# Patient Record
Sex: FEMALE | Race: WHITE | Employment: OTHER | URBAN - METROPOLITAN AREA
[De-identification: names, ages, dates, MRNs, and addresses within clinical notes are randomized per-mention and may not be internally consistent; named-entity substitution may affect disease eponyms.]

---

## 2017-01-03 ENCOUNTER — PATIENT OUTREACH (OUTPATIENT)
Dept: CASE MANAGEMENT | Age: 82
End: 2017-01-03

## 2017-01-26 ENCOUNTER — APPOINTMENT (OUTPATIENT)
Dept: CT IMAGING | Age: 82
End: 2017-01-26
Attending: EMERGENCY MEDICINE
Payer: MEDICARE

## 2017-01-26 ENCOUNTER — HOSPITAL ENCOUNTER (EMERGENCY)
Age: 82
Discharge: HOME | End: 2017-01-27
Attending: EMERGENCY MEDICINE
Payer: MEDICARE

## 2017-01-26 DIAGNOSIS — R10.84 ABDOMINAL PAIN, GENERALIZED: Primary | ICD-10-CM

## 2017-01-26 DIAGNOSIS — R19.7 DIARRHEA, UNSPECIFIED TYPE: ICD-10-CM

## 2017-01-26 DIAGNOSIS — I10 ESSENTIAL HYPERTENSION: Chronic | ICD-10-CM

## 2017-01-26 LAB
ALBUMIN SERPL BCP-MCNC: 3.3 G/DL (ref 3.2–4.6)
ALBUMIN/GLOB SERPL: 0.8 {RATIO} (ref 1.2–3.5)
ALP SERPL-CCNC: 70 U/L (ref 50–136)
ALT SERPL-CCNC: 20 U/L (ref 12–65)
ANION GAP BLD CALC-SCNC: 5 MMOL/L (ref 7–16)
AST SERPL W P-5'-P-CCNC: 17 U/L (ref 15–37)
BASOPHILS # BLD AUTO: 0 K/UL (ref 0–0.2)
BASOPHILS # BLD: 1 % (ref 0–2)
BILIRUB SERPL-MCNC: 0.6 MG/DL (ref 0.2–1.1)
BUN SERPL-MCNC: 17 MG/DL (ref 8–23)
CALCIUM SERPL-MCNC: 9 MG/DL (ref 8.3–10.4)
CHLORIDE SERPL-SCNC: 103 MMOL/L (ref 98–107)
CO2 SERPL-SCNC: 30 MMOL/L (ref 21–32)
CREAT SERPL-MCNC: 0.83 MG/DL (ref 0.6–1)
DIFFERENTIAL METHOD BLD: ABNORMAL
EOSINOPHIL # BLD: 0.3 K/UL (ref 0–0.8)
EOSINOPHIL NFR BLD: 6 % (ref 0.5–7.8)
ERYTHROCYTE [DISTWIDTH] IN BLOOD BY AUTOMATED COUNT: 14 % (ref 11.9–14.6)
GLOBULIN SER CALC-MCNC: 4 G/DL (ref 2.3–3.5)
GLUCOSE SERPL-MCNC: 105 MG/DL (ref 65–100)
HCT VFR BLD AUTO: 37.3 % (ref 35.8–46.3)
HGB BLD-MCNC: 12.5 G/DL (ref 11.7–15.4)
IMM GRANULOCYTES # BLD: 0 K/UL (ref 0–0.5)
IMM GRANULOCYTES NFR BLD AUTO: 0.4 % (ref 0–5)
LIPASE SERPL-CCNC: 154 U/L (ref 73–393)
LYMPHOCYTES # BLD AUTO: 31 % (ref 13–44)
LYMPHOCYTES # BLD: 1.6 K/UL (ref 0.5–4.6)
MCH RBC QN AUTO: 34.6 PG (ref 26.1–32.9)
MCHC RBC AUTO-ENTMCNC: 33.5 G/DL (ref 31.4–35)
MCV RBC AUTO: 103.3 FL (ref 79.6–97.8)
MONOCYTES # BLD: 0.5 K/UL (ref 0.1–1.3)
MONOCYTES NFR BLD AUTO: 9 % (ref 4–12)
NEUTS SEG # BLD: 2.8 K/UL (ref 1.7–8.2)
NEUTS SEG NFR BLD AUTO: 53 % (ref 43–78)
PLATELET # BLD AUTO: 187 K/UL (ref 150–450)
PMV BLD AUTO: 12.3 FL (ref 10.8–14.1)
POTASSIUM SERPL-SCNC: 4.4 MMOL/L (ref 3.5–5.1)
PROT SERPL-MCNC: 7.3 G/DL (ref 6.3–8.2)
RBC # BLD AUTO: 3.61 M/UL (ref 4.05–5.25)
SODIUM SERPL-SCNC: 138 MMOL/L (ref 136–145)
WBC # BLD AUTO: 5.2 K/UL (ref 4.3–11.1)

## 2017-01-26 PROCEDURE — 74011000258 HC RX REV CODE- 258: Performed by: EMERGENCY MEDICINE

## 2017-01-26 PROCEDURE — 96361 HYDRATE IV INFUSION ADD-ON: CPT | Performed by: EMERGENCY MEDICINE

## 2017-01-26 PROCEDURE — 83690 ASSAY OF LIPASE: CPT | Performed by: EMERGENCY MEDICINE

## 2017-01-26 PROCEDURE — 99283 EMERGENCY DEPT VISIT LOW MDM: CPT | Performed by: EMERGENCY MEDICINE

## 2017-01-26 PROCEDURE — 80053 COMPREHEN METABOLIC PANEL: CPT | Performed by: EMERGENCY MEDICINE

## 2017-01-26 PROCEDURE — 96374 THER/PROPH/DIAG INJ IV PUSH: CPT | Performed by: EMERGENCY MEDICINE

## 2017-01-26 PROCEDURE — 96375 TX/PRO/DX INJ NEW DRUG ADDON: CPT | Performed by: EMERGENCY MEDICINE

## 2017-01-26 PROCEDURE — 74177 CT ABD & PELVIS W/CONTRAST: CPT

## 2017-01-26 PROCEDURE — 74011250636 HC RX REV CODE- 250/636: Performed by: EMERGENCY MEDICINE

## 2017-01-26 PROCEDURE — 74011636320 HC RX REV CODE- 636/320: Performed by: EMERGENCY MEDICINE

## 2017-01-26 PROCEDURE — 85025 COMPLETE CBC W/AUTO DIFF WBC: CPT | Performed by: EMERGENCY MEDICINE

## 2017-01-26 RX ORDER — MORPHINE SULFATE 4 MG/ML
2 INJECTION, SOLUTION INTRAMUSCULAR; INTRAVENOUS
Status: COMPLETED | OUTPATIENT
Start: 2017-01-26 | End: 2017-01-26

## 2017-01-26 RX ORDER — ONDANSETRON 2 MG/ML
4 INJECTION INTRAMUSCULAR; INTRAVENOUS
Status: COMPLETED | OUTPATIENT
Start: 2017-01-26 | End: 2017-01-26

## 2017-01-26 RX ORDER — SODIUM CHLORIDE 0.9 % (FLUSH) 0.9 %
10 SYRINGE (ML) INJECTION
Status: COMPLETED | OUTPATIENT
Start: 2017-01-26 | End: 2017-01-26

## 2017-01-26 RX ADMIN — IOPAMIDOL 100 ML: 755 INJECTION, SOLUTION INTRAVENOUS at 23:15

## 2017-01-26 RX ADMIN — ONDANSETRON 4 MG: 2 INJECTION INTRAMUSCULAR; INTRAVENOUS at 21:28

## 2017-01-26 RX ADMIN — SODIUM CHLORIDE 100 ML: 900 INJECTION, SOLUTION INTRAVENOUS at 23:15

## 2017-01-26 RX ADMIN — MORPHINE SULFATE 2 MG: 4 INJECTION, SOLUTION INTRAMUSCULAR; INTRAVENOUS at 21:29

## 2017-01-26 RX ADMIN — DIATRIZOATE MEGLUMINE AND DIATRIZOATE SODIUM 15 ML: 600; 100 SOLUTION ORAL; RECTAL at 21:29

## 2017-01-26 RX ADMIN — Medication 10 ML: at 23:15

## 2017-01-26 RX ADMIN — SODIUM CHLORIDE 500 ML: 900 INJECTION, SOLUTION INTRAVENOUS at 21:29

## 2017-01-26 NOTE — ED TRIAGE NOTES
Pt states that she has had ABD pain since this past wknd, worsening today, has had diarrhea as well.

## 2017-01-27 VITALS
SYSTOLIC BLOOD PRESSURE: 133 MMHG | OXYGEN SATURATION: 94 % | HEIGHT: 67 IN | WEIGHT: 130 LBS | HEART RATE: 67 BPM | DIASTOLIC BLOOD PRESSURE: 67 MMHG | TEMPERATURE: 98 F | BODY MASS INDEX: 20.4 KG/M2 | RESPIRATION RATE: 16 BRPM

## 2017-01-27 RX ORDER — DIPHENOXYLATE HYDROCHLORIDE AND ATROPINE SULFATE 2.5; .025 MG/1; MG/1
2 TABLET ORAL
Qty: 10 TAB | Refills: 0 | Status: SHIPPED | OUTPATIENT
Start: 2017-01-27 | End: 2017-01-30

## 2017-01-27 NOTE — ED PROVIDER NOTES
HPI Comments: 80-year-old female presents with diffuse abdominal cramping, bloating, and diarrhea for the past 4-5 days. Pain has been gradually worsening. She denies any nausea, vomiting, fever. No blood in her stools. Denies urinary complaints. Denies recent antibiotics, surgeries, hospitalizations, ill contacts, or out of country travel. Patient is a 80 y.o. female presenting with abdominal pain. The history is provided by the patient and a relative. Abdominal Pain    Associated symptoms include diarrhea. Pertinent negatives include no fever, no nausea, no vomiting, no dysuria, no headaches, no chest pain and no back pain. 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    Constipation     Fatigue 10/31/2012    GERD (gastroesophageal reflux disease)     Glaucoma 1/3/2013    Hearing loss, sensorineural 1/3/2013    History of subdural hematoma (post traumatic)      Right frontal after fall 6/2016    Hypercholesterolemia     Hyperlipemia 10/31/2012    Hyperlipidemia 10/31/2012    Hypertension 10/31/2012    Macular degeneration 1/3/2013    Mitral valve disorders 10/31/2012    Osteoarthrosis, unspecified whether generalized or localized, lower leg 3/13/2013    Other malaise and fatigue 10/31/2012    Senile osteoporosis 10/31/2012    Skin cancer of scalp 2012    TIA (transient ischemic attack)     Unspecified hypothyroidism 10/31/2012       Past Surgical History:   Procedure Laterality Date    Pr rectal sensation test, balloon      Hx appendectomy      Hx colonoscopy      Hx malignant skin lesion excision       scalp         Family History:   Problem Relation Age of Onset    Hypertension Mother     Heart Disease Father     Breast Cancer Neg Hx        Social History     Social History    Marital status: SINGLE     Spouse name: N/A    Number of children: N/A    Years of education: N/A     Occupational History    Not on file.      Social History Main Topics    Smoking status: Never Smoker    Smokeless tobacco: Never Used    Alcohol use No    Drug use: No    Sexual activity: Not on file     Other Topics Concern    Not on file     Social History Narrative         ALLERGIES: Review of patient's allergies indicates no known allergies. Review of Systems   Constitutional: Positive for fatigue. Negative for chills and fever. HENT: Negative for hearing loss. Eyes: Negative for visual disturbance. Respiratory: Negative for cough and shortness of breath. Cardiovascular: Negative for chest pain and palpitations. Gastrointestinal: Positive for abdominal pain and diarrhea. Negative for blood in stool, nausea and vomiting. Genitourinary: Negative for dysuria. Musculoskeletal: Negative for back pain. Skin: Negative for rash. Neurological: Negative for weakness and headaches. Psychiatric/Behavioral: Negative for confusion. Vitals:    01/26/17 1700   BP: (!) 184/92   Pulse: 74   Resp: 18   Temp: 97.4 °F (36.3 °C)   SpO2: 95%   Weight: 59 kg (130 lb)   Height: 5' 7\" (1.702 m)            Physical Exam   Constitutional: She appears well-developed and well-nourished. HENT:   Head: Normocephalic and atraumatic. Right Ear: External ear normal.   Left Ear: External ear normal.   Nose: Nose normal.   Mouth/Throat: Oropharynx is clear and moist.   Eyes: Conjunctivae are normal. Pupils are equal, round, and reactive to light. Neck: Normal range of motion. Neck supple. Cardiovascular: Regular rhythm and intact distal pulses. Murmur heard. Pulmonary/Chest: Effort normal and breath sounds normal. No respiratory distress. She has no wheezes. Abdominal: Soft. She exhibits distension. Bowel sounds are decreased. There is tenderness. Musculoskeletal: Normal range of motion. She exhibits no edema. Neurological: She is alert. Skin: Skin is warm and dry. Psychiatric: Judgment normal.   Nursing note and vitals reviewed.        MDM  Number of Diagnoses or Management Options  Abdominal pain, generalized:   Essential hypertension:   Diagnosis management comments: Parts of this document were created using dragon voice recognition software. The chart has been reviewed but errors may still be present. 8:48 PM  Labs unremarkable. Will check CT and treat pain with IV morphine, zofran, and fluids. 9:50 PM  Pain improved. Patient does not want any more pain medications at this time. CT pending. Discuss with oncoming physician at shift change  12:49 AM discussed results with patient and friend who is present. No abnormalities seen on the CT scan and she appears comfortable. Repeat abdominal exam is unremarkable.   She is tolerating water without any difficulty       Amount and/or Complexity of Data Reviewed  Clinical lab tests: ordered and reviewed (Results for orders placed or performed during the hospital encounter of 01/26/17  -CBC WITH AUTOMATED DIFF       Result                                            Value                         Ref Range                       WBC                                               5.2                           4.3 - 11.1 K/uL                 RBC                                               3.61 (L)                      4.05 - 5.25 M/uL                HGB                                               12.5                          11.7 - 15.4 g/dL                HCT                                               37.3                          35.8 - 46.3 %                   MCV                                               103.3 (H)                     79.6 - 97.8 FL                  MCH                                               34.6 (H)                      26.1 - 32.9 PG                  MCHC                                              33.5                          31.4 - 35.0 g/dL                RDW                                               14.0                          11.9 - 14.6 % PLATELET                                          187                           150 - 450 K/uL                  MPV                                               12.3                          10.8 - 14.1 FL                  DF                                                AUTOMATED                                                     NEUTROPHILS                                       53                            43 - 78 %                       LYMPHOCYTES                                       31                            13 - 44 %                       MONOCYTES                                         9                             4.0 - 12.0 %                    EOSINOPHILS                                       6                             0.5 - 7.8 %                     BASOPHILS                                         1                             0.0 - 2.0 %                     IMMATURE GRANULOCYTES                             0.4                           0.0 - 5.0 %                     ABS. NEUTROPHILS                                  2.8                           1.7 - 8.2 K/UL                  ABS. LYMPHOCYTES                                  1.6                           0.5 - 4.6 K/UL                  ABS. MONOCYTES                                    0.5                           0.1 - 1.3 K/UL                  ABS. EOSINOPHILS                                  0.3                           0.0 - 0.8 K/UL                  ABS. BASOPHILS                                    0.0                           0.0 - 0.2 K/UL                  ABS. IMM.  GRANS.                                  0.0                           0.0 - 0.5 K/UL             -METABOLIC PANEL, COMPREHENSIVE       Result                                            Value                         Ref Range                       Sodium                                            138                           136 - 145 mmol/L                Potassium 4.4                           3.5 - 5.1 mmol/L                Chloride                                          103                           98 - 107 mmol/L                 CO2                                               30                            21 - 32 mmol/L                  Anion gap                                         5 (L)                         7 - 16 mmol/L                   Glucose                                           105 (H)                       65 - 100 mg/dL                  BUN                                               17                            8 - 23 MG/DL                    Creatinine                                        0.83                          0.6 - 1.0 MG/DL                 GFR est AA                                        >60                           >60 ml/min/1.73m2               GFR est non-AA                                    >60                           >60 ml/min/1.73m2               Calcium                                           9.0                           8.3 - 10.4 MG/DL                Bilirubin, total                                  0.6                           0.2 - 1.1 MG/DL                 ALT                                               20                            12 - 65 U/L                     AST                                               17                            15 - 37 U/L                     Alk. phosphatase                                  70                            50 - 136 U/L                    Protein, total                                    7.3                           6.3 - 8.2 g/dL                  Albumin                                           3.3                           3.2 - 4.6 g/dL                  Globulin                                          4.0 (H)                       2.3 - 3.5 g/dL                  A-G Ratio                                         0.8 (L) 1.2 - 3.5                  -LIPASE       Result                                            Value                         Ref Range                       Lipase                                            154                           73 - 393 U/L               )  Tests in the radiology section of CPT®: ordered and reviewed  Tests in the medicine section of CPT®: ordered and reviewed  Independent visualization of images, tracings, or specimens: yes    Risk of Complications, Morbidity, and/or Mortality  Presenting problems: moderate  Diagnostic procedures: moderate  Management options: moderate    Patient Progress  Patient progress: improved    ED Course       Procedures

## 2017-01-27 NOTE — DISCHARGE INSTRUCTIONS

## 2017-01-30 ENCOUNTER — HOSPITAL ENCOUNTER (EMERGENCY)
Age: 82
Discharge: HOME | End: 2017-01-30
Attending: EMERGENCY MEDICINE
Payer: MEDICARE

## 2017-01-30 ENCOUNTER — PATIENT OUTREACH (OUTPATIENT)
Dept: CASE MANAGEMENT | Age: 82
End: 2017-01-30

## 2017-01-30 ENCOUNTER — APPOINTMENT (OUTPATIENT)
Dept: GENERAL RADIOLOGY | Age: 82
End: 2017-01-30
Attending: EMERGENCY MEDICINE
Payer: MEDICARE

## 2017-01-30 VITALS
HEIGHT: 67 IN | BODY MASS INDEX: 20.4 KG/M2 | RESPIRATION RATE: 16 BRPM | TEMPERATURE: 97.4 F | HEART RATE: 72 BPM | WEIGHT: 130 LBS | SYSTOLIC BLOOD PRESSURE: 148 MMHG | DIASTOLIC BLOOD PRESSURE: 84 MMHG | OXYGEN SATURATION: 97 %

## 2017-01-30 DIAGNOSIS — K59.01 SLOW TRANSIT CONSTIPATION: Primary | ICD-10-CM

## 2017-01-30 LAB
ALBUMIN SERPL BCP-MCNC: 3.6 G/DL (ref 3.2–4.6)
ALBUMIN/GLOB SERPL: 0.9 {RATIO} (ref 1.2–3.5)
ALP SERPL-CCNC: 68 U/L (ref 50–136)
ALT SERPL-CCNC: 22 U/L (ref 12–65)
ANION GAP BLD CALC-SCNC: 7 MMOL/L (ref 7–16)
AST SERPL W P-5'-P-CCNC: 23 U/L (ref 15–37)
BASOPHILS # BLD AUTO: 0 K/UL (ref 0–0.2)
BASOPHILS # BLD: 0 % (ref 0–2)
BILIRUB SERPL-MCNC: 0.6 MG/DL (ref 0.2–1.1)
BUN SERPL-MCNC: 15 MG/DL (ref 8–23)
CALCIUM SERPL-MCNC: 8.7 MG/DL (ref 8.3–10.4)
CHLORIDE SERPL-SCNC: 107 MMOL/L (ref 98–107)
CO2 SERPL-SCNC: 27 MMOL/L (ref 21–32)
CREAT SERPL-MCNC: 0.95 MG/DL (ref 0.6–1)
DIFFERENTIAL METHOD BLD: ABNORMAL
EOSINOPHIL # BLD: 0.3 K/UL (ref 0–0.8)
EOSINOPHIL NFR BLD: 5 % (ref 0.5–7.8)
ERYTHROCYTE [DISTWIDTH] IN BLOOD BY AUTOMATED COUNT: 14 % (ref 11.9–14.6)
GLOBULIN SER CALC-MCNC: 3.8 G/DL (ref 2.3–3.5)
GLUCOSE SERPL-MCNC: 95 MG/DL (ref 65–100)
HCT VFR BLD AUTO: 37.8 % (ref 35.8–46.3)
HGB BLD-MCNC: 12.5 G/DL (ref 11.7–15.4)
IMM GRANULOCYTES # BLD: 0 K/UL (ref 0–0.5)
IMM GRANULOCYTES NFR BLD AUTO: 0.2 % (ref 0–5)
LACTATE BLD-SCNC: 1.4 MMOL/L (ref 0.5–1.9)
LIPASE SERPL-CCNC: 116 U/L (ref 73–393)
LYMPHOCYTES # BLD AUTO: 31 % (ref 13–44)
LYMPHOCYTES # BLD: 1.6 K/UL (ref 0.5–4.6)
MCH RBC QN AUTO: 34.2 PG (ref 26.1–32.9)
MCHC RBC AUTO-ENTMCNC: 33.1 G/DL (ref 31.4–35)
MCV RBC AUTO: 103.6 FL (ref 79.6–97.8)
MONOCYTES # BLD: 0.3 K/UL (ref 0.1–1.3)
MONOCYTES NFR BLD AUTO: 6 % (ref 4–12)
NEUTS SEG # BLD: 3 K/UL (ref 1.7–8.2)
NEUTS SEG NFR BLD AUTO: 58 % (ref 43–78)
PLATELET # BLD AUTO: 189 K/UL (ref 150–450)
PMV BLD AUTO: 11.9 FL (ref 10.8–14.1)
POTASSIUM SERPL-SCNC: 4.6 MMOL/L (ref 3.5–5.1)
PROT SERPL-MCNC: 7.4 G/DL (ref 6.3–8.2)
RBC # BLD AUTO: 3.65 M/UL (ref 4.05–5.25)
SODIUM SERPL-SCNC: 141 MMOL/L (ref 136–145)
TROPONIN I BLD-MCNC: 0.01 NG/ML (ref 0–0.08)
WBC # BLD AUTO: 5.2 K/UL (ref 4.3–11.1)

## 2017-01-30 PROCEDURE — 84484 ASSAY OF TROPONIN QUANT: CPT

## 2017-01-30 PROCEDURE — 74022 RADEX COMPL AQT ABD SERIES: CPT

## 2017-01-30 PROCEDURE — 83690 ASSAY OF LIPASE: CPT | Performed by: EMERGENCY MEDICINE

## 2017-01-30 PROCEDURE — 80053 COMPREHEN METABOLIC PANEL: CPT | Performed by: EMERGENCY MEDICINE

## 2017-01-30 PROCEDURE — 93005 ELECTROCARDIOGRAM TRACING: CPT | Performed by: EMERGENCY MEDICINE

## 2017-01-30 PROCEDURE — 83605 ASSAY OF LACTIC ACID: CPT

## 2017-01-30 PROCEDURE — 85025 COMPLETE CBC W/AUTO DIFF WBC: CPT | Performed by: EMERGENCY MEDICINE

## 2017-01-30 PROCEDURE — 74011250637 HC RX REV CODE- 250/637: Performed by: EMERGENCY MEDICINE

## 2017-01-30 PROCEDURE — 99285 EMERGENCY DEPT VISIT HI MDM: CPT | Performed by: EMERGENCY MEDICINE

## 2017-01-30 RX ORDER — SIMETHICONE 125 MG
125 TABLET,CHEWABLE ORAL
Qty: 60 TAB | Refills: 0 | Status: SHIPPED | OUTPATIENT
Start: 2017-01-30

## 2017-01-30 RX ORDER — SIMETHICONE 80 MG
80 TABLET,CHEWABLE ORAL
Status: COMPLETED | OUTPATIENT
Start: 2017-01-30 | End: 2017-01-30

## 2017-01-30 RX ADMIN — SIMETHICONE CHEW TAB 80 MG 80 MG: 80 TABLET ORAL at 17:28

## 2017-01-30 NOTE — ED TRIAGE NOTES
Reports abdominal pain. States pain is after she eats. Reports no n/v but \"possibly some diarrhea. \"  Seen on Thursday for same.

## 2017-01-30 NOTE — PROGRESS NOTES
Transition of care call placed to Lenny Byrd, friend of Ashlee Chong. Christi reports they are in transport back to the ED due to continued abdominal pain RULINA Kirby states she called [facility] and spoke with  who instructed her to take Ms. Saritha Jaimes back to the ED. CM will follow-up with KARELY call tomorrow.

## 2017-01-30 NOTE — DISCHARGE INSTRUCTIONS
Constipation: Care Instructions  Your Care Instructions  Constipation means that you have a hard time passing stools (bowel movements). People pass stools from 3 times a day to once every 3 days. What is normal for you may be different. Constipation may occur with pain in the rectum and cramping. The pain may get worse when you try to pass stools. Sometimes there are small amounts of bright red blood on toilet paper or the surface of stools. This is because of enlarged veins near the rectum (hemorrhoids). A few changes in your diet and lifestyle may help you avoid ongoing constipation. Your doctor may also prescribe medicine to help loosen your stool. Some medicines can cause constipation. These include pain medicines and antidepressants. Tell your doctor about all the medicines you take. Your doctor may want to make a medicine change to ease your symptoms. Follow-up care is a key part of your treatment and safety. Be sure to make and go to all appointments, and call your doctor if you are having problems. It's also a good idea to know your test results and keep a list of the medicines you take. How can you care for yourself at home? · Drink plenty of fluids, enough so that your urine is light yellow or clear like water. If you have kidney, heart, or liver disease and have to limit fluids, talk with your doctor before you increase the amount of fluids you drink. · Include high-fiber foods in your diet each day. These include fruits, vegetables, beans, and whole grains. · Get at least 30 minutes of exercise on most days of the week. Walking is a good choice. You also may want to do other activities, such as running, swimming, cycling, or playing tennis or team sports. · Take a fiber supplement, such as Citrucel or Metamucil, every day. Read and follow all instructions on the label. · Schedule time each day for a bowel movement. A daily routine may help.  Take your time having your bowel movement. · Support your feet with a small step stool when you sit on the toilet. This helps flex your hips and places your pelvis in a squatting position. · Your doctor may recommend an over-the-counter laxative to relieve your constipation. Examples are Milk of Magnesia and MiraLax. Read and follow all instructions on the label. Do not use laxatives on a long-term basis. When should you call for help? Call your doctor now or seek immediate medical care if:  · You have new or worse belly pain. · You have new or worse nausea or vomiting. · You have blood in your stools. Watch closely for changes in your health, and be sure to contact your doctor if:  · Your constipation is getting worse. · You do not get better as expected. Where can you learn more? Go to http://benedicto-jhonathan.info/. Enter 21 [phone] in the search box to learn more about \"Constipation: Care Instructions. \"  Current as of: May 27, 2016  Content Version: 11.1  © 5257-6329 [employer]. Care instructions adapted under license by [employer] (which disclaims liability or warranty for this information). If you have questions about a medical condition or this instruction, always ask your healthcare professional. [address] any warranty or liability for your use of this information.

## 2017-01-30 NOTE — PROGRESS NOTES
This note will not be viewable in [address] Patient engaged with RN Case Manager, Carmen Samson.     Will forward chart

## 2017-01-31 ENCOUNTER — PATIENT OUTREACH (OUTPATIENT)
Dept: CASE MANAGEMENT | Age: 82
End: 2017-01-31

## 2017-01-31 LAB
ATRIAL RATE: 64 BPM
CALCULATED P AXIS, ECG09: 71 DEGREES
CALCULATED R AXIS, ECG10: 30 DEGREES
CALCULATED T AXIS, ECG11: 53 DEGREES
DIAGNOSIS, 93000: NORMAL
DIASTOLIC BP, ECG02: NORMAL MMHG
P-R INTERVAL, ECG05: 178 MS
Q-T INTERVAL, ECG07: 390 MS
QRS DURATION, ECG06: 86 MS
QTC CALCULATION (BEZET), ECG08: 441 MS
SYSTOLIC BP, ECG01: NORMAL MMHG
VENTRICULAR RATE, ECG03: 77 BPM

## 2017-01-31 NOTE — PROGRESS NOTES
Patient is engaged with CM already. See CM note. Luis M Ricketts LPN/ Care Coordinator  [address], [address] / [place], [address]  [email or web address]

## 2017-01-31 NOTE — ED PROVIDER NOTES
HPI Comments: Presents with complaint of epigastric and left upper quadrant pain. Patient was seen for the same thing approximately 5 days ago and was started on MiraLAX. She had several bowel movements yesterday and 2 today. She felt fine this morning but had pain after eating lunch. Patient is a 80 y.o. female presenting with abdominal pain. The history is provided by the patient and a relative. The history is limited by the condition of the patient (hard of hearing). Abdominal Pain    This is a new problem. The current episode started more than 2 days ago. The problem occurs daily. The problem has not changed since onset. The pain is located in the LUQ and epigastric region. The quality of the pain is aching and cramping. The pain is moderate. Pertinent negatives include no fever, no diarrhea, no nausea, no vomiting, no constipation and no chest pain. Nothing worsens the pain. The pain is relieved by nothing. Past workup includes CT scan.         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    Constipation     Fatigue 10/31/2012    GERD (gastroesophageal reflux disease)     Glaucoma 1/3/2013    Hearing loss, sensorineural 1/3/2013    History of subdural hematoma (post traumatic)      Right frontal after fall 6/2016    Hypercholesterolemia     Hyperlipemia 10/31/2012    Hyperlipidemia 10/31/2012    Hypertension 10/31/2012    Macular degeneration 1/3/2013    Mitral valve disorders 10/31/2012    Osteoarthrosis, unspecified whether generalized or localized, lower leg 3/13/2013    Other malaise and fatigue 10/31/2012    Senile osteoporosis 10/31/2012    Skin cancer of scalp 2012    TIA (transient ischemic attack)     Unspecified hypothyroidism 10/31/2012       Past Surgical History:   Procedure Laterality Date    Pr rectal sensation test, balloon      Hx appendectomy      Hx colonoscopy      Hx malignant skin lesion excision       scalp         Family History:   Problem Relation Age of Onset    Hypertension Mother     Heart Disease Father     Breast Cancer Neg Hx        Social History     Social History    Marital status: SINGLE     Spouse name: N/A    Number of children: N/A    Years of education: N/A     Occupational History    Not on file. Social History Main Topics    Smoking status: Never Smoker    Smokeless tobacco: Never Used    Alcohol use No    Drug use: No    Sexual activity: Not on file     Other Topics Concern    Not on file     Social History Narrative         ALLERGIES: Review of patient's allergies indicates no known allergies. Review of Systems   Constitutional: Negative for chills and fever. Respiratory: Negative for shortness of breath. Cardiovascular: Negative for chest pain and leg swelling. Gastrointestinal: Positive for abdominal pain. Negative for constipation, diarrhea, nausea and vomiting. Skin: Negative for rash and wound. All other systems reviewed and are negative. Vitals:    01/30/17 1729 01/30/17 1730 01/30/17 1839 01/30/17 1840   BP:  136/71 148/84 148/84   Pulse: 68 72     Resp:   16    Temp:   97.4 °F (36.3 °C)    SpO2: 96% 96% 97%    Weight:       Height:                Physical Exam   Constitutional: She is oriented to person, place, and time. She appears well-developed and well-nourished. HENT:   Head: Normocephalic and atraumatic. Neck: Normal range of motion. Neck supple. Cardiovascular: Normal rate and regular rhythm. Pulmonary/Chest: Effort normal and breath sounds normal. No respiratory distress. Abdominal: Soft. Normal appearance and bowel sounds are normal. She exhibits no distension. There is tenderness (mild with deep palpation). There is no rebound and no guarding. Musculoskeletal: Normal range of motion. She exhibits no edema or tenderness. Neurological: She is alert and oriented to person, place, and time. No cranial nerve deficit. Skin: Skin is warm and dry. No rash noted.  No erythema. Psychiatric: She has a normal mood and affect. Her behavior is normal.   Nursing note and vitals reviewed. MDM  Number of Diagnoses or Management Options  Slow transit constipation:   Diagnosis management comments: Well. She had a CT scan on Thursday. I reviewed the CT scan myself and discussed it with Dr. Poly Bautista who was on today to make sure that there is nothing else that was concerning to her. She recommended an acute abdominal series. There are no signs of obstruction. Patient did have a bowel movement today. Her abdomen is not distended she has normal bowel sounds. She felt better after simethicone. Given a prescription for simethicone and told patient and family member needed to take MiraLAX 2-3 times a day until she is having regular good size bowel movements. Return for obstruction sx.          Amount and/or Complexity of Data Reviewed  Clinical lab tests: ordered and reviewed  Review and summarize past medical records: yes  Discuss the patient with other providers: yes  Independent visualization of images, tracings, or specimens: yes    Risk of Complications, Morbidity, and/or Mortality  Presenting problems: high  Diagnostic procedures: moderate  Management options: high    Patient Progress  Patient progress: improved    ED Course       Procedures

## 2017-01-31 NOTE — PROGRESS NOTES
ED Transition of Care Discharge Follow-up Questionnaire   Date/Time of Call:   1/31/17    What was the patient seen in the ED for? Abdominal pain, patient found to have a large amount of gas and constipated stool, no obstruction. Patient sent home with Stephanie Veliz and Simethicone. Does the patient understand his/her diagnosis and/or treatment and what happened during the ED visit? Erick Best states understanding, and states Ms. Kathleen Norton also has understanding. Did the patient receive discharge instructions from the ED? Yes     Review any discharge instructions (see notes in [facility]). Ask patient if they understand these. Do they have any questions? Care plan for constipation   Were home services ordered (nursing, PT, OT, ST, etc.)? No       If so, has the first visit occurred? If not, why? (Assist with coordination of services if necessary.)   N/A       Was any DME ordered? No     If so, has it been received? If not, why?  (Assist with coordination of arranging DME orders if necessary.) N/A         Complete a review of all medications (new, continued and discontinued meds per the D/C instructions and medication tab in [facility]). Lomotil D/C  Stephanie Veliz started  Simethicone started           Were all new prescriptions filled? If not, why?  (Assist with obtainment of medications if necessary.) Yes         Does the patient understand the purpose and dosing instructions for all medications? (If patient has questions, provide explanation and education.) Yes   Does the patient have any problems in performing ADLs? (If patient is unable to perform ADLs  what is the limiting factor(s)? Do they have a support system that can assist? If no support system is present, discuss possible assistance that they may be able to obtain.)   Independent but slow/cautious with movements in home. Lives alone but has neighbors who check in on her daily.            Does the patient have all follow-up appointments scheduled? Has transportation been arranged? [facility] follow-up should be within 7 days of discharge; all others should have PCP follow-up within 7 days of discharge; follow-ups with other specialists as appropriate or ordered.) Appointment with Dr. Sonny Mixon on 2/15. CM advised to call for earlier appointment. Any other questions or concerns expressed by the patient? Nothing further. Christi reports Ms. Sri Landis feeling much better this morning and is taking her medicine as ordered. Schedule next appointment with SYL MACHADO Coordinator as appropriate or refer to RN Case Manager/  as appropriate. None needed at this time. Alistair Charlene has CM contact # if needed.    KARELY Call Completed By: Alan Ann RN

## 2017-02-16 ENCOUNTER — PATIENT OUTREACH (OUTPATIENT)
Dept: CASE MANAGEMENT | Age: 82
End: 2017-02-16

## 2017-02-23 ENCOUNTER — PATIENT OUTREACH (OUTPATIENT)
Dept: CASE MANAGEMENT | Age: 82
End: 2017-02-23

## 2018-09-05 ENCOUNTER — HOME HEALTH ADMISSION (OUTPATIENT)
Dept: HOME HEALTH SERVICES | Facility: HOME HEALTH | Age: 83
End: 2018-09-05
Payer: COMMERCIAL

## 2018-09-07 ENCOUNTER — HOME CARE VISIT (OUTPATIENT)
Dept: SCHEDULING | Facility: HOME HEALTH | Age: 83
End: 2018-09-07
Payer: COMMERCIAL

## 2018-09-07 PROCEDURE — 3331090002 HH PPS REVENUE DEBIT

## 2018-09-07 PROCEDURE — A6216 NON-STERILE GAUZE<=16 SQ IN: HCPCS

## 2018-09-07 PROCEDURE — A4216 STERILE WATER/SALINE, 10 ML: HCPCS

## 2018-09-07 PROCEDURE — 400013 HH SOC

## 2018-09-07 PROCEDURE — A6209 FOAM DRSG <=16 SQ IN W/O BDR: HCPCS

## 2018-09-07 PROCEDURE — A4452 WATERPROOF TAPE: HCPCS

## 2018-09-07 PROCEDURE — 3331090001 HH PPS REVENUE CREDIT

## 2018-09-07 PROCEDURE — A6446 CONFORM BAND S W>=3" <5"/YD: HCPCS

## 2018-09-07 PROCEDURE — A6252 ABSORPT DRG >16 <=48 W/O BDR: HCPCS

## 2018-09-07 PROCEDURE — G0299 HHS/HOSPICE OF RN EA 15 MIN: HCPCS

## 2018-09-08 PROCEDURE — 3331090001 HH PPS REVENUE CREDIT

## 2018-09-08 PROCEDURE — 3331090002 HH PPS REVENUE DEBIT

## 2018-09-09 VITALS
SYSTOLIC BLOOD PRESSURE: 122 MMHG | HEART RATE: 78 BPM | RESPIRATION RATE: 18 BRPM | DIASTOLIC BLOOD PRESSURE: 80 MMHG | TEMPERATURE: 98.1 F | OXYGEN SATURATION: 98 %

## 2018-09-09 PROCEDURE — 3331090001 HH PPS REVENUE CREDIT

## 2018-09-09 PROCEDURE — 3331090002 HH PPS REVENUE DEBIT

## 2018-09-10 ENCOUNTER — HOME CARE VISIT (OUTPATIENT)
Dept: SCHEDULING | Facility: HOME HEALTH | Age: 83
End: 2018-09-10
Payer: COMMERCIAL

## 2018-09-10 VITALS
HEART RATE: 59 BPM | SYSTOLIC BLOOD PRESSURE: 136 MMHG | RESPIRATION RATE: 16 BRPM | DIASTOLIC BLOOD PRESSURE: 86 MMHG | OXYGEN SATURATION: 92 % | TEMPERATURE: 100.1 F

## 2018-09-10 PROCEDURE — 3331090001 HH PPS REVENUE CREDIT

## 2018-09-10 PROCEDURE — 3331090002 HH PPS REVENUE DEBIT

## 2018-09-10 PROCEDURE — G0299 HHS/HOSPICE OF RN EA 15 MIN: HCPCS

## 2018-09-11 PROCEDURE — 3331090002 HH PPS REVENUE DEBIT

## 2018-09-11 PROCEDURE — 3331090001 HH PPS REVENUE CREDIT

## 2018-09-12 PROCEDURE — 3331090002 HH PPS REVENUE DEBIT

## 2018-09-12 PROCEDURE — 3331090001 HH PPS REVENUE CREDIT

## 2018-09-13 ENCOUNTER — HOME CARE VISIT (OUTPATIENT)
Dept: SCHEDULING | Facility: HOME HEALTH | Age: 83
End: 2018-09-13
Payer: COMMERCIAL

## 2018-09-13 VITALS
SYSTOLIC BLOOD PRESSURE: 140 MMHG | OXYGEN SATURATION: 94 % | TEMPERATURE: 98.3 F | HEART RATE: 60 BPM | RESPIRATION RATE: 18 BRPM | DIASTOLIC BLOOD PRESSURE: 87 MMHG

## 2018-09-13 PROCEDURE — G0155 HHCP-SVS OF CSW,EA 15 MIN: HCPCS

## 2018-09-13 PROCEDURE — 3331090002 HH PPS REVENUE DEBIT

## 2018-09-13 PROCEDURE — G0299 HHS/HOSPICE OF RN EA 15 MIN: HCPCS

## 2018-09-13 PROCEDURE — 3331090001 HH PPS REVENUE CREDIT

## 2018-09-14 PROCEDURE — 3331090002 HH PPS REVENUE DEBIT

## 2018-09-14 PROCEDURE — 3331090001 HH PPS REVENUE CREDIT

## 2018-09-15 PROCEDURE — 3331090001 HH PPS REVENUE CREDIT

## 2018-09-15 PROCEDURE — 3331090002 HH PPS REVENUE DEBIT

## 2018-09-16 PROCEDURE — 3331090002 HH PPS REVENUE DEBIT

## 2018-09-16 PROCEDURE — 3331090001 HH PPS REVENUE CREDIT

## 2018-09-17 ENCOUNTER — HOME CARE VISIT (OUTPATIENT)
Dept: SCHEDULING | Facility: HOME HEALTH | Age: 83
End: 2018-09-17
Payer: COMMERCIAL

## 2018-09-17 VITALS
SYSTOLIC BLOOD PRESSURE: 123 MMHG | OXYGEN SATURATION: 97 % | HEART RATE: 56 BPM | RESPIRATION RATE: 16 BRPM | DIASTOLIC BLOOD PRESSURE: 86 MMHG | TEMPERATURE: 98.7 F

## 2018-09-17 PROCEDURE — 3331090001 HH PPS REVENUE CREDIT

## 2018-09-17 PROCEDURE — 3331090002 HH PPS REVENUE DEBIT

## 2018-09-17 PROCEDURE — G0299 HHS/HOSPICE OF RN EA 15 MIN: HCPCS

## 2018-09-18 PROCEDURE — 3331090001 HH PPS REVENUE CREDIT

## 2018-09-18 PROCEDURE — 3331090002 HH PPS REVENUE DEBIT

## 2018-09-19 PROCEDURE — 3331090002 HH PPS REVENUE DEBIT

## 2018-09-19 PROCEDURE — 3331090001 HH PPS REVENUE CREDIT

## 2018-09-20 ENCOUNTER — HOME CARE VISIT (OUTPATIENT)
Dept: SCHEDULING | Facility: HOME HEALTH | Age: 83
End: 2018-09-20
Payer: COMMERCIAL

## 2018-09-20 VITALS
DIASTOLIC BLOOD PRESSURE: 90 MMHG | HEART RATE: 61 BPM | SYSTOLIC BLOOD PRESSURE: 149 MMHG | OXYGEN SATURATION: 95 % | TEMPERATURE: 98.9 F | RESPIRATION RATE: 16 BRPM

## 2018-09-20 PROCEDURE — 3331090002 HH PPS REVENUE DEBIT

## 2018-09-20 PROCEDURE — 3331090001 HH PPS REVENUE CREDIT

## 2018-09-20 PROCEDURE — G0299 HHS/HOSPICE OF RN EA 15 MIN: HCPCS

## 2018-09-21 PROCEDURE — 3331090001 HH PPS REVENUE CREDIT

## 2018-09-21 PROCEDURE — 3331090002 HH PPS REVENUE DEBIT

## 2018-09-22 PROCEDURE — 3331090001 HH PPS REVENUE CREDIT

## 2018-09-22 PROCEDURE — 3331090002 HH PPS REVENUE DEBIT

## 2018-09-23 PROCEDURE — 3331090001 HH PPS REVENUE CREDIT

## 2018-09-23 PROCEDURE — 3331090002 HH PPS REVENUE DEBIT

## 2018-09-24 ENCOUNTER — HOME CARE VISIT (OUTPATIENT)
Dept: SCHEDULING | Facility: HOME HEALTH | Age: 83
End: 2018-09-24
Payer: COMMERCIAL

## 2018-09-24 VITALS
TEMPERATURE: 98.1 F | RESPIRATION RATE: 18 BRPM | DIASTOLIC BLOOD PRESSURE: 88 MMHG | SYSTOLIC BLOOD PRESSURE: 147 MMHG | HEART RATE: 68 BPM | OXYGEN SATURATION: 96 %

## 2018-09-24 PROCEDURE — 3331090002 HH PPS REVENUE DEBIT

## 2018-09-24 PROCEDURE — G0299 HHS/HOSPICE OF RN EA 15 MIN: HCPCS

## 2018-09-24 PROCEDURE — 3331090001 HH PPS REVENUE CREDIT

## 2018-09-25 PROCEDURE — 3331090001 HH PPS REVENUE CREDIT

## 2018-09-25 PROCEDURE — 3331090002 HH PPS REVENUE DEBIT

## 2018-09-26 ENCOUNTER — HOME CARE VISIT (OUTPATIENT)
Dept: SCHEDULING | Facility: HOME HEALTH | Age: 83
End: 2018-09-26
Payer: COMMERCIAL

## 2018-09-26 VITALS
DIASTOLIC BLOOD PRESSURE: 90 MMHG | HEART RATE: 78 BPM | OXYGEN SATURATION: 97 % | SYSTOLIC BLOOD PRESSURE: 162 MMHG | TEMPERATURE: 98.7 F | RESPIRATION RATE: 18 BRPM

## 2018-09-26 PROCEDURE — 3331090001 HH PPS REVENUE CREDIT

## 2018-09-26 PROCEDURE — 3331090002 HH PPS REVENUE DEBIT

## 2018-09-26 PROCEDURE — A6248 HYDROGEL DRSG GEL FILLER: HCPCS

## 2018-09-26 PROCEDURE — A6446 CONFORM BAND S W>=3" <5"/YD: HCPCS

## 2018-09-26 PROCEDURE — G0299 HHS/HOSPICE OF RN EA 15 MIN: HCPCS

## 2018-09-27 ENCOUNTER — HOME CARE VISIT (OUTPATIENT)
Dept: SCHEDULING | Facility: HOME HEALTH | Age: 83
End: 2018-09-27
Payer: COMMERCIAL

## 2018-09-27 PROCEDURE — 3331090002 HH PPS REVENUE DEBIT

## 2018-09-27 PROCEDURE — 3331090001 HH PPS REVENUE CREDIT

## 2018-09-27 PROCEDURE — G0155 HHCP-SVS OF CSW,EA 15 MIN: HCPCS

## 2018-09-28 ENCOUNTER — HOME CARE VISIT (OUTPATIENT)
Dept: SCHEDULING | Facility: HOME HEALTH | Age: 83
End: 2018-09-28
Payer: COMMERCIAL

## 2018-09-28 VITALS
DIASTOLIC BLOOD PRESSURE: 79 MMHG | SYSTOLIC BLOOD PRESSURE: 135 MMHG | HEART RATE: 59 BPM | RESPIRATION RATE: 16 BRPM | TEMPERATURE: 98.9 F | OXYGEN SATURATION: 95 %

## 2018-09-28 PROCEDURE — 3331090001 HH PPS REVENUE CREDIT

## 2018-09-28 PROCEDURE — 3331090002 HH PPS REVENUE DEBIT

## 2018-09-28 PROCEDURE — G0299 HHS/HOSPICE OF RN EA 15 MIN: HCPCS

## 2018-09-29 PROCEDURE — 3331090002 HH PPS REVENUE DEBIT

## 2018-09-29 PROCEDURE — 3331090001 HH PPS REVENUE CREDIT

## 2018-09-30 PROCEDURE — 3331090002 HH PPS REVENUE DEBIT

## 2018-09-30 PROCEDURE — 3331090001 HH PPS REVENUE CREDIT

## 2018-10-01 ENCOUNTER — HOME CARE VISIT (OUTPATIENT)
Dept: SCHEDULING | Facility: HOME HEALTH | Age: 83
End: 2018-10-01
Payer: COMMERCIAL

## 2018-10-01 VITALS
SYSTOLIC BLOOD PRESSURE: 139 MMHG | HEART RATE: 58 BPM | TEMPERATURE: 98.6 F | OXYGEN SATURATION: 95 % | DIASTOLIC BLOOD PRESSURE: 83 MMHG | RESPIRATION RATE: 16 BRPM

## 2018-10-01 PROCEDURE — 3331090002 HH PPS REVENUE DEBIT

## 2018-10-01 PROCEDURE — 3331090001 HH PPS REVENUE CREDIT

## 2018-10-01 PROCEDURE — G0299 HHS/HOSPICE OF RN EA 15 MIN: HCPCS

## 2018-10-02 PROCEDURE — 3331090001 HH PPS REVENUE CREDIT

## 2018-10-02 PROCEDURE — 3331090002 HH PPS REVENUE DEBIT

## 2018-10-03 ENCOUNTER — HOME CARE VISIT (OUTPATIENT)
Dept: SCHEDULING | Facility: HOME HEALTH | Age: 83
End: 2018-10-03
Payer: COMMERCIAL

## 2018-10-03 VITALS
TEMPERATURE: 98.3 F | SYSTOLIC BLOOD PRESSURE: 148 MMHG | HEART RATE: 56 BPM | RESPIRATION RATE: 16 BRPM | DIASTOLIC BLOOD PRESSURE: 84 MMHG | OXYGEN SATURATION: 95 %

## 2018-10-03 PROCEDURE — A6266 IMPREG GAUZE NO H20/SAL/YARD: HCPCS

## 2018-10-03 PROCEDURE — 3331090001 HH PPS REVENUE CREDIT

## 2018-10-03 PROCEDURE — G0299 HHS/HOSPICE OF RN EA 15 MIN: HCPCS

## 2018-10-03 PROCEDURE — 3331090002 HH PPS REVENUE DEBIT

## 2018-10-04 PROCEDURE — 3331090002 HH PPS REVENUE DEBIT

## 2018-10-04 PROCEDURE — 3331090001 HH PPS REVENUE CREDIT

## 2018-10-05 ENCOUNTER — HOME CARE VISIT (OUTPATIENT)
Dept: SCHEDULING | Facility: HOME HEALTH | Age: 83
End: 2018-10-05
Payer: COMMERCIAL

## 2018-10-05 VITALS
OXYGEN SATURATION: 97 % | HEART RATE: 56 BPM | TEMPERATURE: 98.9 F | RESPIRATION RATE: 18 BRPM | SYSTOLIC BLOOD PRESSURE: 127 MMHG | DIASTOLIC BLOOD PRESSURE: 85 MMHG

## 2018-10-05 PROCEDURE — G0299 HHS/HOSPICE OF RN EA 15 MIN: HCPCS

## 2018-10-05 PROCEDURE — 3331090001 HH PPS REVENUE CREDIT

## 2018-10-05 PROCEDURE — 3331090002 HH PPS REVENUE DEBIT

## 2018-10-06 PROCEDURE — 3331090001 HH PPS REVENUE CREDIT

## 2018-10-06 PROCEDURE — 3331090002 HH PPS REVENUE DEBIT

## 2018-10-07 PROCEDURE — 3331090001 HH PPS REVENUE CREDIT

## 2018-10-07 PROCEDURE — 3331090002 HH PPS REVENUE DEBIT

## 2018-10-08 ENCOUNTER — HOME CARE VISIT (OUTPATIENT)
Dept: SCHEDULING | Facility: HOME HEALTH | Age: 83
End: 2018-10-08
Payer: COMMERCIAL

## 2018-10-08 VITALS
TEMPERATURE: 98.4 F | DIASTOLIC BLOOD PRESSURE: 83 MMHG | SYSTOLIC BLOOD PRESSURE: 137 MMHG | OXYGEN SATURATION: 95 % | HEART RATE: 78 BPM | RESPIRATION RATE: 16 BRPM

## 2018-10-08 PROCEDURE — 3331090002 HH PPS REVENUE DEBIT

## 2018-10-08 PROCEDURE — G0299 HHS/HOSPICE OF RN EA 15 MIN: HCPCS

## 2018-10-08 PROCEDURE — 3331090001 HH PPS REVENUE CREDIT

## 2018-10-09 PROCEDURE — 3331090002 HH PPS REVENUE DEBIT

## 2018-10-09 PROCEDURE — 3331090001 HH PPS REVENUE CREDIT

## 2018-10-10 ENCOUNTER — HOME CARE VISIT (OUTPATIENT)
Dept: SCHEDULING | Facility: HOME HEALTH | Age: 83
End: 2018-10-10
Payer: COMMERCIAL

## 2018-10-10 VITALS
OXYGEN SATURATION: 96 % | HEART RATE: 67 BPM | TEMPERATURE: 98.1 F | RESPIRATION RATE: 16 BRPM | SYSTOLIC BLOOD PRESSURE: 128 MMHG | DIASTOLIC BLOOD PRESSURE: 84 MMHG

## 2018-10-10 PROCEDURE — 3331090001 HH PPS REVENUE CREDIT

## 2018-10-10 PROCEDURE — G0299 HHS/HOSPICE OF RN EA 15 MIN: HCPCS

## 2018-10-10 PROCEDURE — 3331090002 HH PPS REVENUE DEBIT

## 2018-10-11 PROCEDURE — 3331090001 HH PPS REVENUE CREDIT

## 2018-10-11 PROCEDURE — 3331090002 HH PPS REVENUE DEBIT

## 2018-10-12 ENCOUNTER — HOME CARE VISIT (OUTPATIENT)
Dept: SCHEDULING | Facility: HOME HEALTH | Age: 83
End: 2018-10-12
Payer: COMMERCIAL

## 2018-10-12 VITALS
HEART RATE: 56 BPM | RESPIRATION RATE: 16 BRPM | DIASTOLIC BLOOD PRESSURE: 80 MMHG | SYSTOLIC BLOOD PRESSURE: 147 MMHG | TEMPERATURE: 97.9 F | OXYGEN SATURATION: 97 %

## 2018-10-12 PROCEDURE — 3331090002 HH PPS REVENUE DEBIT

## 2018-10-12 PROCEDURE — G0299 HHS/HOSPICE OF RN EA 15 MIN: HCPCS

## 2018-10-12 PROCEDURE — 3331090001 HH PPS REVENUE CREDIT

## 2018-10-13 PROCEDURE — 3331090001 HH PPS REVENUE CREDIT

## 2018-10-13 PROCEDURE — 3331090002 HH PPS REVENUE DEBIT

## 2018-10-14 PROCEDURE — 3331090001 HH PPS REVENUE CREDIT

## 2018-10-14 PROCEDURE — 3331090002 HH PPS REVENUE DEBIT

## 2018-10-15 ENCOUNTER — HOME CARE VISIT (OUTPATIENT)
Dept: SCHEDULING | Facility: HOME HEALTH | Age: 83
End: 2018-10-15
Payer: COMMERCIAL

## 2018-10-15 VITALS
OXYGEN SATURATION: 98 % | TEMPERATURE: 97.8 F | SYSTOLIC BLOOD PRESSURE: 148 MMHG | RESPIRATION RATE: 16 BRPM | DIASTOLIC BLOOD PRESSURE: 88 MMHG | HEART RATE: 60 BPM

## 2018-10-15 PROCEDURE — G0299 HHS/HOSPICE OF RN EA 15 MIN: HCPCS

## 2018-10-15 PROCEDURE — 3331090001 HH PPS REVENUE CREDIT

## 2018-10-15 PROCEDURE — 3331090002 HH PPS REVENUE DEBIT

## 2018-10-16 PROCEDURE — 3331090002 HH PPS REVENUE DEBIT

## 2018-10-16 PROCEDURE — 3331090001 HH PPS REVENUE CREDIT

## 2018-10-17 ENCOUNTER — HOME CARE VISIT (OUTPATIENT)
Dept: SCHEDULING | Facility: HOME HEALTH | Age: 83
End: 2018-10-17
Payer: COMMERCIAL

## 2018-10-17 PROCEDURE — G0299 HHS/HOSPICE OF RN EA 15 MIN: HCPCS

## 2018-10-17 PROCEDURE — 3331090002 HH PPS REVENUE DEBIT

## 2018-10-17 PROCEDURE — 3331090001 HH PPS REVENUE CREDIT

## 2018-10-18 VITALS
HEART RATE: 58 BPM | RESPIRATION RATE: 16 BRPM | SYSTOLIC BLOOD PRESSURE: 147 MMHG | OXYGEN SATURATION: 94 % | TEMPERATURE: 98.7 F | DIASTOLIC BLOOD PRESSURE: 83 MMHG

## 2018-10-18 PROCEDURE — 3331090001 HH PPS REVENUE CREDIT

## 2018-10-18 PROCEDURE — 3331090002 HH PPS REVENUE DEBIT

## 2018-10-19 ENCOUNTER — HOME CARE VISIT (OUTPATIENT)
Dept: SCHEDULING | Facility: HOME HEALTH | Age: 83
End: 2018-10-19
Payer: COMMERCIAL

## 2018-10-19 VITALS
TEMPERATURE: 97.4 F | RESPIRATION RATE: 16 BRPM | DIASTOLIC BLOOD PRESSURE: 87 MMHG | SYSTOLIC BLOOD PRESSURE: 140 MMHG | HEART RATE: 59 BPM | OXYGEN SATURATION: 97 %

## 2018-10-19 PROCEDURE — G0299 HHS/HOSPICE OF RN EA 15 MIN: HCPCS

## 2018-10-19 PROCEDURE — 3331090002 HH PPS REVENUE DEBIT

## 2018-10-19 PROCEDURE — 3331090001 HH PPS REVENUE CREDIT

## 2018-10-20 PROCEDURE — 3331090002 HH PPS REVENUE DEBIT

## 2018-10-20 PROCEDURE — 3331090001 HH PPS REVENUE CREDIT

## 2018-10-21 PROCEDURE — 3331090002 HH PPS REVENUE DEBIT

## 2018-10-21 PROCEDURE — 3331090001 HH PPS REVENUE CREDIT

## 2018-10-22 ENCOUNTER — HOME CARE VISIT (OUTPATIENT)
Dept: SCHEDULING | Facility: HOME HEALTH | Age: 83
End: 2018-10-22
Payer: COMMERCIAL

## 2018-10-22 VITALS
SYSTOLIC BLOOD PRESSURE: 125 MMHG | DIASTOLIC BLOOD PRESSURE: 73 MMHG | OXYGEN SATURATION: 95 % | RESPIRATION RATE: 16 BRPM | TEMPERATURE: 98.7 F | HEART RATE: 70 BPM

## 2018-10-22 PROCEDURE — 3331090001 HH PPS REVENUE CREDIT

## 2018-10-22 PROCEDURE — G0299 HHS/HOSPICE OF RN EA 15 MIN: HCPCS

## 2018-10-22 PROCEDURE — 3331090002 HH PPS REVENUE DEBIT

## 2018-10-23 PROCEDURE — 3331090002 HH PPS REVENUE DEBIT

## 2018-10-23 PROCEDURE — 3331090001 HH PPS REVENUE CREDIT

## 2018-10-24 ENCOUNTER — HOME CARE VISIT (OUTPATIENT)
Dept: SCHEDULING | Facility: HOME HEALTH | Age: 83
End: 2018-10-24
Payer: COMMERCIAL

## 2018-10-24 VITALS
DIASTOLIC BLOOD PRESSURE: 81 MMHG | SYSTOLIC BLOOD PRESSURE: 133 MMHG | TEMPERATURE: 98 F | RESPIRATION RATE: 16 BRPM | HEART RATE: 65 BPM | OXYGEN SATURATION: 98 %

## 2018-10-24 PROCEDURE — 3331090001 HH PPS REVENUE CREDIT

## 2018-10-24 PROCEDURE — 3331090002 HH PPS REVENUE DEBIT

## 2018-10-24 PROCEDURE — G0299 HHS/HOSPICE OF RN EA 15 MIN: HCPCS

## 2018-10-25 PROCEDURE — 3331090002 HH PPS REVENUE DEBIT

## 2018-10-25 PROCEDURE — 3331090001 HH PPS REVENUE CREDIT

## 2018-10-26 ENCOUNTER — HOME CARE VISIT (OUTPATIENT)
Dept: SCHEDULING | Facility: HOME HEALTH | Age: 83
End: 2018-10-26
Payer: COMMERCIAL

## 2018-10-26 PROCEDURE — 3331090001 HH PPS REVENUE CREDIT

## 2018-10-26 PROCEDURE — G0299 HHS/HOSPICE OF RN EA 15 MIN: HCPCS

## 2018-10-26 PROCEDURE — 3331090002 HH PPS REVENUE DEBIT

## 2018-10-27 VITALS
HEART RATE: 70 BPM | DIASTOLIC BLOOD PRESSURE: 89 MMHG | SYSTOLIC BLOOD PRESSURE: 149 MMHG | TEMPERATURE: 98 F | RESPIRATION RATE: 16 BRPM | OXYGEN SATURATION: 95 %

## 2018-10-27 PROCEDURE — 3331090002 HH PPS REVENUE DEBIT

## 2018-10-27 PROCEDURE — 3331090001 HH PPS REVENUE CREDIT

## 2018-10-28 PROCEDURE — 3331090002 HH PPS REVENUE DEBIT

## 2018-10-28 PROCEDURE — 3331090001 HH PPS REVENUE CREDIT

## 2018-10-29 ENCOUNTER — HOME CARE VISIT (OUTPATIENT)
Dept: SCHEDULING | Facility: HOME HEALTH | Age: 83
End: 2018-10-29
Payer: COMMERCIAL

## 2018-10-29 VITALS
HEART RATE: 58 BPM | OXYGEN SATURATION: 94 % | SYSTOLIC BLOOD PRESSURE: 144 MMHG | TEMPERATURE: 98.4 F | DIASTOLIC BLOOD PRESSURE: 68 MMHG | RESPIRATION RATE: 18 BRPM

## 2018-10-29 PROCEDURE — G0299 HHS/HOSPICE OF RN EA 15 MIN: HCPCS

## 2018-10-29 PROCEDURE — 3331090001 HH PPS REVENUE CREDIT

## 2018-10-29 PROCEDURE — 3331090002 HH PPS REVENUE DEBIT

## 2018-10-30 PROCEDURE — 3331090002 HH PPS REVENUE DEBIT

## 2018-10-30 PROCEDURE — 3331090001 HH PPS REVENUE CREDIT

## 2018-10-31 ENCOUNTER — HOME CARE VISIT (OUTPATIENT)
Dept: SCHEDULING | Facility: HOME HEALTH | Age: 83
End: 2018-10-31
Payer: COMMERCIAL

## 2018-10-31 VITALS
HEART RATE: 64 BPM | SYSTOLIC BLOOD PRESSURE: 147 MMHG | TEMPERATURE: 98 F | OXYGEN SATURATION: 95 % | RESPIRATION RATE: 16 BRPM | DIASTOLIC BLOOD PRESSURE: 80 MMHG

## 2018-10-31 PROCEDURE — G0299 HHS/HOSPICE OF RN EA 15 MIN: HCPCS

## 2018-10-31 PROCEDURE — 3331090002 HH PPS REVENUE DEBIT

## 2018-10-31 PROCEDURE — 3331090001 HH PPS REVENUE CREDIT

## 2018-11-01 PROCEDURE — 3331090001 HH PPS REVENUE CREDIT

## 2018-11-01 PROCEDURE — 3331090002 HH PPS REVENUE DEBIT

## 2018-11-02 ENCOUNTER — HOME CARE VISIT (OUTPATIENT)
Dept: SCHEDULING | Facility: HOME HEALTH | Age: 83
End: 2018-11-02
Payer: COMMERCIAL

## 2018-11-02 VITALS
DIASTOLIC BLOOD PRESSURE: 82 MMHG | RESPIRATION RATE: 17 BRPM | SYSTOLIC BLOOD PRESSURE: 149 MMHG | TEMPERATURE: 98 F | OXYGEN SATURATION: 93 % | HEART RATE: 58 BPM

## 2018-11-02 PROCEDURE — 3331090002 HH PPS REVENUE DEBIT

## 2018-11-02 PROCEDURE — 3331090001 HH PPS REVENUE CREDIT

## 2018-11-02 PROCEDURE — G0299 HHS/HOSPICE OF RN EA 15 MIN: HCPCS

## 2018-11-03 PROCEDURE — 3331090002 HH PPS REVENUE DEBIT

## 2018-11-03 PROCEDURE — 3331090001 HH PPS REVENUE CREDIT

## 2018-11-04 PROCEDURE — 3331090002 HH PPS REVENUE DEBIT

## 2018-11-04 PROCEDURE — 3331090001 HH PPS REVENUE CREDIT

## 2018-11-05 ENCOUNTER — HOME CARE VISIT (OUTPATIENT)
Dept: SCHEDULING | Facility: HOME HEALTH | Age: 83
End: 2018-11-05
Payer: COMMERCIAL

## 2018-11-05 VITALS
OXYGEN SATURATION: 96 % | TEMPERATURE: 98.1 F | SYSTOLIC BLOOD PRESSURE: 143 MMHG | HEART RATE: 60 BPM | RESPIRATION RATE: 16 BRPM | DIASTOLIC BLOOD PRESSURE: 81 MMHG

## 2018-11-05 PROCEDURE — 3331090002 HH PPS REVENUE DEBIT

## 2018-11-05 PROCEDURE — 3331090001 HH PPS REVENUE CREDIT

## 2018-11-05 PROCEDURE — G0299 HHS/HOSPICE OF RN EA 15 MIN: HCPCS

## 2018-11-06 PROCEDURE — 3331090002 HH PPS REVENUE DEBIT

## 2018-11-06 PROCEDURE — 3331090001 HH PPS REVENUE CREDIT

## 2018-11-07 ENCOUNTER — HOME CARE VISIT (OUTPATIENT)
Dept: SCHEDULING | Facility: HOME HEALTH | Age: 83
End: 2018-11-07
Payer: COMMERCIAL

## 2018-11-07 VITALS
HEART RATE: 57 BPM | RESPIRATION RATE: 18 BRPM | OXYGEN SATURATION: 97 % | TEMPERATURE: 98.3 F | SYSTOLIC BLOOD PRESSURE: 149 MMHG | DIASTOLIC BLOOD PRESSURE: 80 MMHG

## 2018-11-07 PROCEDURE — A6209 FOAM DRSG <=16 SQ IN W/O BDR: HCPCS

## 2018-11-07 PROCEDURE — 400014 HH F/U

## 2018-11-07 PROCEDURE — G0299 HHS/HOSPICE OF RN EA 15 MIN: HCPCS

## 2018-11-07 PROCEDURE — 3331090002 HH PPS REVENUE DEBIT

## 2018-11-07 PROCEDURE — A4216 STERILE WATER/SALINE, 10 ML: HCPCS

## 2018-11-07 PROCEDURE — 3331090001 HH PPS REVENUE CREDIT

## 2018-11-07 PROCEDURE — A6446 CONFORM BAND S W>=3" <5"/YD: HCPCS

## 2018-11-08 PROCEDURE — 3331090001 HH PPS REVENUE CREDIT

## 2018-11-08 PROCEDURE — 3331090002 HH PPS REVENUE DEBIT

## 2018-11-09 ENCOUNTER — HOME CARE VISIT (OUTPATIENT)
Dept: SCHEDULING | Facility: HOME HEALTH | Age: 83
End: 2018-11-09
Payer: COMMERCIAL

## 2018-11-09 VITALS
HEART RATE: 54 BPM | DIASTOLIC BLOOD PRESSURE: 82 MMHG | SYSTOLIC BLOOD PRESSURE: 138 MMHG | RESPIRATION RATE: 18 BRPM | OXYGEN SATURATION: 96 % | TEMPERATURE: 99 F

## 2018-11-09 PROCEDURE — G0299 HHS/HOSPICE OF RN EA 15 MIN: HCPCS

## 2018-11-09 PROCEDURE — 3331090001 HH PPS REVENUE CREDIT

## 2018-11-09 PROCEDURE — 3331090002 HH PPS REVENUE DEBIT

## 2018-11-10 PROCEDURE — 3331090002 HH PPS REVENUE DEBIT

## 2018-11-10 PROCEDURE — 3331090001 HH PPS REVENUE CREDIT

## 2018-11-11 PROCEDURE — 3331090001 HH PPS REVENUE CREDIT

## 2018-11-11 PROCEDURE — 3331090002 HH PPS REVENUE DEBIT

## 2018-11-12 ENCOUNTER — HOME CARE VISIT (OUTPATIENT)
Dept: SCHEDULING | Facility: HOME HEALTH | Age: 83
End: 2018-11-12
Payer: COMMERCIAL

## 2018-11-12 VITALS
SYSTOLIC BLOOD PRESSURE: 141 MMHG | OXYGEN SATURATION: 96 % | HEART RATE: 66 BPM | DIASTOLIC BLOOD PRESSURE: 86 MMHG | TEMPERATURE: 99.6 F | RESPIRATION RATE: 16 BRPM

## 2018-11-12 PROCEDURE — G0299 HHS/HOSPICE OF RN EA 15 MIN: HCPCS

## 2018-11-12 PROCEDURE — 3331090002 HH PPS REVENUE DEBIT

## 2018-11-12 PROCEDURE — 3331090001 HH PPS REVENUE CREDIT

## 2018-11-13 PROCEDURE — 3331090002 HH PPS REVENUE DEBIT

## 2018-11-13 PROCEDURE — 3331090001 HH PPS REVENUE CREDIT

## 2018-11-14 ENCOUNTER — HOME CARE VISIT (OUTPATIENT)
Dept: SCHEDULING | Facility: HOME HEALTH | Age: 83
End: 2018-11-14
Payer: COMMERCIAL

## 2018-11-14 VITALS
SYSTOLIC BLOOD PRESSURE: 144 MMHG | RESPIRATION RATE: 16 BRPM | OXYGEN SATURATION: 99 % | HEART RATE: 57 BPM | TEMPERATURE: 98.1 F | DIASTOLIC BLOOD PRESSURE: 90 MMHG

## 2018-11-14 PROCEDURE — 3331090002 HH PPS REVENUE DEBIT

## 2018-11-14 PROCEDURE — 3331090001 HH PPS REVENUE CREDIT

## 2018-11-14 PROCEDURE — G0299 HHS/HOSPICE OF RN EA 15 MIN: HCPCS

## 2018-11-15 PROCEDURE — 3331090002 HH PPS REVENUE DEBIT

## 2018-11-15 PROCEDURE — 3331090001 HH PPS REVENUE CREDIT

## 2018-11-16 ENCOUNTER — HOME CARE VISIT (OUTPATIENT)
Dept: SCHEDULING | Facility: HOME HEALTH | Age: 83
End: 2018-11-16
Payer: COMMERCIAL

## 2018-11-16 VITALS
OXYGEN SATURATION: 96 % | HEART RATE: 62 BPM | RESPIRATION RATE: 16 BRPM | DIASTOLIC BLOOD PRESSURE: 78 MMHG | SYSTOLIC BLOOD PRESSURE: 122 MMHG | TEMPERATURE: 98.3 F

## 2018-11-16 PROCEDURE — 3331090002 HH PPS REVENUE DEBIT

## 2018-11-16 PROCEDURE — G0299 HHS/HOSPICE OF RN EA 15 MIN: HCPCS

## 2018-11-16 PROCEDURE — 3331090001 HH PPS REVENUE CREDIT

## 2018-11-17 PROCEDURE — 3331090001 HH PPS REVENUE CREDIT

## 2018-11-17 PROCEDURE — 3331090002 HH PPS REVENUE DEBIT

## 2018-11-18 PROCEDURE — 3331090001 HH PPS REVENUE CREDIT

## 2018-11-18 PROCEDURE — 3331090002 HH PPS REVENUE DEBIT

## 2018-11-19 ENCOUNTER — HOME CARE VISIT (OUTPATIENT)
Dept: SCHEDULING | Facility: HOME HEALTH | Age: 83
End: 2018-11-19
Payer: COMMERCIAL

## 2018-11-19 VITALS
DIASTOLIC BLOOD PRESSURE: 89 MMHG | SYSTOLIC BLOOD PRESSURE: 143 MMHG | OXYGEN SATURATION: 98 % | HEART RATE: 58 BPM | RESPIRATION RATE: 15 BRPM | TEMPERATURE: 97.9 F

## 2018-11-19 PROCEDURE — G0299 HHS/HOSPICE OF RN EA 15 MIN: HCPCS

## 2018-11-19 PROCEDURE — 3331090001 HH PPS REVENUE CREDIT

## 2018-11-19 PROCEDURE — 3331090002 HH PPS REVENUE DEBIT

## 2018-11-20 PROCEDURE — 3331090002 HH PPS REVENUE DEBIT

## 2018-11-20 PROCEDURE — 3331090001 HH PPS REVENUE CREDIT

## 2018-11-21 ENCOUNTER — HOME CARE VISIT (OUTPATIENT)
Dept: SCHEDULING | Facility: HOME HEALTH | Age: 83
End: 2018-11-21
Payer: COMMERCIAL

## 2018-11-21 VITALS
OXYGEN SATURATION: 95 % | DIASTOLIC BLOOD PRESSURE: 89 MMHG | TEMPERATURE: 98.1 F | SYSTOLIC BLOOD PRESSURE: 147 MMHG | RESPIRATION RATE: 16 BRPM | HEART RATE: 64 BPM

## 2018-11-21 PROCEDURE — 3331090001 HH PPS REVENUE CREDIT

## 2018-11-21 PROCEDURE — 3331090002 HH PPS REVENUE DEBIT

## 2018-11-21 PROCEDURE — G0299 HHS/HOSPICE OF RN EA 15 MIN: HCPCS

## 2018-11-22 PROCEDURE — 3331090001 HH PPS REVENUE CREDIT

## 2018-11-22 PROCEDURE — 3331090002 HH PPS REVENUE DEBIT

## 2018-11-23 ENCOUNTER — HOME CARE VISIT (OUTPATIENT)
Dept: SCHEDULING | Facility: HOME HEALTH | Age: 83
End: 2018-11-23
Payer: COMMERCIAL

## 2018-11-23 PROCEDURE — 3331090001 HH PPS REVENUE CREDIT

## 2018-11-23 PROCEDURE — G0299 HHS/HOSPICE OF RN EA 15 MIN: HCPCS

## 2018-11-23 PROCEDURE — 3331090002 HH PPS REVENUE DEBIT

## 2018-11-24 PROCEDURE — 3331090002 HH PPS REVENUE DEBIT

## 2018-11-24 PROCEDURE — 3331090001 HH PPS REVENUE CREDIT

## 2018-11-25 VITALS
TEMPERATURE: 97.9 F | SYSTOLIC BLOOD PRESSURE: 138 MMHG | RESPIRATION RATE: 16 BRPM | HEART RATE: 59 BPM | DIASTOLIC BLOOD PRESSURE: 88 MMHG | OXYGEN SATURATION: 94 %

## 2018-11-25 PROCEDURE — 3331090002 HH PPS REVENUE DEBIT

## 2018-11-25 PROCEDURE — 3331090001 HH PPS REVENUE CREDIT

## 2018-11-26 ENCOUNTER — HOME CARE VISIT (OUTPATIENT)
Dept: SCHEDULING | Facility: HOME HEALTH | Age: 83
End: 2018-11-26
Payer: COMMERCIAL

## 2018-11-26 VITALS
RESPIRATION RATE: 17 BRPM | DIASTOLIC BLOOD PRESSURE: 88 MMHG | OXYGEN SATURATION: 95 % | HEART RATE: 64 BPM | SYSTOLIC BLOOD PRESSURE: 149 MMHG | TEMPERATURE: 98.4 F

## 2018-11-26 PROCEDURE — 3331090002 HH PPS REVENUE DEBIT

## 2018-11-26 PROCEDURE — 3331090001 HH PPS REVENUE CREDIT

## 2018-11-26 PROCEDURE — G0299 HHS/HOSPICE OF RN EA 15 MIN: HCPCS

## 2018-11-26 PROCEDURE — A6212 FOAM DRG <=16 SQ IN W/BORDER: HCPCS

## 2018-11-26 PROCEDURE — A6216 NON-STERILE GAUZE<=16 SQ IN: HCPCS

## 2018-11-26 PROCEDURE — A4452 WATERPROOF TAPE: HCPCS

## 2018-11-27 PROCEDURE — 3331090002 HH PPS REVENUE DEBIT

## 2018-11-27 PROCEDURE — 3331090001 HH PPS REVENUE CREDIT

## 2018-11-28 ENCOUNTER — HOME CARE VISIT (OUTPATIENT)
Dept: SCHEDULING | Facility: HOME HEALTH | Age: 83
End: 2018-11-28
Payer: COMMERCIAL

## 2018-11-28 PROCEDURE — 3331090001 HH PPS REVENUE CREDIT

## 2018-11-28 PROCEDURE — G0299 HHS/HOSPICE OF RN EA 15 MIN: HCPCS

## 2018-11-28 PROCEDURE — 3331090002 HH PPS REVENUE DEBIT

## 2018-11-29 VITALS
RESPIRATION RATE: 16 BRPM | DIASTOLIC BLOOD PRESSURE: 85 MMHG | TEMPERATURE: 99.2 F | OXYGEN SATURATION: 94 % | SYSTOLIC BLOOD PRESSURE: 144 MMHG | HEART RATE: 66 BPM

## 2018-11-29 PROCEDURE — 3331090002 HH PPS REVENUE DEBIT

## 2018-11-29 PROCEDURE — 3331090001 HH PPS REVENUE CREDIT

## 2018-11-30 ENCOUNTER — HOME CARE VISIT (OUTPATIENT)
Dept: SCHEDULING | Facility: HOME HEALTH | Age: 83
End: 2018-11-30
Payer: COMMERCIAL

## 2018-11-30 VITALS
DIASTOLIC BLOOD PRESSURE: 81 MMHG | TEMPERATURE: 97.8 F | RESPIRATION RATE: 18 BRPM | OXYGEN SATURATION: 96 % | HEART RATE: 69 BPM | SYSTOLIC BLOOD PRESSURE: 150 MMHG

## 2018-11-30 PROCEDURE — A4452 WATERPROOF TAPE: HCPCS

## 2018-11-30 PROCEDURE — A6212 FOAM DRG <=16 SQ IN W/BORDER: HCPCS

## 2018-11-30 PROCEDURE — 3331090002 HH PPS REVENUE DEBIT

## 2018-11-30 PROCEDURE — A6216 NON-STERILE GAUZE<=16 SQ IN: HCPCS

## 2018-11-30 PROCEDURE — 3331090001 HH PPS REVENUE CREDIT

## 2018-11-30 PROCEDURE — G0299 HHS/HOSPICE OF RN EA 15 MIN: HCPCS

## 2018-12-01 PROCEDURE — 3331090002 HH PPS REVENUE DEBIT

## 2018-12-01 PROCEDURE — 3331090001 HH PPS REVENUE CREDIT

## 2018-12-02 PROCEDURE — 3331090001 HH PPS REVENUE CREDIT

## 2018-12-02 PROCEDURE — 3331090002 HH PPS REVENUE DEBIT

## 2018-12-03 ENCOUNTER — HOME CARE VISIT (OUTPATIENT)
Dept: SCHEDULING | Facility: HOME HEALTH | Age: 83
End: 2018-12-03
Payer: COMMERCIAL

## 2018-12-03 VITALS
HEART RATE: 70 BPM | TEMPERATURE: 98.2 F | DIASTOLIC BLOOD PRESSURE: 86 MMHG | OXYGEN SATURATION: 96 % | SYSTOLIC BLOOD PRESSURE: 124 MMHG | RESPIRATION RATE: 18 BRPM

## 2018-12-03 PROCEDURE — 3331090002 HH PPS REVENUE DEBIT

## 2018-12-03 PROCEDURE — G0299 HHS/HOSPICE OF RN EA 15 MIN: HCPCS

## 2018-12-03 PROCEDURE — 3331090001 HH PPS REVENUE CREDIT

## 2018-12-04 PROCEDURE — 3331090002 HH PPS REVENUE DEBIT

## 2018-12-04 PROCEDURE — 3331090001 HH PPS REVENUE CREDIT

## 2018-12-05 ENCOUNTER — HOME CARE VISIT (OUTPATIENT)
Dept: SCHEDULING | Facility: HOME HEALTH | Age: 83
End: 2018-12-05
Payer: COMMERCIAL

## 2018-12-05 PROCEDURE — 3331090002 HH PPS REVENUE DEBIT

## 2018-12-05 PROCEDURE — 3331090001 HH PPS REVENUE CREDIT

## 2018-12-06 PROCEDURE — 3331090001 HH PPS REVENUE CREDIT

## 2018-12-06 PROCEDURE — 3331090002 HH PPS REVENUE DEBIT

## 2018-12-07 ENCOUNTER — HOME CARE VISIT (OUTPATIENT)
Dept: SCHEDULING | Facility: HOME HEALTH | Age: 83
End: 2018-12-07
Payer: COMMERCIAL

## 2018-12-07 VITALS
DIASTOLIC BLOOD PRESSURE: 64 MMHG | TEMPERATURE: 98.1 F | HEART RATE: 88 BPM | RESPIRATION RATE: 18 BRPM | SYSTOLIC BLOOD PRESSURE: 122 MMHG | OXYGEN SATURATION: 96 %

## 2018-12-07 PROCEDURE — 3331090001 HH PPS REVENUE CREDIT

## 2018-12-07 PROCEDURE — G0299 HHS/HOSPICE OF RN EA 15 MIN: HCPCS

## 2018-12-07 PROCEDURE — 3331090002 HH PPS REVENUE DEBIT

## 2018-12-08 PROCEDURE — 3331090002 HH PPS REVENUE DEBIT

## 2018-12-08 PROCEDURE — 3331090001 HH PPS REVENUE CREDIT

## 2018-12-09 PROCEDURE — 3331090002 HH PPS REVENUE DEBIT

## 2018-12-09 PROCEDURE — 3331090001 HH PPS REVENUE CREDIT

## 2018-12-10 ENCOUNTER — HOME CARE VISIT (OUTPATIENT)
Dept: SCHEDULING | Facility: HOME HEALTH | Age: 83
End: 2018-12-10
Payer: COMMERCIAL

## 2018-12-10 VITALS
RESPIRATION RATE: 18 BRPM | DIASTOLIC BLOOD PRESSURE: 64 MMHG | HEART RATE: 74 BPM | OXYGEN SATURATION: 98 % | TEMPERATURE: 98.2 F | SYSTOLIC BLOOD PRESSURE: 124 MMHG

## 2018-12-10 PROCEDURE — 3331090002 HH PPS REVENUE DEBIT

## 2018-12-10 PROCEDURE — A6219 GAUZE <= 16 SQ IN W/BORDER: HCPCS

## 2018-12-10 PROCEDURE — G0299 HHS/HOSPICE OF RN EA 15 MIN: HCPCS

## 2018-12-10 PROCEDURE — 3331090001 HH PPS REVENUE CREDIT

## 2018-12-11 PROCEDURE — 3331090001 HH PPS REVENUE CREDIT

## 2018-12-11 PROCEDURE — 3331090002 HH PPS REVENUE DEBIT

## 2018-12-12 ENCOUNTER — HOME CARE VISIT (OUTPATIENT)
Dept: SCHEDULING | Facility: HOME HEALTH | Age: 83
End: 2018-12-12
Payer: COMMERCIAL

## 2018-12-12 PROCEDURE — 3331090001 HH PPS REVENUE CREDIT

## 2018-12-12 PROCEDURE — 3331090002 HH PPS REVENUE DEBIT

## 2018-12-13 PROCEDURE — 3331090001 HH PPS REVENUE CREDIT

## 2018-12-13 PROCEDURE — 3331090002 HH PPS REVENUE DEBIT

## 2018-12-14 ENCOUNTER — APPOINTMENT (OUTPATIENT)
Dept: CT IMAGING | Age: 83
End: 2018-12-14
Attending: EMERGENCY MEDICINE
Payer: COMMERCIAL

## 2018-12-14 ENCOUNTER — APPOINTMENT (OUTPATIENT)
Dept: GENERAL RADIOLOGY | Age: 83
End: 2018-12-14
Attending: EMERGENCY MEDICINE
Payer: COMMERCIAL

## 2018-12-14 ENCOUNTER — HOSPITAL ENCOUNTER (EMERGENCY)
Age: 83
Discharge: HOME | End: 2018-12-14
Attending: EMERGENCY MEDICINE
Payer: COMMERCIAL

## 2018-12-14 ENCOUNTER — HOME CARE VISIT (OUTPATIENT)
Dept: SCHEDULING | Facility: HOME HEALTH | Age: 83
End: 2018-12-14
Payer: COMMERCIAL

## 2018-12-14 VITALS
WEIGHT: 120 LBS | BODY MASS INDEX: 18.83 KG/M2 | DIASTOLIC BLOOD PRESSURE: 68 MMHG | OXYGEN SATURATION: 96 % | SYSTOLIC BLOOD PRESSURE: 143 MMHG | HEART RATE: 68 BPM | RESPIRATION RATE: 14 BRPM | HEIGHT: 67 IN | TEMPERATURE: 97.7 F

## 2018-12-14 DIAGNOSIS — S01.81XA FACIAL LACERATION, INITIAL ENCOUNTER: ICD-10-CM

## 2018-12-14 DIAGNOSIS — S80.00XA CONTUSION OF KNEE, UNSPECIFIED LATERALITY, INITIAL ENCOUNTER: ICD-10-CM

## 2018-12-14 DIAGNOSIS — T14.8XXA MULTIPLE SKIN TEARS: ICD-10-CM
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
## 2018-12-14 LAB
ALBUMIN SERPL-MCNC: 2.9 G/DL (ref 3.2–4.6)
ALBUMIN/GLOB SERPL: 0.8 {RATIO} (ref 1.2–3.5)
ALP SERPL-CCNC: 77 U/L (ref 50–136)
ALT SERPL-CCNC: 19 U/L (ref 12–65)
ANION GAP SERPL CALC-SCNC: 8 MMOL/L (ref 7–16)
AST SERPL-CCNC: 15 U/L (ref 15–37)
BASOPHILS # BLD: 0 K/UL (ref 0–0.2)
BASOPHILS NFR BLD: 1 % (ref 0–2)
BILIRUB SERPL-MCNC: 0.7 MG/DL (ref 0.2–1.1)
BUN SERPL-MCNC: 12 MG/DL (ref 8–23)
CALCIUM SERPL-MCNC: 8.6 MG/DL (ref 8.3–10.4)
CHLORIDE SERPL-SCNC: 101 MMOL/L (ref 98–107)
CO2 SERPL-SCNC: 26 MMOL/L (ref 21–32)
CREAT SERPL-MCNC: 0.92 MG/DL (ref 0.6–1)
DIFFERENTIAL METHOD BLD: ABNORMAL
EOSINOPHIL # BLD: 0.1 K/UL (ref 0–0.8)
EOSINOPHIL NFR BLD: 1 % (ref 0.5–7.8)
ERYTHROCYTE [DISTWIDTH] IN BLOOD BY AUTOMATED COUNT: 14 % (ref 11.9–14.6)
GLOBULIN SER CALC-MCNC: 3.8 G/DL (ref 2.3–3.5)
GLUCOSE SERPL-MCNC: 90 MG/DL (ref 65–100)
HCT VFR BLD AUTO: 29.6 % (ref 35.8–46.3)
HGB BLD-MCNC: 9.7 G/DL (ref 11.7–15.4)
IMM GRANULOCYTES # BLD: 0 K/UL (ref 0–0.5)
IMM GRANULOCYTES NFR BLD AUTO: 1 % (ref 0–5)
LYMPHOCYTES # BLD: 0.7 K/UL (ref 0.5–4.6)
LYMPHOCYTES NFR BLD: 12 % (ref 13–44)
MCH RBC QN AUTO: 34.6 PG (ref 26.1–32.9)
MCHC RBC AUTO-ENTMCNC: 32.8 G/DL (ref 31.4–35)
MCV RBC AUTO: 105.7 FL (ref 79.6–97.8)
MONOCYTES # BLD: 0.7 K/UL (ref 0.1–1.3)
MONOCYTES NFR BLD: 11 % (ref 4–12)
NEUTS SEG # BLD: 4.9 K/UL (ref 1.7–8.2)
NEUTS SEG NFR BLD: 76 % (ref 43–78)
NRBC # BLD: 0 K/UL (ref 0–0.2)
PLATELET # BLD AUTO: 256 K/UL (ref 150–450)
PMV BLD AUTO: 11.7 FL (ref 9.4–12.3)
POTASSIUM SERPL-SCNC: 4 MMOL/L (ref 3.5–5.1)
PROT SERPL-MCNC: 6.7 G/DL (ref 6.3–8.2)
RBC # BLD AUTO: 2.8 M/UL (ref 4.05–5.2)
SODIUM SERPL-SCNC: 135 MMOL/L (ref 136–145)
WBC # BLD AUTO: 6.4 K/UL (ref 4.3–11.1)

## 2018-12-14 PROCEDURE — 70450 CT HEAD/BRAIN W/O DYE: CPT

## 2018-12-14 PROCEDURE — G0299 HHS/HOSPICE OF RN EA 15 MIN: HCPCS

## 2018-12-14 PROCEDURE — 73562 X-RAY EXAM OF KNEE 3: CPT

## 2018-12-14 PROCEDURE — A6258 TRANSPARENT FILM >16<=48 IN: HCPCS

## 2018-12-14 PROCEDURE — 99284 EMERGENCY DEPT VISIT MOD MDM: CPT | Performed by: EMERGENCY MEDICINE

## 2018-12-14 PROCEDURE — 85025 COMPLETE CBC W/AUTO DIFF WBC: CPT

## 2018-12-14 PROCEDURE — A6223 GAUZE >16<=48 NO W/SAL W/O B: HCPCS

## 2018-12-14 PROCEDURE — A6252 ABSORPT DRG >16 <=48 W/O BDR: HCPCS

## 2018-12-14 PROCEDURE — 81003 URINALYSIS AUTO W/O SCOPE: CPT | Performed by: EMERGENCY MEDICINE

## 2018-12-14 PROCEDURE — A6260 WOUND CLEANSER ANY TYPE/SIZE: HCPCS

## 2018-12-14 PROCEDURE — 3331090002 HH PPS REVENUE DEBIT

## 2018-12-14 PROCEDURE — 80053 COMPREHEN METABOLIC PANEL: CPT

## 2018-12-14 PROCEDURE — 3331090001 HH PPS REVENUE CREDIT

## 2018-12-14 PROCEDURE — A6209 FOAM DRSG <=16 SQ IN W/O BDR: HCPCS

## 2018-12-14 RX ORDER — MUPIROCIN 20 MG/G
OINTMENT TOPICAL 3 TIMES DAILY
Qty: 30 G | Refills: 0 | Status: SHIPPED | OUTPATIENT
Start: 2018-12-14 | End: 2018-12-24

## 2018-12-14 NOTE — DISCHARGE INSTRUCTIONS
Apply ointment as prescribed  Keep wounds clean and dry  Follow-up with your primary care physician  Return to the ER for any new or worsening symptoms    Bruises: Care Instructions  Your Care Instructions    Bruises occur when small blood vessels under the skin tear or rupture, most often from a twist, bump, or fall. Blood leaks into tissues under the skin and causes a black-and-blue spot that often turns colors, including purplish black, reddish blue, or yellowish green, as the bruise heals. Bruises hurt, but most are not serious and will go away on their own within 2 to 4 weeks. Sometimes, gravity causes them to spread down the body. A leg bruise usually will take longer to heal than a bruise on the face or arms. Follow-up care is a key part of your treatment and safety. Be sure to make and go to all appointments, and call your doctor if you are having problems. It's also a good idea to know your test results and keep a list of the medicines you take. How can you care for yourself at home? · Take pain medicines exactly as directed. ? If the doctor gave you a prescription medicine for pain, take it as prescribed. ? If you are not taking a prescription pain medicine, ask your doctor if you can take an over-the-counter medicine. · Put ice or a cold pack on the area for 10 to 20 minutes at a time. Put a thin cloth between the ice and your skin. · If you can, prop up the bruised area on pillows as much as possible for the next few days. Try to keep the bruise above the level of your heart. When should you call for help? Call your doctor now or seek immediate medical care if:    · You have signs of infection, such as:  ? Increased pain, swelling, warmth, or redness. ? Red streaks leading from the bruise. ? Pus draining from the bruise. ?  A fever.     · You have a bruise on your leg and signs of a blood clot, such as:  ? Increasing redness and swelling along with warmth, tenderness, and pain in the bruised area. ? Pain in your calf, back of the knee, thigh, or groin. ? Redness and swelling in your leg or groin.     · Your pain gets worse.    Watch closely for changes in your health, and be sure to contact your doctor if:    · You do not get better as expected. Where can you learn more? Go to http://benedicto-jhonathan.info/. Enter [phone] in the search box to learn more about \"Bruises: Care Instructions. \"  Current as of: November 20, 2017  Content Version: 11.8  © 5447-7437 [employer]. Care instructions adapted under license by [facility] (which disclaims liability or warranty for this information). If you have questions about a medical condition or this instruction, always ask your healthcare professional. [place] [address] any warranty or liability for your use of this information. Contusion: Care Instructions  Your Care Instructions    Contusion is the medical term for a bruise. It is the result of a direct blow or an impact, such as a fall. Contusions are common sports injuries. Most people think of a bruise as a black-and-blue spot. This happens when small blood vessels get torn and leak blood under the skin. But bones, muscles, and organs can also get bruised. This may damage deep tissues but not cause a bruise you can see. The doctor will do a physical exam to find the location of your contusion. You may also have tests to make sure you do not have a more serious injury, such as a broken bone or nerve damage. These may include X-rays or other imaging tests like a CT scan or MRI. Deep-tissue contusions may cause pain and swelling. But if there is no serious damage, they will often get better in a few weeks with home treatment. The doctor has checked you carefully, but problems can develop later. If you notice any problems or new symptoms, get medical treatment right away. Follow-up care is a key part of your treatment and safety.  Be sure to make and go to all appointments, and call your doctor if you are having problems. It's also a good idea to know your test results and keep a list of the medicines you take. How can you care for yourself at home? · Put ice or a cold pack on the sore area for 10 to 20 minutes at a time to stop swelling. Put a thin cloth between the ice pack and your skin. · Be safe with medicines. Read and follow all instructions on the label. ? If the doctor gave you a prescription medicine for pain, take it as prescribed. ? If you are not taking a prescription pain medicine, ask your doctor if you can take an over-the-counter medicine. · If you can, prop up the sore area on pillows as much as possible for the next few days. Try to keep the sore area above the level of your heart. When should you call for help? Call your doctor now or seek immediate medical care if:    · Your pain gets worse.     · You have new or worse swelling.     · You have tingling, weakness, or numbness in the area near the contusion.     · The area near the contusion is cold or pale.    Watch closely for changes in your health, and be sure to contact your doctor if:    · You do not get better as expected. Where can you learn more? Go to http://benedicto-jhonathan.info/. Enter B204 in the search box to learn more about \"Contusion: Care Instructions. \"  Current as of: November 20, 2017  Content Version: 11.8  © 6140-7865 [email or web address]. Care instructions adapted under license by [employer] (which disclaims liability or warranty for this information). If you have questions about a medical condition or this instruction, always ask your healthcare professional. [place] [address] any warranty or liability for your use of this information.            Preventing Falls: Care Instructions  Your Care Instructions    Getting around your home safely can be a challenge if you have injuries or health problems that make it easy for you to fall. Loose rugs and furniture in walkways are among the dangers for many older people who have problems walking or who have poor eyesight. People who have conditions such as arthritis, osteoporosis, or dementia also have to be careful not to fall. You can make your home safer with a few simple measures. Follow-up care is a key part of your treatment and safety. Be sure to make and go to all appointments, and call your doctor if you are having problems. It's also a good idea to know your test results and keep a list of the medicines you take. How can you care for yourself at home? Taking care of yourself  · You may get dizzy if you do not drink enough water. To prevent dehydration, drink plenty of fluids, enough so that your urine is light yellow or clear like water. Choose water and other caffeine-free clear liquids. If you have kidney, heart, or liver disease and have to limit fluids, talk with your doctor before you increase the amount of fluids you drink. · Exercise regularly to improve your strength, muscle tone, and balance. Walk if you can. Swimming may be a good choice if you cannot walk easily. · Have your vision and hearing checked each year or any time you notice a change. If you have trouble seeing and hearing, you might not be able to avoid objects and could lose your balance. · Know the side effects of the medicines you take. Ask your doctor or pharmacist whether the medicines you take can affect your balance. Sleeping pills or sedatives can affect your balance. · Limit the amount of alcohol you drink. Alcohol can impair your balance and other senses. · Ask your doctor whether calluses or corns on your feet need to be removed. If you wear loose-fitting shoes because of calluses or corns, you can lose your balance and fall. · Talk to your doctor if you have numbness in your feet.   Preventing falls at home  · Remove raised doorway thresholds, throw rugs, and clutter. Repair loose carpet or raised areas in the floor. · Move furniture and electrical cords to keep them out of walking paths. · Use nonskid floor wax, and wipe up spills right away, especially on ceramic tile floors. · If you use a walker or cane, put rubber tips on it. If you use crutches, clean the bottoms of them regularly with an abrasive pad, such as steel wool. · Keep your house well lit, especially [place], and outside walkways. Use night-lights in areas such as hallways and bathrooms. Add extra light switches or use remote switches (such as switches that go on or off when you clap your hands) to make it easier to turn lights on if you have to get up during the night. · Install sturdy handrails on stairways. · Move items in your cabinets so that the things you use a lot are on the lower shelves (about waist level). · Keep a cordless phone and a flashlight with new batteries by your bed. If possible, put a phone in each of the main rooms of your house, or carry a cell phone in case you fall and cannot reach a phone. Or, you can wear a device around your neck or wrist. You push a button that sends a signal for help. · Wear low-heeled shoes that fit well and give your feet good support. Use footwear with nonskid soles. Check the heels and soles of your shoes for wear. Repair or replace worn heels or soles. · Do not wear socks without shoes on wood floors. · Walk on the grass when the sidewalks are slippery. If you live in an area that gets snow and ice in the winter, sprinkle salt on slippery steps and sidewalks. Preventing falls in the bath  · Install grab bars and nonskid mats inside and outside your shower or tub and near the toilet and sinks. · Use shower chairs and bath benches. · Use a hand-held shower head that will allow you to sit while showering.   · Get into a tub or shower by putting the weaker leg in first. Get out of a tub or shower with your strong side first.  · Repair loose toilet seats and consider installing a raised toilet seat to make getting on and off the toilet easier. · Keep your bathroom door unlocked while you are in the shower. Where can you learn more? Go to http://benedicto-jhonathan.info/. Enter [phone] in the search box to learn more about \"Preventing Falls: Care Instructions. \"  Current as of: March 16, 2018  Content Version: 11.8  © 0973-2756 [employer]. Care instructions adapted under license by [employer] (which disclaims liability or warranty for this information). If you have questions about a medical condition or this instruction, always ask your healthcare professional. [address] any warranty or liability for your use of this information. Skin Tears: Care Instructions  Your Care Instructions  As we get older, our skin gets drier and more fragile. Sometimes this can cause the outer layers of skin to split and tear open. Skin tears are treated in different ways. In some cases, doctors use pieces of tape called Steri-Strips to pull the skin together and help it heal. Other times, it's best to leave the tear open and cover it with a special wound-care bandage. Skin tears are usually not serious. They usually heal in a few weeks. But how long you take to heal depends on your body and the type of tear you have. Sometimes the torn piece of skin is used to protect the wound while it heals. But that piece of skin does not heal. It may fall off on its own. Or the doctor may remove it. As your tear heals, it's important to keep it clean to help prevent infection. The doctor has checked you carefully, but problems can develop later. If you notice any problems or new symptoms, get medical treatment right away. Follow-up care is a key part of your treatment and safety. Be sure to make and go to all appointments, and call your doctor if you are having problems.  It's also a good idea to know your test results and keep a list of the medicines you take. How can you care for yourself at home? · If you have pain, ask your doctor if you can take an over-the-counter pain medicine, such as acetaminophen (Tylenol), ibuprofen (Advil, Motrin), or naproxen (Aleve). Be safe with medicines. Read and follow all instructions on the label. · If you have a bandage, follow your doctor's instructions for changing it. · If you have Steri-Strips, leave them on until they fall off. · Follow your doctor's instructions about bathing. · Gently wash the skin tear with plain water 2 times a day. Do not rub the area. · Let the area air dry. Or you can pat it carefully with a soft towel. When should you call for help? Call your doctor now or seek immediate medical care if:    · You have signs of infection, such as:  ? Increased pain, swelling, warmth, or redness around the tear. ? Red streaks leading from the tear. ? Pus draining from the tear. ? A fever.     · The tear starts to bleed a lot. Small amounts of blood are normal.    Watch closely for changes in your health, and be sure to contact your doctor if:    · You do not get better as expected. Where can you learn more? Go to http://benedicto-jhonathan.info/. Enter U256 in the search box to learn more about \"Skin Tears: Care Instructions. \"  Current as of: November 20, 2017  Content Version: 11.8  © 5347-9324 [employer]. Care instructions adapted under license by [employer] (which disclaims liability or warranty for this information). If you have questions about a medical condition or this instruction, always ask your healthcare professional. [address] any warranty or liability for your use of this information.

## 2018-12-14 NOTE — ED TRIAGE NOTES
Pt arrives EMS from home with family, fell this morning using her walker walking to the bathroom. EMS states it appears as if pt fell against glass shower door, cracked door did not shatter. Skin tear to right forehead, wrapped by EMS. Large skin tears on bilateral knees. No LOC. Remembers the event. Deaf with hearing aids. No blood thinners. Wound to right lower leg that she is currently receiving abx for. VS stable.  A&o x4

## 2018-12-14 NOTE — ED NOTES
Cleaned all wounds with wound . Covered with neosporin, non-adherent dressing and greg wrap. Pt tolerated well. States she is ready to go home.

## 2018-12-14 NOTE — ED PROVIDER NOTES
Patient presents to ER after having a fall. Reports she was walking to the bathroom when she slipped and fell. Didn't hit her head as well as bilateral knees. Has been able to ambulate minimally since fall. Reports significant skin tears to right femoral head and bilateral knees. She denies any loss of consciousness. Patient currently is not on any known blood      The history is provided by the patient and the EMS personnel. Fall   The accident occurred 1 to 2 hours ago. The fall occurred while walking. She fell from a height of ground level. The point of impact was the head, left knee and right knee. The pain is present in the head, left knee and right knee. The pain is at a severity of 2/10. She was ambulatory at the scene. There was no drug use involved in the accident. Associated symptoms include laceration. Pertinent negatives include no numbness, no abdominal pain, no nausea, no vomiting, no extremity weakness, no loss of consciousness and no tingling. The risk factors include being elderly.          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    Constipation     Fatigue 10/31/2012    GERD (gastroesophageal reflux disease)     Glaucoma 1/3/2013    Hearing loss, sensorineural 1/3/2013    History of subdural hematoma (post traumatic)     Right frontal after fall 6/2016    Hypercholesterolemia     Hyperlipemia 10/31/2012    Hyperlipidemia 10/31/2012    Hypertension 10/31/2012    Macular degeneration 1/3/2013    Mitral valve disorders(424.0) 10/31/2012    Osteoarthrosis, unspecified whether generalized or localized, lower leg 3/13/2013    Other malaise and fatigue 10/31/2012    Senile osteoporosis 10/31/2012    Skin cancer of scalp 2012    TIA (transient ischemic attack)     Unspecified hypothyroidism 10/31/2012       Past Surgical History:   Procedure Laterality Date    HX APPENDECTOMY      HX COLONOSCOPY      HX MALIGNANT SKIN LESION EXCISION      scalp    RECTAL SENSATION TEST, BALLOON           Family History:   Problem Relation Age of Onset    Hypertension Mother     Heart Disease Father     Breast Cancer Neg Hx        Social History     Socioeconomic History    Marital status: SINGLE     Spouse name: Not on file    Number of children: Not on file    Years of education: Not on file    Highest education level: Not on file   Social Needs    Financial resource strain: Not on file    Food insecurity - worry: Not on file    Food insecurity - inability: Not on file   RealD needs - medical: Not on file   RealD needs - non-medical: Not on file   Occupational History    Not on file   Tobacco Use    Smoking status: Never Smoker    Smokeless tobacco: Never Used   Substance and Sexual Activity    Alcohol use: No    Drug use: No    Sexual activity: Not on file   Other Topics Concern    Not on file   Social History Narrative    Not on file         ALLERGIES: Patient has no known allergies. Review of Systems   Constitutional: Negative for fatigue. HENT: Negative for congestion and dental problem. Eyes: Negative for photophobia and visual disturbance. Respiratory: Negative for choking and chest tightness. Cardiovascular: Negative for palpitations and leg swelling. Gastrointestinal: Negative for abdominal pain, nausea and vomiting. Endocrine: Negative for polydipsia, polyphagia and polyuria. Genitourinary: Negative for flank pain and urgency. Musculoskeletal: Negative for back pain and extremity weakness. Skin: Positive for wound. Negative for pallor and rash. Neurological: Negative for tingling, loss of consciousness, syncope, speech difficulty and numbness. Hematological: Negative for adenopathy. Does not bruise/bleed easily. Psychiatric/Behavioral: Negative for behavioral problems. All other systems reviewed and are negative.       Vitals:    12/14/18 0711   BP: 148/70   Pulse: 64   Resp: 14   Temp: 97.7 °F (36.5 °C) SpO2: 95%   Weight: 54.4 kg (120 lb)   Height: 5' 7\" (1.702 m)            Physical Exam   Constitutional: She is oriented to person, place, and time. She appears well-developed and well-nourished. HENT:   Head: Head is with abrasion, with contusion and with laceration. Eyes: Conjunctivae and EOM are normal. Pupils are equal, round, and reactive to light. Cardiovascular: Normal rate and regular rhythm. Pulmonary/Chest: Effort normal and breath sounds normal. No stridor. No respiratory distress. Abdominal: Soft. Bowel sounds are normal.   Musculoskeletal: She exhibits deformity. She exhibits no edema. Right knee: She exhibits decreased range of motion and swelling. Left knee: She exhibits decreased range of motion and swelling. Legs:  Neurological: She is alert and oriented to person, place, and time. No cranial nerve deficit. Coordination normal.   Skin: Laceration noted. Nursing note and vitals reviewed. MDM  Number of Diagnoses or Management Options  Diagnosis management comments: We'll obtain CT scan of head as well as bilateral knee x-rays. Check basic labs and urinalysis    9:51 AM  Labs are fairly stable. Chemistry panel stable. Urinalysis is negative for infection. CT scan of head shows no acute abnormalities, chronic changes noted. X-ray of bilateral knee shows no acute abnormalities, chronic changes noted    Wounds were all cleaned and dressed here. Patient was able to stand to urinate without problems. Discussed results of testing in detail with family as well as patient. We'll discharge home, discussed further outpatient wound care.   Will place on oral antibiotics for prophylaxis of skin wound       Amount and/or Complexity of Data Reviewed  Clinical lab tests: ordered and reviewed  Tests in the radiology section of CPT®: ordered and reviewed    Risk of Complications, Morbidity, and/or Mortality  Presenting problems: moderate  Diagnostic procedures: low  Management options: low          Results Include:    Recent Results (from the past 24 hour(s))   CBC WITH AUTOMATED DIFF    Collection Time: 12/14/18  8:02 AM   Result Value Ref Range    WBC 6.4 4.3 - 11.1 K/uL    RBC 2.80 (L) 4.05 - 5.2 M/uL    HGB 9.7 (L) 11.7 - 15.4 g/dL    HCT 29.6 (L) 35.8 - 46.3 %    .7 (H) 79.6 - 97.8 FL    MCH 34.6 (H) 26.1 - 32.9 PG    MCHC 32.8 31.4 - 35.0 g/dL    RDW 14.0 11.9 - 14.6 %    PLATELET 659 996 - 001 K/uL    MPV 11.7 9.4 - 12.3 FL    ABSOLUTE NRBC 0.00 0.0 - 0.2 K/uL    DF AUTOMATED      NEUTROPHILS 76 43 - 78 %    LYMPHOCYTES 12 (L) 13 - 44 %    MONOCYTES 11 4.0 - 12.0 %    EOSINOPHILS 1 0.5 - 7.8 %    BASOPHILS 1 0.0 - 2.0 %    IMMATURE GRANULOCYTES 1 0.0 - 5.0 %    ABS. NEUTROPHILS 4.9 1.7 - 8.2 K/UL    ABS. LYMPHOCYTES 0.7 0.5 - 4.6 K/UL    ABS. MONOCYTES 0.7 0.1 - 1.3 K/UL    ABS. EOSINOPHILS 0.1 0.0 - 0.8 K/UL    ABS. BASOPHILS 0.0 0.0 - 0.2 K/UL    ABS. IMM. GRANS. 0.0 0.0 - 0.5 K/UL   METABOLIC PANEL, COMPREHENSIVE    Collection Time: 12/14/18  8:02 AM   Result Value Ref Range    Sodium 135 (L) 136 - 145 mmol/L    Potassium 4.0 3.5 - 5.1 mmol/L    Chloride 101 98 - 107 mmol/L    CO2 26 21 - 32 mmol/L    Anion gap 8 7 - 16 mmol/L    Glucose 90 65 - 100 mg/dL    BUN 12 8 - 23 MG/DL    Creatinine 0.92 0.6 - 1.0 MG/DL    GFR est AA >60 >60 ml/min/1.73m2    GFR est non-AA >60 >60 ml/min/1.73m2    Calcium 8.6 8.3 - 10.4 MG/DL    Bilirubin, total 0.7 0.2 - 1.1 MG/DL    ALT (SGPT) 19 12 - 65 U/L    AST (SGOT) 15 15 - 37 U/L    Alk. phosphatase 77 50 - 136 U/L    Protein, total 6.7 6.3 - 8.2 g/dL    Albumin 2.9 (L) 3.2 - 4.6 g/dL    Globulin 3.8 (H) 2.3 - 3.5 g/dL    A-G Ratio 0.8 (L) 1.2 - 3.5       Voice dictation software was used during the making of this note. This software is not perfect and grammatical and other typographical errors may be present. This note has been proofread, but may still contain errors.   Jayne Matta MD; 12/14/2018 @9:52 AM ===================================================================        Procedures

## 2018-12-14 NOTE — ED NOTES
I have reviewed discharge instructions with the patient. The patient verbalized understanding. Patient left ED via Discharge Method: wheelchair to Home with family. Opportunity for questions and clarification provided. Patient given 1 scripts. To continue your aftercare when you leave the hospital, you may receive an automated call from our care team to check in on how you are doing. This is a free service and part of our promise to provide the best care and service to meet your aftercare needs.  If you have questions, or wish to unsubscribe from this service please call [phone]. Thank you for Choosing our [facility].

## 2018-12-15 VITALS
RESPIRATION RATE: 18 BRPM | SYSTOLIC BLOOD PRESSURE: 122 MMHG | TEMPERATURE: 97.8 F | OXYGEN SATURATION: 98 % | DIASTOLIC BLOOD PRESSURE: 64 MMHG | HEART RATE: 89 BPM

## 2018-12-15 PROCEDURE — 3331090002 HH PPS REVENUE DEBIT

## 2018-12-15 PROCEDURE — 3331090001 HH PPS REVENUE CREDIT

## 2018-12-16 ENCOUNTER — HOME CARE VISIT (OUTPATIENT)
Dept: SCHEDULING | Facility: HOME HEALTH | Age: 83
End: 2018-12-16
Payer: COMMERCIAL

## 2018-12-16 PROCEDURE — G0299 HHS/HOSPICE OF RN EA 15 MIN: HCPCS

## 2018-12-16 PROCEDURE — 3331090001 HH PPS REVENUE CREDIT

## 2018-12-16 PROCEDURE — 3331090002 HH PPS REVENUE DEBIT

## 2018-12-17 ENCOUNTER — HOME CARE VISIT (OUTPATIENT)
Dept: SCHEDULING | Facility: HOME HEALTH | Age: 83
End: 2018-12-17
Payer: COMMERCIAL

## 2018-12-17 VITALS
OXYGEN SATURATION: 99 % | TEMPERATURE: 97.9 F | DIASTOLIC BLOOD PRESSURE: 60 MMHG | SYSTOLIC BLOOD PRESSURE: 110 MMHG | RESPIRATION RATE: 18 BRPM | HEART RATE: 84 BPM

## 2018-12-17 VITALS
DIASTOLIC BLOOD PRESSURE: 60 MMHG | RESPIRATION RATE: 20 BRPM | TEMPERATURE: 97.1 F | HEART RATE: 67 BPM | SYSTOLIC BLOOD PRESSURE: 104 MMHG

## 2018-12-17 PROCEDURE — 3331090001 HH PPS REVENUE CREDIT

## 2018-12-17 PROCEDURE — G0299 HHS/HOSPICE OF RN EA 15 MIN: HCPCS

## 2018-12-17 PROCEDURE — 3331090002 HH PPS REVENUE DEBIT

## 2018-12-18 PROCEDURE — 3331090001 HH PPS REVENUE CREDIT

## 2018-12-18 PROCEDURE — 3331090002 HH PPS REVENUE DEBIT

## 2018-12-19 ENCOUNTER — HOME CARE VISIT (OUTPATIENT)
Dept: SCHEDULING | Facility: HOME HEALTH | Age: 83
End: 2018-12-19
Payer: COMMERCIAL

## 2018-12-19 VITALS
SYSTOLIC BLOOD PRESSURE: 102 MMHG | OXYGEN SATURATION: 97 % | HEART RATE: 69 BPM | DIASTOLIC BLOOD PRESSURE: 62 MMHG | RESPIRATION RATE: 18 BRPM | TEMPERATURE: 98.1 F

## 2018-12-19 PROCEDURE — G0299 HHS/HOSPICE OF RN EA 15 MIN: HCPCS

## 2018-12-19 PROCEDURE — A6258 TRANSPARENT FILM >16<=48 IN: HCPCS

## 2018-12-19 PROCEDURE — A6446 CONFORM BAND S W>=3" <5"/YD: HCPCS

## 2018-12-19 PROCEDURE — A6223 GAUZE >16<=48 NO W/SAL W/O B: HCPCS

## 2018-12-19 PROCEDURE — 3331090001 HH PPS REVENUE CREDIT

## 2018-12-19 PROCEDURE — 3331090002 HH PPS REVENUE DEBIT

## 2018-12-20 PROCEDURE — 3331090001 HH PPS REVENUE CREDIT

## 2018-12-20 PROCEDURE — 3331090002 HH PPS REVENUE DEBIT

## 2018-12-21 ENCOUNTER — HOME CARE VISIT (OUTPATIENT)
Dept: SCHEDULING | Facility: HOME HEALTH | Age: 83
End: 2018-12-21
Payer: COMMERCIAL

## 2018-12-21 PROCEDURE — 3331090002 HH PPS REVENUE DEBIT

## 2018-12-21 PROCEDURE — G0299 HHS/HOSPICE OF RN EA 15 MIN: HCPCS

## 2018-12-21 PROCEDURE — 3331090001 HH PPS REVENUE CREDIT

## 2018-12-22 PROCEDURE — 3331090001 HH PPS REVENUE CREDIT

## 2018-12-22 PROCEDURE — 3331090002 HH PPS REVENUE DEBIT

## 2018-12-23 VITALS
OXYGEN SATURATION: 98 % | DIASTOLIC BLOOD PRESSURE: 66 MMHG | SYSTOLIC BLOOD PRESSURE: 116 MMHG | HEART RATE: 78 BPM | RESPIRATION RATE: 18 BRPM | TEMPERATURE: 98.2 F

## 2018-12-23 PROCEDURE — 3331090001 HH PPS REVENUE CREDIT

## 2018-12-23 PROCEDURE — 3331090002 HH PPS REVENUE DEBIT

## 2018-12-24 ENCOUNTER — HOME CARE VISIT (OUTPATIENT)
Dept: SCHEDULING | Facility: HOME HEALTH | Age: 83
End: 2018-12-24
Payer: COMMERCIAL

## 2018-12-24 PROCEDURE — 3331090001 HH PPS REVENUE CREDIT

## 2018-12-24 PROCEDURE — 3331090002 HH PPS REVENUE DEBIT

## 2018-12-24 PROCEDURE — G0299 HHS/HOSPICE OF RN EA 15 MIN: HCPCS

## 2018-12-25 PROCEDURE — 3331090001 HH PPS REVENUE CREDIT

## 2018-12-25 PROCEDURE — 3331090002 HH PPS REVENUE DEBIT

## 2018-12-26 ENCOUNTER — HOME CARE VISIT (OUTPATIENT)
Dept: SCHEDULING | Facility: HOME HEALTH | Age: 83
End: 2018-12-26
Payer: COMMERCIAL

## 2018-12-26 VITALS
TEMPERATURE: 97.9 F | HEART RATE: 64 BPM | OXYGEN SATURATION: 98 % | SYSTOLIC BLOOD PRESSURE: 110 MMHG | DIASTOLIC BLOOD PRESSURE: 62 MMHG | RESPIRATION RATE: 18 BRPM

## 2018-12-26 VITALS
RESPIRATION RATE: 18 BRPM | TEMPERATURE: 98.2 F | OXYGEN SATURATION: 98 % | DIASTOLIC BLOOD PRESSURE: 62 MMHG | HEART RATE: 68 BPM | SYSTOLIC BLOOD PRESSURE: 116 MMHG

## 2018-12-26 PROCEDURE — 3331090001 HH PPS REVENUE CREDIT

## 2018-12-26 PROCEDURE — 3331090002 HH PPS REVENUE DEBIT

## 2018-12-26 PROCEDURE — G0299 HHS/HOSPICE OF RN EA 15 MIN: HCPCS

## 2018-12-27 PROCEDURE — 3331090002 HH PPS REVENUE DEBIT

## 2018-12-27 PROCEDURE — 3331090001 HH PPS REVENUE CREDIT

## 2018-12-28 ENCOUNTER — HOME CARE VISIT (OUTPATIENT)
Dept: SCHEDULING | Facility: HOME HEALTH | Age: 83
End: 2018-12-28
Payer: COMMERCIAL

## 2018-12-28 VITALS
DIASTOLIC BLOOD PRESSURE: 62 MMHG | OXYGEN SATURATION: 98 % | RESPIRATION RATE: 18 BRPM | TEMPERATURE: 97.9 F | HEART RATE: 84 BPM | SYSTOLIC BLOOD PRESSURE: 116 MMHG

## 2018-12-28 PROCEDURE — 3331090002 HH PPS REVENUE DEBIT

## 2018-12-28 PROCEDURE — G0299 HHS/HOSPICE OF RN EA 15 MIN: HCPCS

## 2018-12-28 PROCEDURE — 3331090001 HH PPS REVENUE CREDIT

## 2018-12-29 PROCEDURE — 3331090001 HH PPS REVENUE CREDIT

## 2018-12-29 PROCEDURE — 3331090002 HH PPS REVENUE DEBIT

## 2018-12-30 PROCEDURE — 3331090001 HH PPS REVENUE CREDIT

## 2018-12-30 PROCEDURE — 3331090002 HH PPS REVENUE DEBIT

## 2018-12-31 ENCOUNTER — HOME CARE VISIT (OUTPATIENT)
Dept: SCHEDULING | Facility: HOME HEALTH | Age: 83
End: 2018-12-31
Payer: COMMERCIAL

## 2018-12-31 VITALS
TEMPERATURE: 98.1 F | SYSTOLIC BLOOD PRESSURE: 122 MMHG | OXYGEN SATURATION: 98 % | RESPIRATION RATE: 18 BRPM | DIASTOLIC BLOOD PRESSURE: 62 MMHG | HEART RATE: 85 BPM

## 2018-12-31 PROCEDURE — A6252 ABSORPT DRG >16 <=48 W/O BDR: HCPCS

## 2018-12-31 PROCEDURE — A6209 FOAM DRSG <=16 SQ IN W/O BDR: HCPCS

## 2018-12-31 PROCEDURE — A6258 TRANSPARENT FILM >16<=48 IN: HCPCS

## 2018-12-31 PROCEDURE — G0299 HHS/HOSPICE OF RN EA 15 MIN: HCPCS

## 2018-12-31 PROCEDURE — A6260 WOUND CLEANSER ANY TYPE/SIZE: HCPCS

## 2018-12-31 PROCEDURE — 3331090002 HH PPS REVENUE DEBIT

## 2018-12-31 PROCEDURE — 3331090001 HH PPS REVENUE CREDIT

## 2018-12-31 PROCEDURE — A6223 GAUZE >16<=48 NO W/SAL W/O B: HCPCS

## 2019-01-01 ENCOUNTER — HOME CARE VISIT (OUTPATIENT)
Dept: SCHEDULING | Facility: HOME HEALTH | Age: 84
End: 2019-01-01
Payer: COMMERCIAL

## 2019-01-01 ENCOUNTER — HOSPITAL ENCOUNTER (OUTPATIENT)
Dept: WOUND CARE | Age: 84
Discharge: HOME | End: 2019-09-13
Attending: PHYSICAL MEDICINE & REHABILITATION
Payer: COMMERCIAL

## 2019-01-01 ENCOUNTER — HOSPITAL ENCOUNTER (OUTPATIENT)
Dept: WOUND CARE | Age: 84
Discharge: HOME | End: 2019-11-08
Attending: PHYSICAL MEDICINE & REHABILITATION
Payer: COMMERCIAL

## 2019-01-01 ENCOUNTER — HOSPITAL ENCOUNTER (OUTPATIENT)
Dept: WOUND CARE | Age: 84
Discharge: HOME | End: 2019-06-07
Attending: PHYSICAL MEDICINE & REHABILITATION
Payer: COMMERCIAL

## 2019-01-01 ENCOUNTER — HOSPITAL ENCOUNTER (OUTPATIENT)
Dept: WOUND CARE | Age: 84
Discharge: HOME | End: 2019-07-26
Attending: PHYSICAL MEDICINE & REHABILITATION
Payer: COMMERCIAL

## 2019-01-01 ENCOUNTER — HOME HEALTH ADMISSION (OUTPATIENT)
Dept: HOME HEALTH SERVICES | Facility: HOME HEALTH | Age: 84
End: 2019-01-01

## 2019-01-01 ENCOUNTER — HOME CARE VISIT (OUTPATIENT)
Dept: SCHEDULING | Facility: HOME HEALTH | Age: 84
End: 2019-01-01

## 2019-01-01 ENCOUNTER — HOME CARE VISIT (OUTPATIENT)
Dept: HOME HEALTH SERVICES | Facility: HOME HEALTH | Age: 84
End: 2019-01-01

## 2019-01-01 ENCOUNTER — HOME CARE VISIT (OUTPATIENT)
Dept: HOME HEALTH SERVICES | Facility: HOME HEALTH | Age: 84
End: 2019-01-01
Payer: COMMERCIAL

## 2019-01-01 ENCOUNTER — HOSPITAL ENCOUNTER (OUTPATIENT)
Dept: WOUND CARE | Age: 84
Discharge: HOME | End: 2019-06-28
Attending: PHYSICAL MEDICINE & REHABILITATION
Payer: COMMERCIAL

## 2019-01-01 ENCOUNTER — HOSPITAL ENCOUNTER (OUTPATIENT)
Dept: WOUND CARE | Age: 84
Discharge: HOME | End: 2019-07-12
Attending: PHYSICAL MEDICINE & REHABILITATION
Payer: COMMERCIAL

## 2019-01-01 ENCOUNTER — HOSPITAL ENCOUNTER (OUTPATIENT)
Dept: WOUND CARE | Age: 84
Discharge: HOME | End: 2019-08-23
Attending: PHYSICAL MEDICINE & REHABILITATION
Payer: COMMERCIAL

## 2019-01-01 ENCOUNTER — HOSPITAL ENCOUNTER (OUTPATIENT)
Dept: WOUND CARE | Age: 84
Discharge: HOME | End: 2019-10-11
Attending: PHYSICAL MEDICINE & REHABILITATION
Payer: COMMERCIAL

## 2019-01-01 ENCOUNTER — HOSPITAL ENCOUNTER (OUTPATIENT)
Dept: WOUND CARE | Age: 84
Discharge: HOME | End: 2019-06-14
Attending: PHYSICAL MEDICINE & REHABILITATION
Payer: COMMERCIAL

## 2019-01-01 ENCOUNTER — HOSPITAL ENCOUNTER (OUTPATIENT)
Dept: WOUND CARE | Age: 84
Discharge: HOME | End: 2019-08-09
Attending: PHYSICAL MEDICINE & REHABILITATION
Payer: COMMERCIAL

## 2019-01-01 ENCOUNTER — HOME HEALTH ADMISSION (OUTPATIENT)
Dept: HOME HEALTH SERVICES | Facility: HOME HEALTH | Age: 84
End: 2019-01-01
Payer: COMMERCIAL

## 2019-01-01 VITALS
WEIGHT: 121.25 LBS | SYSTOLIC BLOOD PRESSURE: 104 MMHG | BODY MASS INDEX: 19.21 KG/M2 | SYSTOLIC BLOOD PRESSURE: 136 MMHG | WEIGHT: 122.4 LBS | DIASTOLIC BLOOD PRESSURE: 90 MMHG | HEIGHT: 67 IN | TEMPERATURE: 97 F | RESPIRATION RATE: 18 BRPM | HEART RATE: 63 BPM | RESPIRATION RATE: 16 BRPM | HEIGHT: 67 IN | DIASTOLIC BLOOD PRESSURE: 58 MMHG | HEART RATE: 61 BPM | OXYGEN SATURATION: 94 % | SYSTOLIC BLOOD PRESSURE: 158 MMHG | RESPIRATION RATE: 18 BRPM | TEMPERATURE: 98.1 F | HEART RATE: 68 BPM | DIASTOLIC BLOOD PRESSURE: 81 MMHG | BODY MASS INDEX: 19.03 KG/M2 | TEMPERATURE: 97.8 F | OXYGEN SATURATION: 96 %

## 2019-01-01 VITALS
DIASTOLIC BLOOD PRESSURE: 74 MMHG | HEART RATE: 72 BPM | TEMPERATURE: 97.9 F | RESPIRATION RATE: 18 BRPM | OXYGEN SATURATION: 97 % | SYSTOLIC BLOOD PRESSURE: 122 MMHG

## 2019-01-01 VITALS
OXYGEN SATURATION: 96 % | TEMPERATURE: 98.1 F | RESPIRATION RATE: 16 BRPM | SYSTOLIC BLOOD PRESSURE: 118 MMHG | DIASTOLIC BLOOD PRESSURE: 70 MMHG | HEART RATE: 70 BPM

## 2019-01-01 VITALS
TEMPERATURE: 97.9 F | RESPIRATION RATE: 16 BRPM | OXYGEN SATURATION: 97 % | HEART RATE: 79 BPM | SYSTOLIC BLOOD PRESSURE: 138 MMHG | DIASTOLIC BLOOD PRESSURE: 82 MMHG

## 2019-01-01 VITALS
RESPIRATION RATE: 16 BRPM | SYSTOLIC BLOOD PRESSURE: 132 MMHG | DIASTOLIC BLOOD PRESSURE: 64 MMHG | HEART RATE: 56 BPM | OXYGEN SATURATION: 98 % | TEMPERATURE: 98.6 F

## 2019-01-01 VITALS
SYSTOLIC BLOOD PRESSURE: 104 MMHG | HEART RATE: 56 BPM | TEMPERATURE: 97.8 F | OXYGEN SATURATION: 96 % | DIASTOLIC BLOOD PRESSURE: 60 MMHG

## 2019-01-01 VITALS
TEMPERATURE: 97.9 F | RESPIRATION RATE: 20 BRPM | DIASTOLIC BLOOD PRESSURE: 66 MMHG | SYSTOLIC BLOOD PRESSURE: 128 MMHG | HEART RATE: 64 BPM | OXYGEN SATURATION: 97 %

## 2019-01-01 VITALS
DIASTOLIC BLOOD PRESSURE: 68 MMHG | OXYGEN SATURATION: 96 % | RESPIRATION RATE: 16 BRPM | TEMPERATURE: 98.7 F | HEART RATE: 80 BPM | SYSTOLIC BLOOD PRESSURE: 128 MMHG

## 2019-01-01 VITALS
OXYGEN SATURATION: 95 % | HEART RATE: 63 BPM | DIASTOLIC BLOOD PRESSURE: 80 MMHG | SYSTOLIC BLOOD PRESSURE: 110 MMHG | RESPIRATION RATE: 16 BRPM

## 2019-01-01 VITALS
SYSTOLIC BLOOD PRESSURE: 112 MMHG | DIASTOLIC BLOOD PRESSURE: 64 MMHG | TEMPERATURE: 97.1 F | HEART RATE: 60 BPM | OXYGEN SATURATION: 94 % | RESPIRATION RATE: 16 BRPM

## 2019-01-01 VITALS
HEART RATE: 70 BPM | SYSTOLIC BLOOD PRESSURE: 132 MMHG | DIASTOLIC BLOOD PRESSURE: 79 MMHG | HEART RATE: 61 BPM | RESPIRATION RATE: 18 BRPM | WEIGHT: 120 LBS | BODY MASS INDEX: 18.83 KG/M2 | RESPIRATION RATE: 18 BRPM | BODY MASS INDEX: 19.12 KG/M2 | HEIGHT: 67 IN | DIASTOLIC BLOOD PRESSURE: 75 MMHG | SYSTOLIC BLOOD PRESSURE: 139 MMHG | WEIGHT: 121.8 LBS | SYSTOLIC BLOOD PRESSURE: 148 MMHG | HEIGHT: 67 IN | RESPIRATION RATE: 18 BRPM | OXYGEN SATURATION: 96 % | TEMPERATURE: 98 F | DIASTOLIC BLOOD PRESSURE: 79 MMHG | OXYGEN SATURATION: 93 % | HEART RATE: 62 BPM | TEMPERATURE: 98.2 F | TEMPERATURE: 97 F | OXYGEN SATURATION: 99 %

## 2019-01-01 VITALS
OXYGEN SATURATION: 94 % | HEART RATE: 64 BPM | DIASTOLIC BLOOD PRESSURE: 74 MMHG | SYSTOLIC BLOOD PRESSURE: 134 MMHG | TEMPERATURE: 99.1 F | RESPIRATION RATE: 16 BRPM

## 2019-01-01 VITALS
TEMPERATURE: 98.2 F | DIASTOLIC BLOOD PRESSURE: 76 MMHG | HEART RATE: 76 BPM | OXYGEN SATURATION: 96 % | RESPIRATION RATE: 18 BRPM | SYSTOLIC BLOOD PRESSURE: 122 MMHG

## 2019-01-01 VITALS
DIASTOLIC BLOOD PRESSURE: 60 MMHG | RESPIRATION RATE: 16 BRPM | SYSTOLIC BLOOD PRESSURE: 116 MMHG | HEART RATE: 56 BPM | OXYGEN SATURATION: 96 % | TEMPERATURE: 98.5 F

## 2019-01-01 VITALS
SYSTOLIC BLOOD PRESSURE: 120 MMHG | RESPIRATION RATE: 19 BRPM | HEART RATE: 76 BPM | TEMPERATURE: 98.7 F | OXYGEN SATURATION: 97 % | DIASTOLIC BLOOD PRESSURE: 70 MMHG

## 2019-01-01 VITALS
TEMPERATURE: 97.9 F | DIASTOLIC BLOOD PRESSURE: 60 MMHG | OXYGEN SATURATION: 94 % | HEART RATE: 53 BPM | RESPIRATION RATE: 16 BRPM | SYSTOLIC BLOOD PRESSURE: 104 MMHG

## 2019-01-01 VITALS
RESPIRATION RATE: 18 BRPM | TEMPERATURE: 97.8 F | BODY MASS INDEX: 19.48 KG/M2 | WEIGHT: 124.4 LBS | HEART RATE: 63 BPM | SYSTOLIC BLOOD PRESSURE: 150 MMHG | OXYGEN SATURATION: 94 % | DIASTOLIC BLOOD PRESSURE: 82 MMHG

## 2019-01-01 VITALS
SYSTOLIC BLOOD PRESSURE: 122 MMHG | RESPIRATION RATE: 16 BRPM | OXYGEN SATURATION: 96 % | DIASTOLIC BLOOD PRESSURE: 68 MMHG | TEMPERATURE: 99.1 F | HEART RATE: 74 BPM

## 2019-01-01 VITALS
TEMPERATURE: 98.6 F | TEMPERATURE: 97.7 F | OXYGEN SATURATION: 97 % | RESPIRATION RATE: 14 BRPM | HEART RATE: 63 BPM | HEART RATE: 72 BPM | DIASTOLIC BLOOD PRESSURE: 68 MMHG | DIASTOLIC BLOOD PRESSURE: 70 MMHG | SYSTOLIC BLOOD PRESSURE: 110 MMHG | SYSTOLIC BLOOD PRESSURE: 122 MMHG | OXYGEN SATURATION: 95 %

## 2019-01-01 VITALS
RESPIRATION RATE: 18 BRPM | OXYGEN SATURATION: 95 % | TEMPERATURE: 99.1 F | SYSTOLIC BLOOD PRESSURE: 134 MMHG | DIASTOLIC BLOOD PRESSURE: 84 MMHG | HEART RATE: 78 BPM

## 2019-01-01 VITALS
HEART RATE: 66 BPM | OXYGEN SATURATION: 95 % | RESPIRATION RATE: 18 BRPM | DIASTOLIC BLOOD PRESSURE: 70 MMHG | TEMPERATURE: 98.8 F | SYSTOLIC BLOOD PRESSURE: 115 MMHG

## 2019-01-01 VITALS
SYSTOLIC BLOOD PRESSURE: 120 MMHG | DIASTOLIC BLOOD PRESSURE: 75 MMHG | HEART RATE: 63 BPM | RESPIRATION RATE: 16 BRPM | OXYGEN SATURATION: 98 %

## 2019-01-01 VITALS
SYSTOLIC BLOOD PRESSURE: 138 MMHG | DIASTOLIC BLOOD PRESSURE: 75 MMHG | HEART RATE: 64 BPM | TEMPERATURE: 97.6 F | BODY MASS INDEX: 19.27 KG/M2 | RESPIRATION RATE: 18 BRPM | WEIGHT: 122.8 LBS | HEIGHT: 67 IN

## 2019-01-01 VITALS
HEART RATE: 74 BPM | SYSTOLIC BLOOD PRESSURE: 130 MMHG | TEMPERATURE: 98.1 F | OXYGEN SATURATION: 94 % | DIASTOLIC BLOOD PRESSURE: 70 MMHG | RESPIRATION RATE: 18 BRPM

## 2019-01-01 VITALS
TEMPERATURE: 98.3 F | WEIGHT: 120.2 LBS | TEMPERATURE: 98.2 F | HEART RATE: 63 BPM | HEIGHT: 67 IN | DIASTOLIC BLOOD PRESSURE: 76 MMHG | OXYGEN SATURATION: 94 % | RESPIRATION RATE: 18 BRPM | SYSTOLIC BLOOD PRESSURE: 135 MMHG | OXYGEN SATURATION: 96 % | BODY MASS INDEX: 18.87 KG/M2 | HEART RATE: 65 BPM | RESPIRATION RATE: 18 BRPM | DIASTOLIC BLOOD PRESSURE: 84 MMHG | SYSTOLIC BLOOD PRESSURE: 138 MMHG

## 2019-01-01 VITALS
SYSTOLIC BLOOD PRESSURE: 118 MMHG | OXYGEN SATURATION: 95 % | RESPIRATION RATE: 16 BRPM | HEART RATE: 60 BPM | DIASTOLIC BLOOD PRESSURE: 64 MMHG | TEMPERATURE: 98.6 F

## 2019-01-01 VITALS
TEMPERATURE: 98.7 F | RESPIRATION RATE: 18 BRPM | SYSTOLIC BLOOD PRESSURE: 124 MMHG | OXYGEN SATURATION: 98 % | DIASTOLIC BLOOD PRESSURE: 72 MMHG | HEART RATE: 62 BPM

## 2019-01-01 VITALS
RESPIRATION RATE: 20 BRPM | SYSTOLIC BLOOD PRESSURE: 140 MMHG | TEMPERATURE: 98.6 F | DIASTOLIC BLOOD PRESSURE: 68 MMHG | HEART RATE: 68 BPM | OXYGEN SATURATION: 96 %

## 2019-01-01 VITALS
OXYGEN SATURATION: 98 % | SYSTOLIC BLOOD PRESSURE: 116 MMHG | DIASTOLIC BLOOD PRESSURE: 62 MMHG | TEMPERATURE: 98.2 F | RESPIRATION RATE: 18 BRPM | HEART RATE: 60 BPM

## 2019-01-01 VITALS
DIASTOLIC BLOOD PRESSURE: 66 MMHG | HEART RATE: 88 BPM | SYSTOLIC BLOOD PRESSURE: 118 MMHG | TEMPERATURE: 97.9 F | OXYGEN SATURATION: 96 %

## 2019-01-01 VITALS
TEMPERATURE: 97.8 F | HEIGHT: 67 IN | OXYGEN SATURATION: 95 % | BODY MASS INDEX: 19.15 KG/M2 | DIASTOLIC BLOOD PRESSURE: 90 MMHG | HEART RATE: 68 BPM | WEIGHT: 122 LBS | SYSTOLIC BLOOD PRESSURE: 170 MMHG | RESPIRATION RATE: 18 BRPM

## 2019-01-01 VITALS
HEART RATE: 60 BPM | RESPIRATION RATE: 18 BRPM | TEMPERATURE: 99.2 F | SYSTOLIC BLOOD PRESSURE: 122 MMHG | DIASTOLIC BLOOD PRESSURE: 84 MMHG | OXYGEN SATURATION: 98 %

## 2019-01-01 VITALS
DIASTOLIC BLOOD PRESSURE: 60 MMHG | OXYGEN SATURATION: 94 % | RESPIRATION RATE: 16 BRPM | SYSTOLIC BLOOD PRESSURE: 114 MMHG | TEMPERATURE: 97.9 F | HEART RATE: 54 BPM

## 2019-01-01 VITALS
DIASTOLIC BLOOD PRESSURE: 72 MMHG | HEART RATE: 80 BPM | RESPIRATION RATE: 18 BRPM | OXYGEN SATURATION: 96 % | TEMPERATURE: 97.9 F | SYSTOLIC BLOOD PRESSURE: 130 MMHG

## 2019-01-01 VITALS
SYSTOLIC BLOOD PRESSURE: 114 MMHG | OXYGEN SATURATION: 98 % | HEART RATE: 72 BPM | TEMPERATURE: 97.9 F | DIASTOLIC BLOOD PRESSURE: 74 MMHG | RESPIRATION RATE: 18 BRPM

## 2019-01-01 VITALS
SYSTOLIC BLOOD PRESSURE: 108 MMHG | DIASTOLIC BLOOD PRESSURE: 70 MMHG | OXYGEN SATURATION: 97 % | TEMPERATURE: 98.6 F | HEART RATE: 56 BPM | RESPIRATION RATE: 16 BRPM

## 2019-01-01 VITALS
OXYGEN SATURATION: 97 % | SYSTOLIC BLOOD PRESSURE: 130 MMHG | TEMPERATURE: 98.7 F | RESPIRATION RATE: 16 BRPM | HEART RATE: 63 BPM | DIASTOLIC BLOOD PRESSURE: 78 MMHG

## 2019-01-01 VITALS
SYSTOLIC BLOOD PRESSURE: 132 MMHG | RESPIRATION RATE: 18 BRPM | DIASTOLIC BLOOD PRESSURE: 80 MMHG | HEART RATE: 82 BPM | OXYGEN SATURATION: 97 % | TEMPERATURE: 97.8 F

## 2019-01-01 VITALS
TEMPERATURE: 98 F | RESPIRATION RATE: 14 BRPM | OXYGEN SATURATION: 98 % | SYSTOLIC BLOOD PRESSURE: 134 MMHG | HEART RATE: 78 BPM | DIASTOLIC BLOOD PRESSURE: 80 MMHG

## 2019-01-01 VITALS
OXYGEN SATURATION: 95 % | RESPIRATION RATE: 16 BRPM | TEMPERATURE: 98.6 F | DIASTOLIC BLOOD PRESSURE: 68 MMHG | SYSTOLIC BLOOD PRESSURE: 116 MMHG | HEART RATE: 66 BPM

## 2019-01-01 VITALS
DIASTOLIC BLOOD PRESSURE: 70 MMHG | SYSTOLIC BLOOD PRESSURE: 122 MMHG | HEART RATE: 65 BPM | RESPIRATION RATE: 18 BRPM | OXYGEN SATURATION: 96 % | TEMPERATURE: 97.3 F

## 2019-01-01 VITALS
DIASTOLIC BLOOD PRESSURE: 68 MMHG | HEART RATE: 84 BPM | RESPIRATION RATE: 16 BRPM | OXYGEN SATURATION: 97 % | TEMPERATURE: 98.2 F | SYSTOLIC BLOOD PRESSURE: 122 MMHG

## 2019-01-01 VITALS
DIASTOLIC BLOOD PRESSURE: 70 MMHG | TEMPERATURE: 97.9 F | SYSTOLIC BLOOD PRESSURE: 136 MMHG | RESPIRATION RATE: 18 BRPM | BODY MASS INDEX: 19.46 KG/M2 | HEART RATE: 62 BPM | OXYGEN SATURATION: 96 % | HEIGHT: 67 IN | WEIGHT: 124 LBS

## 2019-01-01 VITALS
DIASTOLIC BLOOD PRESSURE: 84 MMHG | OXYGEN SATURATION: 96 % | TEMPERATURE: 97.8 F | HEART RATE: 80 BPM | RESPIRATION RATE: 14 BRPM | SYSTOLIC BLOOD PRESSURE: 124 MMHG

## 2019-01-01 VITALS
OXYGEN SATURATION: 98 % | TEMPERATURE: 98.1 F | DIASTOLIC BLOOD PRESSURE: 66 MMHG | SYSTOLIC BLOOD PRESSURE: 120 MMHG | RESPIRATION RATE: 14 BRPM | HEART RATE: 6 BPM

## 2019-01-01 VITALS
TEMPERATURE: 98.6 F | OXYGEN SATURATION: 94 % | DIASTOLIC BLOOD PRESSURE: 74 MMHG | HEART RATE: 61 BPM | SYSTOLIC BLOOD PRESSURE: 132 MMHG

## 2019-01-01 VITALS
HEART RATE: 60 BPM | RESPIRATION RATE: 20 BRPM | OXYGEN SATURATION: 98 % | TEMPERATURE: 97.8 F | DIASTOLIC BLOOD PRESSURE: 70 MMHG | SYSTOLIC BLOOD PRESSURE: 126 MMHG

## 2019-01-01 VITALS
TEMPERATURE: 98.5 F | DIASTOLIC BLOOD PRESSURE: 70 MMHG | SYSTOLIC BLOOD PRESSURE: 108 MMHG | HEART RATE: 65 BPM | RESPIRATION RATE: 20 BRPM | OXYGEN SATURATION: 95 %

## 2019-01-01 VITALS
SYSTOLIC BLOOD PRESSURE: 122 MMHG | RESPIRATION RATE: 18 BRPM | TEMPERATURE: 97.8 F | DIASTOLIC BLOOD PRESSURE: 74 MMHG | OXYGEN SATURATION: 97 % | HEART RATE: 68 BPM

## 2019-01-01 VITALS
HEART RATE: 72 BPM | TEMPERATURE: 98.1 F | DIASTOLIC BLOOD PRESSURE: 72 MMHG | RESPIRATION RATE: 16 BRPM | OXYGEN SATURATION: 96 % | SYSTOLIC BLOOD PRESSURE: 118 MMHG

## 2019-01-01 VITALS
SYSTOLIC BLOOD PRESSURE: 138 MMHG | OXYGEN SATURATION: 98 % | RESPIRATION RATE: 16 BRPM | HEART RATE: 72 BPM | TEMPERATURE: 98.9 F | DIASTOLIC BLOOD PRESSURE: 72 MMHG

## 2019-01-01 VITALS
DIASTOLIC BLOOD PRESSURE: 66 MMHG | TEMPERATURE: 97.7 F | OXYGEN SATURATION: 92 % | HEART RATE: 61 BPM | RESPIRATION RATE: 16 BRPM | SYSTOLIC BLOOD PRESSURE: 112 MMHG

## 2019-01-01 VITALS
HEART RATE: 76 BPM | OXYGEN SATURATION: 97 % | DIASTOLIC BLOOD PRESSURE: 80 MMHG | SYSTOLIC BLOOD PRESSURE: 124 MMHG | RESPIRATION RATE: 18 BRPM | TEMPERATURE: 97.9 F

## 2019-01-01 VITALS
OXYGEN SATURATION: 94 % | DIASTOLIC BLOOD PRESSURE: 72 MMHG | SYSTOLIC BLOOD PRESSURE: 140 MMHG | RESPIRATION RATE: 18 BRPM | HEART RATE: 78 BPM | TEMPERATURE: 97.8 F

## 2019-01-01 VITALS
TEMPERATURE: 98.2 F | SYSTOLIC BLOOD PRESSURE: 122 MMHG | DIASTOLIC BLOOD PRESSURE: 76 MMHG | HEART RATE: 74 BPM | OXYGEN SATURATION: 99 % | RESPIRATION RATE: 16 BRPM

## 2019-01-01 VITALS
TEMPERATURE: 97.9 F | SYSTOLIC BLOOD PRESSURE: 110 MMHG | HEART RATE: 59 BPM | OXYGEN SATURATION: 98 % | DIASTOLIC BLOOD PRESSURE: 60 MMHG | RESPIRATION RATE: 19 BRPM

## 2019-01-01 VITALS
DIASTOLIC BLOOD PRESSURE: 78 MMHG | RESPIRATION RATE: 16 BRPM | SYSTOLIC BLOOD PRESSURE: 128 MMHG | TEMPERATURE: 98.5 F | HEART RATE: 59 BPM

## 2019-01-01 VITALS
TEMPERATURE: 97.9 F | DIASTOLIC BLOOD PRESSURE: 66 MMHG | SYSTOLIC BLOOD PRESSURE: 108 MMHG | OXYGEN SATURATION: 96 % | RESPIRATION RATE: 18 BRPM | HEART RATE: 82 BPM

## 2019-01-01 VITALS
RESPIRATION RATE: 20 BRPM | SYSTOLIC BLOOD PRESSURE: 110 MMHG | DIASTOLIC BLOOD PRESSURE: 60 MMHG | OXYGEN SATURATION: 97 % | TEMPERATURE: 97.8 F

## 2019-01-01 VITALS
RESPIRATION RATE: 18 BRPM | TEMPERATURE: 98.5 F | DIASTOLIC BLOOD PRESSURE: 80 MMHG | HEART RATE: 62 BPM | SYSTOLIC BLOOD PRESSURE: 120 MMHG | OXYGEN SATURATION: 95 %

## 2019-01-01 VITALS
DIASTOLIC BLOOD PRESSURE: 72 MMHG | OXYGEN SATURATION: 97 % | SYSTOLIC BLOOD PRESSURE: 118 MMHG | HEART RATE: 72 BPM | TEMPERATURE: 97.6 F | RESPIRATION RATE: 18 BRPM

## 2019-01-01 VITALS
HEART RATE: 71 BPM | SYSTOLIC BLOOD PRESSURE: 124 MMHG | RESPIRATION RATE: 16 BRPM | TEMPERATURE: 98.7 F | OXYGEN SATURATION: 94 % | DIASTOLIC BLOOD PRESSURE: 76 MMHG

## 2019-01-01 VITALS
RESPIRATION RATE: 17 BRPM | OXYGEN SATURATION: 97 % | SYSTOLIC BLOOD PRESSURE: 120 MMHG | HEART RATE: 72 BPM | DIASTOLIC BLOOD PRESSURE: 68 MMHG | TEMPERATURE: 97.4 F

## 2019-01-01 PROCEDURE — 3331090001 HH PPS REVENUE CREDIT

## 2019-01-01 PROCEDURE — G0299 HHS/HOSPICE OF RN EA 15 MIN: HCPCS

## 2019-01-01 PROCEDURE — 3331090002 HH PPS REVENUE DEBIT

## 2019-01-01 PROCEDURE — 99213 OFFICE O/P EST LOW 20 MIN: CPT

## 2019-01-01 PROCEDURE — 400013 HH SOC

## 2019-01-01 PROCEDURE — 400014 HH F/U

## 2019-01-01 PROCEDURE — A6021 COLLAGEN DRESSING <=16 SQ IN: HCPCS

## 2019-01-01 PROCEDURE — A6212 FOAM DRG <=16 SQ IN W/BORDER: HCPCS

## 2019-01-01 PROCEDURE — A4216 STERILE WATER/SALINE, 10 ML: HCPCS

## 2019-01-01 PROCEDURE — 99212 OFFICE O/P EST SF 10 MIN: CPT

## 2019-01-01 PROCEDURE — A6445 CONFORM BAND S W <3"/YD: HCPCS

## 2019-01-01 PROCEDURE — A4450 NON-WATERPROOF TAPE: HCPCS

## 2019-01-01 PROCEDURE — A6260 WOUND CLEANSER ANY TYPE/SIZE: HCPCS

## 2019-01-01 PROCEDURE — A6216 NON-STERILE GAUZE<=16 SQ IN: HCPCS

## 2019-01-01 PROCEDURE — 99215 OFFICE O/P EST HI 40 MIN: CPT

## 2019-01-01 PROCEDURE — 99214 OFFICE O/P EST MOD 30 MIN: CPT

## 2019-01-01 PROCEDURE — A6255 ABSORPT DRG >16<=48 IN W/BDR: HCPCS

## 2019-01-01 PROCEDURE — A6446 CONFORM BAND S W>=3" <5"/YD: HCPCS

## 2019-01-02 ENCOUNTER — HOME CARE VISIT (OUTPATIENT)
Dept: SCHEDULING | Facility: HOME HEALTH | Age: 84
End: 2019-01-02
Payer: COMMERCIAL

## 2019-01-02 VITALS
TEMPERATURE: 98.1 F | OXYGEN SATURATION: 98 % | RESPIRATION RATE: 18 BRPM | SYSTOLIC BLOOD PRESSURE: 118 MMHG | HEART RATE: 69 BPM | DIASTOLIC BLOOD PRESSURE: 64 MMHG

## 2019-01-02 PROCEDURE — 3331090001 HH PPS REVENUE CREDIT

## 2019-01-02 PROCEDURE — G0299 HHS/HOSPICE OF RN EA 15 MIN: HCPCS

## 2019-01-02 PROCEDURE — 3331090002 HH PPS REVENUE DEBIT

## 2019-01-03 PROCEDURE — 3331090001 HH PPS REVENUE CREDIT

## 2019-01-03 PROCEDURE — 3331090002 HH PPS REVENUE DEBIT

## 2019-01-04 ENCOUNTER — HOME CARE VISIT (OUTPATIENT)
Dept: SCHEDULING | Facility: HOME HEALTH | Age: 84
End: 2019-01-04
Payer: COMMERCIAL

## 2019-01-04 VITALS
TEMPERATURE: 98.2 F | RESPIRATION RATE: 18 BRPM | HEART RATE: 67 BPM | DIASTOLIC BLOOD PRESSURE: 56 MMHG | SYSTOLIC BLOOD PRESSURE: 116 MMHG | OXYGEN SATURATION: 99 %

## 2019-01-04 PROCEDURE — 3331090002 HH PPS REVENUE DEBIT

## 2019-01-04 PROCEDURE — 3331090001 HH PPS REVENUE CREDIT

## 2019-01-04 PROCEDURE — G0299 HHS/HOSPICE OF RN EA 15 MIN: HCPCS

## 2019-01-04 PROCEDURE — 3331090003 HH PPS REVENUE ADJ

## 2019-01-05 PROCEDURE — 3331090002 HH PPS REVENUE DEBIT

## 2019-01-05 PROCEDURE — 3331090001 HH PPS REVENUE CREDIT

## 2019-01-06 PROCEDURE — 3331090001 HH PPS REVENUE CREDIT

## 2019-01-06 PROCEDURE — 3331090002 HH PPS REVENUE DEBIT

## 2019-01-07 ENCOUNTER — HOME CARE VISIT (OUTPATIENT)
Dept: SCHEDULING | Facility: HOME HEALTH | Age: 84
End: 2019-01-07
Payer: COMMERCIAL

## 2019-01-07 PROCEDURE — G0299 HHS/HOSPICE OF RN EA 15 MIN: HCPCS

## 2019-01-07 PROCEDURE — 3331090002 HH PPS REVENUE DEBIT

## 2019-01-07 PROCEDURE — 3331090001 HH PPS REVENUE CREDIT

## 2019-01-07 PROCEDURE — 400014 HH F/U

## 2019-01-08 VITALS
SYSTOLIC BLOOD PRESSURE: 132 MMHG | TEMPERATURE: 97.5 F | HEART RATE: 69 BPM | DIASTOLIC BLOOD PRESSURE: 76 MMHG | RESPIRATION RATE: 18 BRPM | OXYGEN SATURATION: 93 %

## 2019-01-08 PROCEDURE — 3331090001 HH PPS REVENUE CREDIT

## 2019-01-08 PROCEDURE — 3331090002 HH PPS REVENUE DEBIT

## 2019-01-09 ENCOUNTER — HOME CARE VISIT (OUTPATIENT)
Dept: SCHEDULING | Facility: HOME HEALTH | Age: 84
End: 2019-01-09
Payer: COMMERCIAL

## 2019-01-09 VITALS
HEART RATE: 67 BPM | SYSTOLIC BLOOD PRESSURE: 126 MMHG | TEMPERATURE: 98.2 F | OXYGEN SATURATION: 98 % | RESPIRATION RATE: 18 BRPM | DIASTOLIC BLOOD PRESSURE: 64 MMHG

## 2019-01-09 PROCEDURE — G0299 HHS/HOSPICE OF RN EA 15 MIN: HCPCS

## 2019-01-09 PROCEDURE — 3331090001 HH PPS REVENUE CREDIT

## 2019-01-09 PROCEDURE — 3331090002 HH PPS REVENUE DEBIT

## 2019-01-10 PROCEDURE — 3331090001 HH PPS REVENUE CREDIT

## 2019-01-10 PROCEDURE — 3331090002 HH PPS REVENUE DEBIT

## 2019-01-11 ENCOUNTER — HOME CARE VISIT (OUTPATIENT)
Dept: SCHEDULING | Facility: HOME HEALTH | Age: 84
End: 2019-01-11
Payer: COMMERCIAL

## 2019-01-11 VITALS
HEART RATE: 88 BPM | OXYGEN SATURATION: 98 % | SYSTOLIC BLOOD PRESSURE: 122 MMHG | TEMPERATURE: 98.3 F | RESPIRATION RATE: 18 BRPM | DIASTOLIC BLOOD PRESSURE: 64 MMHG

## 2019-01-11 PROCEDURE — 3331090001 HH PPS REVENUE CREDIT

## 2019-01-11 PROCEDURE — G0299 HHS/HOSPICE OF RN EA 15 MIN: HCPCS

## 2019-01-11 PROCEDURE — 3331090002 HH PPS REVENUE DEBIT

## 2019-01-12 PROCEDURE — 3331090002 HH PPS REVENUE DEBIT

## 2019-01-12 PROCEDURE — 3331090001 HH PPS REVENUE CREDIT

## 2019-01-13 PROCEDURE — 3331090002 HH PPS REVENUE DEBIT

## 2019-01-13 PROCEDURE — 3331090001 HH PPS REVENUE CREDIT

## 2019-01-14 ENCOUNTER — HOME CARE VISIT (OUTPATIENT)
Dept: SCHEDULING | Facility: HOME HEALTH | Age: 84
End: 2019-01-14
Payer: COMMERCIAL

## 2019-01-14 PROCEDURE — 3331090001 HH PPS REVENUE CREDIT

## 2019-01-14 PROCEDURE — 3331090002 HH PPS REVENUE DEBIT

## 2019-01-14 PROCEDURE — G0299 HHS/HOSPICE OF RN EA 15 MIN: HCPCS

## 2019-01-15 VITALS
TEMPERATURE: 98.2 F | DIASTOLIC BLOOD PRESSURE: 60 MMHG | RESPIRATION RATE: 18 BRPM | HEART RATE: 76 BPM | SYSTOLIC BLOOD PRESSURE: 122 MMHG | OXYGEN SATURATION: 98 %

## 2019-01-15 PROCEDURE — 3331090002 HH PPS REVENUE DEBIT

## 2019-01-15 PROCEDURE — 3331090001 HH PPS REVENUE CREDIT

## 2019-01-16 ENCOUNTER — HOME CARE VISIT (OUTPATIENT)
Dept: SCHEDULING | Facility: HOME HEALTH | Age: 84
End: 2019-01-16
Payer: COMMERCIAL

## 2019-01-16 VITALS
SYSTOLIC BLOOD PRESSURE: 130 MMHG | OXYGEN SATURATION: 94 % | RESPIRATION RATE: 18 BRPM | TEMPERATURE: 97.9 F | DIASTOLIC BLOOD PRESSURE: 78 MMHG | HEART RATE: 64 BPM

## 2019-01-16 PROCEDURE — 3331090002 HH PPS REVENUE DEBIT

## 2019-01-16 PROCEDURE — G0299 HHS/HOSPICE OF RN EA 15 MIN: HCPCS

## 2019-01-16 PROCEDURE — 3331090001 HH PPS REVENUE CREDIT

## 2019-01-17 PROCEDURE — 3331090001 HH PPS REVENUE CREDIT

## 2019-01-17 PROCEDURE — 3331090002 HH PPS REVENUE DEBIT

## 2019-01-18 ENCOUNTER — HOME CARE VISIT (OUTPATIENT)
Dept: SCHEDULING | Facility: HOME HEALTH | Age: 84
End: 2019-01-18
Payer: COMMERCIAL

## 2019-01-18 ENCOUNTER — HOME CARE VISIT (OUTPATIENT)
Dept: HOME HEALTH SERVICES | Facility: HOME HEALTH | Age: 84
End: 2019-01-18
Payer: COMMERCIAL

## 2019-01-18 VITALS
OXYGEN SATURATION: 97 % | HEART RATE: 60 BPM | RESPIRATION RATE: 18 BRPM | DIASTOLIC BLOOD PRESSURE: 62 MMHG | SYSTOLIC BLOOD PRESSURE: 122 MMHG | TEMPERATURE: 98 F

## 2019-01-18 PROCEDURE — 3331090001 HH PPS REVENUE CREDIT

## 2019-01-18 PROCEDURE — G0299 HHS/HOSPICE OF RN EA 15 MIN: HCPCS

## 2019-01-18 PROCEDURE — 3331090002 HH PPS REVENUE DEBIT

## 2019-01-19 PROCEDURE — 3331090002 HH PPS REVENUE DEBIT

## 2019-01-19 PROCEDURE — 3331090001 HH PPS REVENUE CREDIT

## 2019-01-20 ENCOUNTER — HOME CARE VISIT (OUTPATIENT)
Dept: SCHEDULING | Facility: HOME HEALTH | Age: 84
End: 2019-01-20
Payer: COMMERCIAL

## 2019-01-20 VITALS
DIASTOLIC BLOOD PRESSURE: 56 MMHG | HEART RATE: 78 BPM | RESPIRATION RATE: 18 BRPM | SYSTOLIC BLOOD PRESSURE: 116 MMHG | TEMPERATURE: 98.3 F | OXYGEN SATURATION: 98 %

## 2019-01-20 PROCEDURE — 3331090001 HH PPS REVENUE CREDIT

## 2019-01-20 PROCEDURE — 3331090002 HH PPS REVENUE DEBIT

## 2019-01-20 PROCEDURE — G0299 HHS/HOSPICE OF RN EA 15 MIN: HCPCS

## 2019-01-21 PROCEDURE — 3331090001 HH PPS REVENUE CREDIT

## 2019-01-21 PROCEDURE — 3331090002 HH PPS REVENUE DEBIT

## 2019-01-22 PROBLEM — S81.801A WOUND OF RIGHT LEG: Status: ACTIVE | Noted: 2019-01-22

## 2019-01-22 PROCEDURE — 3331090002 HH PPS REVENUE DEBIT

## 2019-01-22 PROCEDURE — 3331090001 HH PPS REVENUE CREDIT

## 2019-01-23 ENCOUNTER — HOME CARE VISIT (OUTPATIENT)
Dept: SCHEDULING | Facility: HOME HEALTH | Age: 84
End: 2019-01-23
Payer: COMMERCIAL

## 2019-01-23 VITALS
DIASTOLIC BLOOD PRESSURE: 78 MMHG | HEART RATE: 80 BPM | TEMPERATURE: 98 F | OXYGEN SATURATION: 97 % | SYSTOLIC BLOOD PRESSURE: 122 MMHG | RESPIRATION RATE: 20 BRPM

## 2019-01-23 PROCEDURE — 3331090001 HH PPS REVENUE CREDIT

## 2019-01-23 PROCEDURE — 3331090002 HH PPS REVENUE DEBIT

## 2019-01-23 PROCEDURE — A6446 CONFORM BAND S W>=3" <5"/YD: HCPCS

## 2019-01-23 PROCEDURE — G0299 HHS/HOSPICE OF RN EA 15 MIN: HCPCS

## 2019-01-24 PROCEDURE — 3331090001 HH PPS REVENUE CREDIT

## 2019-01-24 PROCEDURE — 3331090002 HH PPS REVENUE DEBIT

## 2019-01-25 ENCOUNTER — HOME CARE VISIT (OUTPATIENT)
Dept: SCHEDULING | Facility: HOME HEALTH | Age: 84
End: 2019-01-25
Payer: COMMERCIAL

## 2019-01-25 PROCEDURE — G0299 HHS/HOSPICE OF RN EA 15 MIN: HCPCS

## 2019-01-25 PROCEDURE — 3331090002 HH PPS REVENUE DEBIT

## 2019-01-25 PROCEDURE — 3331090001 HH PPS REVENUE CREDIT

## 2019-01-26 PROCEDURE — 3331090002 HH PPS REVENUE DEBIT

## 2019-01-26 PROCEDURE — 3331090001 HH PPS REVENUE CREDIT

## 2019-01-27 PROCEDURE — 3331090002 HH PPS REVENUE DEBIT

## 2019-01-27 PROCEDURE — 3331090001 HH PPS REVENUE CREDIT

## 2019-01-28 ENCOUNTER — HOME CARE VISIT (OUTPATIENT)
Dept: SCHEDULING | Facility: HOME HEALTH | Age: 84
End: 2019-01-28
Payer: COMMERCIAL

## 2019-01-28 ENCOUNTER — HOME CARE VISIT (OUTPATIENT)
Dept: HOME HEALTH SERVICES | Facility: HOME HEALTH | Age: 84
End: 2019-01-28
Payer: COMMERCIAL

## 2019-01-28 VITALS
SYSTOLIC BLOOD PRESSURE: 118 MMHG | TEMPERATURE: 97.4 F | OXYGEN SATURATION: 97 % | HEART RATE: 70 BPM | DIASTOLIC BLOOD PRESSURE: 70 MMHG | RESPIRATION RATE: 17 BRPM

## 2019-01-28 PROCEDURE — G0299 HHS/HOSPICE OF RN EA 15 MIN: HCPCS

## 2019-01-28 PROCEDURE — 3331090002 HH PPS REVENUE DEBIT

## 2019-01-28 PROCEDURE — 3331090001 HH PPS REVENUE CREDIT

## 2019-01-29 ENCOUNTER — APPOINTMENT (OUTPATIENT)
Dept: WOUND CARE | Age: 84
End: 2019-01-29
Attending: PHYSICAL MEDICINE & REHABILITATION

## 2019-01-29 VITALS
RESPIRATION RATE: 18 BRPM | HEART RATE: 82 BPM | DIASTOLIC BLOOD PRESSURE: 66 MMHG | TEMPERATURE: 97.9 F | OXYGEN SATURATION: 99 % | SYSTOLIC BLOOD PRESSURE: 122 MMHG

## 2019-01-29 PROCEDURE — 3331090002 HH PPS REVENUE DEBIT

## 2019-01-29 PROCEDURE — 3331090001 HH PPS REVENUE CREDIT

## 2019-01-30 ENCOUNTER — HOME CARE VISIT (OUTPATIENT)
Dept: SCHEDULING | Facility: HOME HEALTH | Age: 84
End: 2019-01-30
Payer: COMMERCIAL

## 2019-01-30 PROCEDURE — 3331090002 HH PPS REVENUE DEBIT

## 2019-01-30 PROCEDURE — 3331090001 HH PPS REVENUE CREDIT

## 2019-01-30 PROCEDURE — G0299 HHS/HOSPICE OF RN EA 15 MIN: HCPCS

## 2019-01-31 VITALS
RESPIRATION RATE: 18 BRPM | OXYGEN SATURATION: 98 % | SYSTOLIC BLOOD PRESSURE: 116 MMHG | TEMPERATURE: 98.2 F | HEART RATE: 74 BPM | DIASTOLIC BLOOD PRESSURE: 64 MMHG

## 2019-01-31 PROCEDURE — 3331090002 HH PPS REVENUE DEBIT

## 2019-01-31 PROCEDURE — 3331090001 HH PPS REVENUE CREDIT

## 2019-02-01 ENCOUNTER — HOSPITAL ENCOUNTER (OUTPATIENT)
Dept: WOUND CARE | Age: 84
Discharge: HOME | End: 2019-02-01
Attending: PHYSICAL MEDICINE & REHABILITATION
Payer: COMMERCIAL

## 2019-02-01 ENCOUNTER — HOME CARE VISIT (OUTPATIENT)
Dept: SCHEDULING | Facility: HOME HEALTH | Age: 84
End: 2019-02-01
Payer: COMMERCIAL

## 2019-02-01 VITALS
DIASTOLIC BLOOD PRESSURE: 84 MMHG | HEART RATE: 67 BPM | RESPIRATION RATE: 18 BRPM | OXYGEN SATURATION: 96 % | TEMPERATURE: 98.2 F | SYSTOLIC BLOOD PRESSURE: 147 MMHG

## 2019-02-01 PROCEDURE — 3331090002 HH PPS REVENUE DEBIT

## 2019-02-01 PROCEDURE — 99213 OFFICE O/P EST LOW 20 MIN: CPT

## 2019-02-01 PROCEDURE — 3331090001 HH PPS REVENUE CREDIT

## 2019-02-01 NOTE — DISCHARGE INSTRUCTIONS
-Cleanse wound with normal saline.  -May shower prior to home health coming to home to change dressing.  -Apply Hydrofera Ready to wound bed, (apply spongy side down and shiny side facing out) cover with bordered foam. Change 3 times a week by [facility].

## 2019-02-01 NOTE — WOUND CARE
[address] [place] [address], [facility] [address] Phone: [phone] Fax: [phone] Patient: Mu Miranda MRN: [id number]  SSN: [id number] YOB: 1923  Age: 80 y.o. Sex: female Return Appointment: 3 weeks with Syed Pérez MD 
 
Instructions:  
-Cleanse wound with normal saline. 
-May shower prior to home health coming to home to change dressing. 
-Apply Hydrofera Ready to wound bed, (apply spongy side down and shiny side facing out) cover with bordered foam. Change 3 times a week by [facility]. Should you experience increased redness, swelling, pain, foul odor, size of wound(s), or have a temperature over 101 degrees please contact the [address] [place] [address] Road at [phone] or if after hours contact your primary care physician or go to the hospital emergency department. Signed By: Marixa Aponte RN February 1, 2019

## 2019-02-01 NOTE — WOUND CARE
02/01/19 1451 Wound Ankle Right;Lateral  
Date First Assessed/Time First Assessed: 02/01/19 1450   Wound Type: Venous  Location: Ankle  Orientation: Right;Lateral  
Dressing Type  Open to air Non-Pressure Injury Full thickness (subcut/muscle) Wound Length (cm) 1.3 cm Wound Width (cm) 1 cm Wound Depth (cm) 0.1 Wound Surface area (cm^2) 1.3 cm^2 Condition of Base Slough;Granulation Tissue Type Red;Yellow Tissue Type Percent Pink 95 Tissue Type Percent Yellow 5 Drainage Amount  Small Drainage Color Serosanguinous Wound Odor None Periwound Skin Condition Intact Cleansing and Cleansing Agents  Soap and water;Normal saline **PATIENT TAKING AN ANTICOAGULANT ASPIRIN 81MG DAILY. **

## 2019-02-02 PROCEDURE — 3331090001 HH PPS REVENUE CREDIT

## 2019-02-02 PROCEDURE — 3331090002 HH PPS REVENUE DEBIT

## 2019-02-03 PROCEDURE — 3331090002 HH PPS REVENUE DEBIT

## 2019-02-03 PROCEDURE — 3331090001 HH PPS REVENUE CREDIT

## 2019-02-04 ENCOUNTER — HOME CARE VISIT (OUTPATIENT)
Dept: SCHEDULING | Facility: HOME HEALTH | Age: 84
End: 2019-02-04
Payer: COMMERCIAL

## 2019-02-04 VITALS
SYSTOLIC BLOOD PRESSURE: 122 MMHG | DIASTOLIC BLOOD PRESSURE: 64 MMHG | OXYGEN SATURATION: 98 % | HEART RATE: 89 BPM | TEMPERATURE: 98.1 F | RESPIRATION RATE: 18 BRPM

## 2019-02-04 PROCEDURE — 3331090002 HH PPS REVENUE DEBIT

## 2019-02-04 PROCEDURE — G0299 HHS/HOSPICE OF RN EA 15 MIN: HCPCS

## 2019-02-04 PROCEDURE — 3331090001 HH PPS REVENUE CREDIT

## 2019-02-05 PROCEDURE — 3331090002 HH PPS REVENUE DEBIT

## 2019-02-05 PROCEDURE — 3331090001 HH PPS REVENUE CREDIT

## 2019-02-06 ENCOUNTER — HOME CARE VISIT (OUTPATIENT)
Dept: SCHEDULING | Facility: HOME HEALTH | Age: 84
End: 2019-02-06
Payer: COMMERCIAL

## 2019-02-06 VITALS
HEART RATE: 77 BPM | OXYGEN SATURATION: 98 % | TEMPERATURE: 98.2 F | SYSTOLIC BLOOD PRESSURE: 116 MMHG | DIASTOLIC BLOOD PRESSURE: 62 MMHG | RESPIRATION RATE: 18 BRPM

## 2019-02-06 PROCEDURE — G0299 HHS/HOSPICE OF RN EA 15 MIN: HCPCS

## 2019-02-06 PROCEDURE — A6212 FOAM DRG <=16 SQ IN W/BORDER: HCPCS

## 2019-02-06 PROCEDURE — 3331090001 HH PPS REVENUE CREDIT

## 2019-02-06 PROCEDURE — 3331090002 HH PPS REVENUE DEBIT

## 2019-02-07 PROCEDURE — 3331090001 HH PPS REVENUE CREDIT

## 2019-02-07 PROCEDURE — 3331090002 HH PPS REVENUE DEBIT

## 2019-02-08 ENCOUNTER — HOME CARE VISIT (OUTPATIENT)
Dept: SCHEDULING | Facility: HOME HEALTH | Age: 84
End: 2019-02-08
Payer: COMMERCIAL

## 2019-02-08 PROCEDURE — 3331090002 HH PPS REVENUE DEBIT

## 2019-02-08 PROCEDURE — G0299 HHS/HOSPICE OF RN EA 15 MIN: HCPCS

## 2019-02-08 PROCEDURE — 3331090001 HH PPS REVENUE CREDIT

## 2019-02-09 PROCEDURE — 3331090001 HH PPS REVENUE CREDIT

## 2019-02-09 PROCEDURE — 3331090002 HH PPS REVENUE DEBIT

## 2019-02-10 VITALS
DIASTOLIC BLOOD PRESSURE: 64 MMHG | TEMPERATURE: 97.9 F | OXYGEN SATURATION: 98 % | SYSTOLIC BLOOD PRESSURE: 124 MMHG | RESPIRATION RATE: 18 BRPM | HEART RATE: 74 BPM

## 2019-02-10 PROCEDURE — 3331090002 HH PPS REVENUE DEBIT

## 2019-02-10 PROCEDURE — 3331090001 HH PPS REVENUE CREDIT

## 2019-02-11 ENCOUNTER — HOME CARE VISIT (OUTPATIENT)
Dept: SCHEDULING | Facility: HOME HEALTH | Age: 84
End: 2019-02-11
Payer: COMMERCIAL

## 2019-02-11 PROCEDURE — 3331090003 HH PPS REVENUE ADJ

## 2019-02-11 PROCEDURE — 3331090001 HH PPS REVENUE CREDIT

## 2019-02-11 PROCEDURE — G0299 HHS/HOSPICE OF RN EA 15 MIN: HCPCS

## 2019-02-11 PROCEDURE — 3331090002 HH PPS REVENUE DEBIT

## 2019-02-22 ENCOUNTER — HOME HEALTH ADMISSION (OUTPATIENT)
Dept: HOME HEALTH SERVICES | Facility: HOME HEALTH | Age: 84
End: 2019-02-22
Payer: COMMERCIAL

## 2019-02-22 ENCOUNTER — HOSPITAL ENCOUNTER (OUTPATIENT)
Dept: WOUND CARE | Age: 84
Discharge: HOME | End: 2019-02-22
Attending: PHYSICAL MEDICINE & REHABILITATION
Payer: COMMERCIAL

## 2019-02-22 VITALS
TEMPERATURE: 97.5 F | RESPIRATION RATE: 18 BRPM | OXYGEN SATURATION: 95 % | WEIGHT: 120.4 LBS | HEART RATE: 76 BPM | DIASTOLIC BLOOD PRESSURE: 81 MMHG | BODY MASS INDEX: 18.86 KG/M2 | SYSTOLIC BLOOD PRESSURE: 152 MMHG

## 2019-02-22 DIAGNOSIS — S81.801D WOUND OF RIGHT LOWER EXTREMITY, SUBSEQUENT ENCOUNTER: Primary | ICD-10-CM

## 2019-02-22 PROCEDURE — 99214 OFFICE O/P EST MOD 30 MIN: CPT

## 2019-02-22 NOTE — WOUND CARE
[address] [place] [address] [place], [address] Phone: [phone] Fax: [phone] Patient: Ambreen Garcia MRN: [id number]  SSN: [id number] YOB: 1923  Age: 80 y.o. Sex: female Return Appointment: 3 weeks with Pedro Crocker MD 
 
Instructions:  
-Cleanse wound with normal saline. 
-May shower prior to home health coming to home to change dressing. 
-Apply Endoform-Cut to wound size and apply to wound bed, then cover Endoform with Hydrofera Ready, (apply spongy side down and shiny side facing out) cover with Covrsite dressing. Change 3 times a week by [facility]. -MD to place new referral to home health. Should you experience increased redness, swelling, pain, foul odor, size of wound(s), or have a temperature over 101 degrees please contact the [address] [place] [address] Road at [phone] or if after hours contact your primary care physician or go to the hospital emergency department. Signed By: Lorena Rodriguez RN February 22, 2019

## 2019-02-22 NOTE — DISCHARGE INSTRUCTIONS
-Cleanse wound with normal saline.  -May shower prior to home health coming to home to change dressing.  -Apply Endoform-Cut to wound size and apply to wound bed, then cover Endoform with Hydrofera Ready, (apply spongy side down and shiny side facing out) cover with Covrsite dressing. Change 3 times a week by [facility]. -MD to place new referral to home health.

## 2019-02-22 NOTE — PROGRESS NOTES
Wound Center Progress Note Patient: Elmer Ochoa MRN: [id number]  SSN: [id number] YOB: 1923  Age: 80 y.o. Sex: female Subjective: Chief Complaint: 
R ankle wound History of Present Illness:    
 
Wound Caused By: Vedia Console Associated Signs and Symptoms: drainage, infection Timing: constant Quality: wound Severity: full thickness Modifying Factors: age, overall infirmity Has been on Bax. Has HH assisting. Family very attentive but live in [place]. Past Medical History:  
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  
 Skin cancer of scalp 2012  TIA (transient ischemic attack)  Unspecified hypothyroidism 10/31/2012 Past Surgical History:  
Procedure Laterality Date  HX APPENDECTOMY  HX COLONOSCOPY    
 HX MALIGNANT SKIN LESION EXCISION    
 scalp  RECTAL SENSATION TEST, BALLOON Family History Problem Relation Age of Onset  Hypertension Mother  Heart Disease Father  Breast Cancer Neg Hx Social History Tobacco Use  Smoking status: Never Smoker  Smokeless tobacco: Never Used Substance Use Topics  Alcohol use: No  
   
Prior to Admission medications Medication Sig Start Date End Date Taking?  Authorizing Provider  
traMADol (ULTRAM) 50 mg tablet Take 1 Tab by mouth every eight (8) hours as needed for Pain. Max Daily Amount: 150 mg. 12/27/18   Louise Fletcher MD  
lubiPROStone [facility]) 8 mcg capsule Take 1 Cap by mouth daily (with breakfast). 6/7/18   Louise Fletcher MD  
fluticasone [facility]) 50 mcg/actuation nasal spray USE 2 SPRAYS IN [facility] NOSTRIL ONCE DAILY 6/7/18   Louise Fletcher MD  
enalapril (VASOTEC) 10 mg tablet Take 1 Tab by mouth two (2) times a day. 6/7/18   Louise Fletcher MD  
simvastatin (ZOCOR) 40 mg tablet Take 1 Tab by mouth nightly. 6/7/18   Louise Fletcher MD  
levothyroxine (SYNTHROID) 50 mcg tablet Take 1 Tab by mouth daily. 3/20/18   Rachel Hicks MD  
lidocaine (LIDODERM) 5 % 1 Patch by TransDERmal route every twenty-four (24) hours. Apply patch to the affected area for 12 hours a day and remove for 12 hours a day. 2/15/17   Louise Fletcher MD  
simethicone 125 mg chewable tablet Take 125 mg by mouth every six (6) hours as needed for Flatulence. 1/30/17   Amanda Ryder MD  
brimonidine (ALPHAGAN P) 0.1 % ophthalmic solution Administer 1 Drop to right eye every eight (8) hours. Provider, Historical  
esomeprazole (NEXIUM) 40 mg capsule Take  by mouth daily. Provider, Historical  
brimonidine (ALPHAGAN) 0.15 % ophthalmic solution Administer 1 Drop to right eye three (3) times daily. Provider, Historical  
VIT C/E/ZN/COPPR/LUTEIN/ZEAXAN (OCUVITE LUTEIN & ZEAXANTHIN PO) Take  by mouth. Provider, Historical  
acetaminophen (TYLENOL) 500 mg tablet Take  by mouth every six (6) hours as needed for Pain. Provider, Historical  
clobetasol (TEMOVATE) 0.05 % external solution Apply 1 Drop to affected area two (2) times a day. 4/1/14   Provider, Historical  
dorzolamide-timolol (COSOPT) 2-0.5 % ophthalmic solution Administer 1 Drop to both eyes two (2) times a day. 2/2/14   Provider, Historical  
ketoconazole (NIZORAL) 2 % shampoo Apply 1 mL to affected area daily.  4/1/14   Provider, Historical  
 TRAVATAN Z 0.004 % ophthalmic solution Administer 1 Drop to both eyes nightly. 2/2/14   Provider, Historical  
aspirin 81 mg tablet Take 81 mg by mouth daily. Provider, Historical  
calcium-cholecalciferol, d3, (CALCIUM 600 + D) 600-125 mg-unit Tab Take 1 Tab by mouth two (2) times a day. Provider, Historical  
polyethylene glycol (MIRALAX) 17 gram packet Take 17 g by mouth daily. Provider, Historical  
Cholecalciferol, Vitamin D3, (VITAMIN D3) 1,000 unit cap Take 1 Tab by mouth daily. Provider, Historical  
 
No Known Allergies Review of Systems: 
CONSTITUTIONAL: No fever, chills HEAD: No headache EYES: No visual loss ENT: No hearing loss SKIN: No rash CARDIOVASCULAR: No chest pain RESPIRATORY: No shortness of breath GASTROINTESTINAL: No nausea, vomiting GENITOURINARY: No excessive urination NEUROLOGICAL: No weakness MUSCULOSKELETAL: No muscle pain. No neck pain HEMATOLOGIC: No easy bleeding LYMPHATICS: No lymphedema. PSYCHIATRIC: No current depression ENDOCRINOLOGIC: No high sugars ALLERGIES: No history of asthma, hives, eczema or rhinitis. No results found for: HBA1C, POE7JLYC, HGBE8, HEM0HPQE, UGZ4AQIJ Immunization History Administered Date(s) Administered  Influenza High Dose Vaccine PF 10/12/2016, 11/29/2017, 12/05/2018  Influenza Vaccine 10/17/2013, 11/12/2014  Influenza Vaccine PF 09/10/2015  Influenza Vaccine Whole 09/06/2012  Pneumococcal Conjugate (PCV-13) 02/11/2015  Pneumococcal Polysaccharide (PPSV-23) 01/01/1993  TDAP Vaccine 05/31/2012  Tdap 05/31/2012  Zoster Vaccine, Live 11/13/2014 There is no height or weight on file to calculate BMI. Current medications: 
Current Outpatient Medications Medication Sig Dispense Refill  traMADol (ULTRAM) 50 mg tablet Take 1 Tab by mouth every eight (8) hours as needed for Pain.  Max Daily Amount: 150 mg. 30 Tab 0  
  lubiPROStone (AMITIZA) 8 mcg capsule Take 1 Cap by mouth daily (with breakfast). 90 Cap 3  
 fluticasone (FLONASE) 50 mcg/actuation nasal spray USE 2 SPRAYS IN EACH NOSTRIL ONCE DAILY 3 Bottle 3  
 enalapril (VASOTEC) 10 mg tablet Take 1 Tab by mouth two (2) times a day. 180 Tab 3  
 simvastatin (ZOCOR) 40 mg tablet Take 1 Tab by mouth nightly. 90 Tab 3  
 levothyroxine (SYNTHROID) 50 mcg tablet Take 1 Tab by mouth daily. 90 Tab 3  
 lidocaine (LIDODERM) 5 % 1 Patch by TransDERmal route every twenty-four (24) hours. Apply patch to the affected area for 12 hours a day and remove for 12 hours a day. 1 Each 5  
 simethicone 125 mg chewable tablet Take 125 mg by mouth every six (6) hours as needed for Flatulence. 60 Tab 0  
 brimonidine (ALPHAGAN P) 0.1 % ophthalmic solution Administer 1 Drop to right eye every eight (8) hours.  esomeprazole (NEXIUM) 40 mg capsule Take  by mouth daily.  brimonidine (ALPHAGAN) 0.15 % ophthalmic solution Administer 1 Drop to right eye three (3) times daily.  VIT C/E/ZN/COPPR/LUTEIN/ZEAXAN (OCUVITE LUTEIN & ZEAXANTHIN PO) Take  by mouth.  acetaminophen (TYLENOL) 500 mg tablet Take  by mouth every six (6) hours as needed for Pain.  clobetasol (TEMOVATE) 0.05 % external solution Apply 1 Drop to affected area two (2) times a day.  dorzolamide-timolol (COSOPT) 2-0.5 % ophthalmic solution Administer 1 Drop to both eyes two (2) times a day.  ketoconazole (NIZORAL) 2 % shampoo Apply 1 mL to affected area daily.  TRAVATAN Z 0.004 % ophthalmic solution Administer 1 Drop to both eyes nightly.  aspirin 81 mg tablet Take 81 mg by mouth daily.  calcium-cholecalciferol, d3, (CALCIUM 600 + D) 600-125 mg-unit Tab Take 1 Tab by mouth two (2) times a day.  polyethylene glycol (MIRALAX) 17 gram packet Take 17 g by mouth daily.  Cholecalciferol, Vitamin D3, (VITAMIN D3) 1,000 unit cap Take 1 Tab by mouth daily. Objective: Physical Exam:  
 
Visit Vitals /84 (BP 1 Location: Right arm, BP Patient Position: At rest) Pulse 67 Temp 98.2 °F (36.8 °C) Resp 18 SpO2 96% General: well developed, well nourished Psych: cooperative. Pleasant Neuro: alert and oriented to person/place/situation. Otherwise nonfocal. 
Derm: Normal turgor for age, dry skin HEENT: Normocephalic, atraumatic. EOMI. Neck: Normal range of motion. Chest: Respirations nonlabored Cardio: RRR Abdomen: Soft, nondistended Lower extremities: No hemosiderrosis + significant varicosities Capillary refill <3 sec Right 1+ DP/PT Left 1+ DP/PT Assessment/Plan:  
 
Overall wound appears healthy. Will f/u in 3 weeks given transport and family needs. No sign of active infection.

## 2019-02-22 NOTE — WOUND CARE
02/22/19 1327 Wound Ankle Right;Lateral  
Date First Assessed/Time First Assessed: 02/01/19 1450   Wound Type: Venous  Location: Ankle  Orientation: Right;Lateral  
Dressing Status  Clean, dry, and intact Dressing Type  (bordered foam) Non-Pressure Injury Full thickness (subcut/muscle) Wound Length (cm) 1.5 cm Wound Width (cm) 1 cm Wound Depth (cm) 0.1 Wound Surface area (cm^2) 1.5 cm^2 Change in Wound Size % -15.38 Condition of Base Granulation Condition of Edges Open Tissue Type Red; Other (comment) Tissue Type Percent Pink 90 Tissue Type Percent Other (comment) 10 Drainage Amount  Small Drainage Color Serosanguinous Wound Odor None Periwound Skin Condition Intact Cleansing and Cleansing Agents  Normal saline **PATIENT IS TAKING AN ANTICOAGULANT ASPIRIN 81MG DAILY. **

## 2019-02-22 NOTE — PROGRESS NOTES
Wound Center Progress Note Patient: Kaylee De La Torre MRN: [id number]  SSN: [id number] YOB: 1923  Age: 80 y.o. Sex: female Subjective: Chief Complaint: 
R ankle wound History of Present Illness:    
 
Wound Caused By: Herminio Mckeon Associated Signs and Symptoms: drainage, infection Timing: constant Quality: wound Severity: full thickness Modifying Factors: age, overall infirmity Has been on Bax. Has HH assisting. Family very attentive but live in [place]. 2/22/2019 Has been performing dressings herself. Discharged from [facility]. Lives alone. Unable to drive. Extremely poor eyesight. Cannot see wound to place dressings. Past Medical History:  
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  
 Skin cancer of scalp 2012  TIA (transient ischemic attack)  Unspecified hypothyroidism 10/31/2012 Past Surgical History:  
Procedure Laterality Date  HX APPENDECTOMY  HX COLONOSCOPY    
 HX MALIGNANT SKIN LESION EXCISION    
 scalp  RECTAL SENSATION TEST, BALLOON Family History Problem Relation Age of Onset  Hypertension Mother  Heart Disease Father  Breast Cancer Neg Hx Social History Tobacco Use  Smoking status: Never Smoker  Smokeless tobacco: Never Used Substance Use Topics  Alcohol use: No  
   
Prior to Admission medications Medication Sig Start Date End Date Taking? Authorizing Provider  
traMADol (ULTRAM) 50 mg tablet Take 1 Tab by mouth every eight (8) hours as needed for Pain. Max Daily Amount: 150 mg. 12/27/18   Louise Zeng MD  
lubiPROStone [facility]) 8 mcg capsule Take 1 Cap by mouth daily (with breakfast). 6/7/18   Louise Zeng MD  
fluticasone [facility]) 50 mcg/actuation nasal spray USE 2 SPRAYS IN [facility] NOSTRIL ONCE DAILY 6/7/18   Louise Zeng MD  
enalapril (VASOTEC) 10 mg tablet Take 1 Tab by mouth two (2) times a day. 6/7/18   Louise Zeng MD  
simvastatin (ZOCOR) 40 mg tablet Take 1 Tab by mouth nightly. 6/7/18   Louise Zeng MD  
levothyroxine (SYNTHROID) 50 mcg tablet Take 1 Tab by mouth daily. 3/20/18   Bharath Hicks MD  
lidocaine (LIDODERM) 5 % 1 Patch by TransDERmal route every twenty-four (24) hours. Apply patch to the affected area for 12 hours a day and remove for 12 hours a day. 2/15/17   Louise Zeng MD  
simethicone 125 mg chewable tablet Take 125 mg by mouth every six (6) hours as needed for Flatulence. 1/30/17   July Cohen MD  
brimonidine (ALPHAGAN P) 0.1 % ophthalmic solution Administer 1 Drop to right eye every eight (8) hours. Provider, Historical  
esomeprazole (NEXIUM) 40 mg capsule Take  by mouth daily. Provider, Historical  
brimonidine (ALPHAGAN) 0.15 % ophthalmic solution Administer 1 Drop to right eye three (3) times daily. Provider, Historical  
VIT C/E/ZN/COPPR/LUTEIN/ZEAXAN (OCUVITE LUTEIN & ZEAXANTHIN PO) Take  by mouth. Provider, Historical  
acetaminophen (TYLENOL) 500 mg tablet Take  by mouth every six (6) hours as needed for Pain. Provider, Historical  
clobetasol (TEMOVATE) 0.05 % external solution Apply 1 Drop to affected area two (2) times a day. 4/1/14   Provider, Historical  
dorzolamide-timolol (COSOPT) 2-0.5 % ophthalmic solution Administer 1 Drop to both eyes two (2) times a day.  2/2/14   Provider, Historical  
 ketoconazole (NIZORAL) 2 % shampoo Apply 1 mL to affected area daily. 4/1/14   Provider, Historical  
TRAVATAN Z 0.004 % ophthalmic solution Administer 1 Drop to both eyes nightly. 2/2/14   Provider, Historical  
aspirin 81 mg tablet Take 81 mg by mouth daily. Provider, Historical  
calcium-cholecalciferol, d3, (CALCIUM 600 + D) 600-125 mg-unit Tab Take 1 Tab by mouth two (2) times a day. Provider, Historical  
polyethylene glycol (MIRALAX) 17 gram packet Take 17 g by mouth daily. Provider, Historical  
Cholecalciferol, Vitamin D3, (VITAMIN D3) 1,000 unit cap Take 1 Tab by mouth daily. Provider, Historical  
 
No Known Allergies Review of Systems: 
CONSTITUTIONAL: No fever, chills HEAD: No headache EYES: No visual loss ENT: No hearing loss SKIN: No rash CARDIOVASCULAR: No chest pain RESPIRATORY: No shortness of breath GASTROINTESTINAL: No nausea, vomiting GENITOURINARY: No excessive urination NEUROLOGICAL: No weakness MUSCULOSKELETAL: No muscle pain. No neck pain HEMATOLOGIC: No easy bleeding LYMPHATICS: No lymphedema. PSYCHIATRIC: No current depression ENDOCRINOLOGIC: No high sugars ALLERGIES: No history of asthma, hives, eczema or rhinitis. No results found for: HBA1C, CWX5ICMJ, HGBE8, YIQ5VONP, HKS3JXYK, WDG7HREF Immunization History Administered Date(s) Administered  Influenza High Dose Vaccine PF 10/12/2016, 11/29/2017, 12/05/2018  Influenza Vaccine 10/17/2013, 11/12/2014  Influenza Vaccine PF 09/10/2015  Influenza Vaccine Whole 09/06/2012  Pneumococcal Conjugate (PCV-13) 02/11/2015  Pneumococcal Polysaccharide (PPSV-23) 01/01/1993  TDAP Vaccine 05/31/2012  Tdap 05/31/2012  Zoster Vaccine, Live 11/13/2014 Body mass index is 18.86 kg/m². Current medications: 
Current Outpatient Medications Medication Sig Dispense Refill  traMADol (ULTRAM) 50 mg tablet Take 1 Tab by mouth every eight (8) hours as needed for Pain. Max Daily Amount: 150 mg. 30 Tab 0  
 lubiPROStone (AMITIZA) 8 mcg capsule Take 1 Cap by mouth daily (with breakfast). 90 Cap 3  
 fluticasone (FLONASE) 50 mcg/actuation nasal spray USE 2 SPRAYS IN EACH NOSTRIL ONCE DAILY 3 Bottle 3  
 enalapril (VASOTEC) 10 mg tablet Take 1 Tab by mouth two (2) times a day. 180 Tab 3  
 simvastatin (ZOCOR) 40 mg tablet Take 1 Tab by mouth nightly. 90 Tab 3  
 levothyroxine (SYNTHROID) 50 mcg tablet Take 1 Tab by mouth daily. 90 Tab 3  
 lidocaine (LIDODERM) 5 % 1 Patch by TransDERmal route every twenty-four (24) hours. Apply patch to the affected area for 12 hours a day and remove for 12 hours a day. 1 Each 5  
 simethicone 125 mg chewable tablet Take 125 mg by mouth every six (6) hours as needed for Flatulence. 60 Tab 0  
 brimonidine (ALPHAGAN P) 0.1 % ophthalmic solution Administer 1 Drop to right eye every eight (8) hours.  esomeprazole (NEXIUM) 40 mg capsule Take  by mouth daily.  brimonidine (ALPHAGAN) 0.15 % ophthalmic solution Administer 1 Drop to right eye three (3) times daily.  VIT C/E/ZN/COPPR/LUTEIN/ZEAXAN (OCUVITE LUTEIN & ZEAXANTHIN PO) Take  by mouth.  acetaminophen (TYLENOL) 500 mg tablet Take  by mouth every six (6) hours as needed for Pain.  clobetasol (TEMOVATE) 0.05 % external solution Apply 1 Drop to affected area two (2) times a day.  dorzolamide-timolol (COSOPT) 2-0.5 % ophthalmic solution Administer 1 Drop to both eyes two (2) times a day.  ketoconazole (NIZORAL) 2 % shampoo Apply 1 mL to affected area daily.  TRAVATAN Z 0.004 % ophthalmic solution Administer 1 Drop to both eyes nightly.  aspirin 81 mg tablet Take 81 mg by mouth daily.  calcium-cholecalciferol, d3, (CALCIUM 600 + D) 600-125 mg-unit Tab Take 1 Tab by mouth two (2) times a day.  polyethylene glycol (MIRALAX) 17 gram packet Take 17 g by mouth daily.  Cholecalciferol, Vitamin D3, (VITAMIN D3) 1,000 unit cap Take 1 Tab by mouth daily. Objective:  
 
Physical Exam:  
 
Visit Vitals /81 (BP 1 Location: Left arm, BP Patient Position: Sitting; At rest) Pulse 76 Temp 97.5 °F (36.4 °C) Resp 18 Wt 54.6 kg (120 lb 6.4 oz) SpO2 95% BMI 18.86 kg/m² General: well developed thin appearing Psych: cooperative. Pleasant Neuro: alert and oriented to person/place/situation. Otherwise nonfocal. 
Derm: Normal turgor for age, dry skin HEENT: Normocephalic, atraumatic. EOMI. Neck: Normal range of motion. Chest: Respirations nonlabored Cardio: RRR Abdomen: Soft, nondistended Lower extremities: No hemosiderrosis + significant varicosities Capillary refill <3 sec Right 1+ DP/PT Left 1+ DP/PT Assessment/Plan:  
 
[place] of skin within the wound improving. Patient unable to perform dressings herself due to age, flexibility, eyesight. Discussed with HH. Change to endoform and hydrafera. Recheck in 2-3 weeks given transport issues.

## 2019-02-25 ENCOUNTER — HOME CARE VISIT (OUTPATIENT)
Dept: SCHEDULING | Facility: HOME HEALTH | Age: 84
End: 2019-02-25
Payer: COMMERCIAL

## 2019-02-25 PROCEDURE — 400013 HH SOC

## 2019-02-25 PROCEDURE — G0299 HHS/HOSPICE OF RN EA 15 MIN: HCPCS

## 2019-02-25 PROCEDURE — 3331090001 HH PPS REVENUE CREDIT

## 2019-02-25 PROCEDURE — 3331090002 HH PPS REVENUE DEBIT

## 2019-02-26 VITALS
TEMPERATURE: 98 F | DIASTOLIC BLOOD PRESSURE: 82 MMHG | OXYGEN SATURATION: 98 % | HEART RATE: 59 BPM | RESPIRATION RATE: 20 BRPM | SYSTOLIC BLOOD PRESSURE: 140 MMHG

## 2019-02-26 PROCEDURE — 3331090002 HH PPS REVENUE DEBIT

## 2019-02-26 PROCEDURE — 3331090001 HH PPS REVENUE CREDIT

## 2019-02-27 ENCOUNTER — HOME CARE VISIT (OUTPATIENT)
Dept: SCHEDULING | Facility: HOME HEALTH | Age: 84
End: 2019-02-27
Payer: COMMERCIAL

## 2019-02-27 PROCEDURE — A4216 STERILE WATER/SALINE, 10 ML: HCPCS

## 2019-02-27 PROCEDURE — A6021 COLLAGEN DRESSING <=16 SQ IN: HCPCS

## 2019-02-27 PROCEDURE — G0299 HHS/HOSPICE OF RN EA 15 MIN: HCPCS

## 2019-02-27 PROCEDURE — A6402 STERILE GAUZE <= 16 SQ IN: HCPCS

## 2019-02-27 PROCEDURE — 3331090001 HH PPS REVENUE CREDIT

## 2019-02-27 PROCEDURE — 3331090002 HH PPS REVENUE DEBIT

## 2019-02-27 PROCEDURE — A6203 COMPOSITE DRSG <= 16 SQ IN: HCPCS

## 2019-02-28 PROCEDURE — 3331090001 HH PPS REVENUE CREDIT

## 2019-02-28 PROCEDURE — 3331090002 HH PPS REVENUE DEBIT

## 2019-03-01 ENCOUNTER — HOME CARE VISIT (OUTPATIENT)
Dept: HOME HEALTH SERVICES | Facility: HOME HEALTH | Age: 84
End: 2019-03-01
Payer: COMMERCIAL

## 2019-03-01 PROCEDURE — 3331090001 HH PPS REVENUE CREDIT

## 2019-03-01 PROCEDURE — 3331090002 HH PPS REVENUE DEBIT

## 2019-03-02 PROCEDURE — 3331090001 HH PPS REVENUE CREDIT

## 2019-03-02 PROCEDURE — 3331090002 HH PPS REVENUE DEBIT

## 2019-03-03 VITALS
DIASTOLIC BLOOD PRESSURE: 74 MMHG | HEART RATE: 74 BPM | RESPIRATION RATE: 18 BRPM | TEMPERATURE: 97.4 F | OXYGEN SATURATION: 96 % | SYSTOLIC BLOOD PRESSURE: 118 MMHG

## 2019-03-03 PROCEDURE — 3331090001 HH PPS REVENUE CREDIT

## 2019-03-03 PROCEDURE — 3331090002 HH PPS REVENUE DEBIT

## 2019-03-04 ENCOUNTER — HOME CARE VISIT (OUTPATIENT)
Dept: SCHEDULING | Facility: HOME HEALTH | Age: 84
End: 2019-03-04
Payer: COMMERCIAL

## 2019-03-04 VITALS
RESPIRATION RATE: 16 BRPM | SYSTOLIC BLOOD PRESSURE: 120 MMHG | TEMPERATURE: 97.3 F | HEART RATE: 64 BPM | DIASTOLIC BLOOD PRESSURE: 64 MMHG | OXYGEN SATURATION: 97 %

## 2019-03-04 PROCEDURE — 3331090002 HH PPS REVENUE DEBIT

## 2019-03-04 PROCEDURE — 3331090001 HH PPS REVENUE CREDIT

## 2019-03-04 PROCEDURE — G0299 HHS/HOSPICE OF RN EA 15 MIN: HCPCS

## 2019-03-05 PROCEDURE — 3331090001 HH PPS REVENUE CREDIT

## 2019-03-05 PROCEDURE — 3331090002 HH PPS REVENUE DEBIT

## 2019-03-06 ENCOUNTER — HOME CARE VISIT (OUTPATIENT)
Dept: SCHEDULING | Facility: HOME HEALTH | Age: 84
End: 2019-03-06
Payer: COMMERCIAL

## 2019-03-06 PROCEDURE — G0299 HHS/HOSPICE OF RN EA 15 MIN: HCPCS

## 2019-03-06 PROCEDURE — 3331090002 HH PPS REVENUE DEBIT

## 2019-03-06 PROCEDURE — 3331090001 HH PPS REVENUE CREDIT

## 2019-03-07 VITALS
DIASTOLIC BLOOD PRESSURE: 78 MMHG | HEART RATE: 68 BPM | TEMPERATURE: 97.4 F | RESPIRATION RATE: 18 BRPM | SYSTOLIC BLOOD PRESSURE: 118 MMHG | OXYGEN SATURATION: 98 %

## 2019-03-07 PROCEDURE — 3331090001 HH PPS REVENUE CREDIT

## 2019-03-07 PROCEDURE — 3331090002 HH PPS REVENUE DEBIT

## 2019-03-08 ENCOUNTER — HOME CARE VISIT (OUTPATIENT)
Dept: SCHEDULING | Facility: HOME HEALTH | Age: 84
End: 2019-03-08
Payer: COMMERCIAL

## 2019-03-08 PROCEDURE — G0299 HHS/HOSPICE OF RN EA 15 MIN: HCPCS

## 2019-03-08 PROCEDURE — 3331090002 HH PPS REVENUE DEBIT

## 2019-03-08 PROCEDURE — 3331090001 HH PPS REVENUE CREDIT

## 2019-03-09 PROCEDURE — 3331090002 HH PPS REVENUE DEBIT

## 2019-03-09 PROCEDURE — 3331090001 HH PPS REVENUE CREDIT

## 2019-03-10 PROCEDURE — 3331090002 HH PPS REVENUE DEBIT

## 2019-03-10 PROCEDURE — 3331090001 HH PPS REVENUE CREDIT

## 2019-03-11 ENCOUNTER — HOME CARE VISIT (OUTPATIENT)
Dept: SCHEDULING | Facility: HOME HEALTH | Age: 84
End: 2019-03-11
Payer: COMMERCIAL

## 2019-03-11 PROCEDURE — G0299 HHS/HOSPICE OF RN EA 15 MIN: HCPCS

## 2019-03-11 PROCEDURE — 3331090002 HH PPS REVENUE DEBIT

## 2019-03-11 PROCEDURE — 3331090001 HH PPS REVENUE CREDIT

## 2019-03-12 PROCEDURE — 3331090001 HH PPS REVENUE CREDIT

## 2019-03-12 PROCEDURE — 3331090002 HH PPS REVENUE DEBIT

## 2019-03-13 ENCOUNTER — HOME CARE VISIT (OUTPATIENT)
Dept: SCHEDULING | Facility: HOME HEALTH | Age: 84
End: 2019-03-13
Payer: COMMERCIAL

## 2019-03-13 VITALS
HEART RATE: 68 BPM | RESPIRATION RATE: 18 BRPM | TEMPERATURE: 97.6 F | OXYGEN SATURATION: 98 % | SYSTOLIC BLOOD PRESSURE: 124 MMHG | DIASTOLIC BLOOD PRESSURE: 82 MMHG

## 2019-03-13 PROCEDURE — 3331090001 HH PPS REVENUE CREDIT

## 2019-03-13 PROCEDURE — 3331090002 HH PPS REVENUE DEBIT

## 2019-03-13 PROCEDURE — G0299 HHS/HOSPICE OF RN EA 15 MIN: HCPCS

## 2019-03-14 PROCEDURE — 3331090001 HH PPS REVENUE CREDIT

## 2019-03-14 PROCEDURE — 3331090002 HH PPS REVENUE DEBIT

## 2019-03-15 ENCOUNTER — APPOINTMENT (OUTPATIENT)
Dept: WOUND CARE | Age: 84
End: 2019-03-15
Attending: PHYSICAL MEDICINE & REHABILITATION
Payer: COMMERCIAL

## 2019-03-15 PROCEDURE — 3331090002 HH PPS REVENUE DEBIT

## 2019-03-15 PROCEDURE — 3331090001 HH PPS REVENUE CREDIT

## 2019-03-16 PROCEDURE — 3331090002 HH PPS REVENUE DEBIT

## 2019-03-16 PROCEDURE — 3331090001 HH PPS REVENUE CREDIT

## 2019-03-17 VITALS
SYSTOLIC BLOOD PRESSURE: 124 MMHG | DIASTOLIC BLOOD PRESSURE: 76 MMHG | RESPIRATION RATE: 18 BRPM | RESPIRATION RATE: 18 BRPM | TEMPERATURE: 97.3 F | TEMPERATURE: 97.4 F | OXYGEN SATURATION: 97 % | DIASTOLIC BLOOD PRESSURE: 82 MMHG | SYSTOLIC BLOOD PRESSURE: 124 MMHG | HEART RATE: 70 BPM | HEART RATE: 68 BPM | OXYGEN SATURATION: 96 %

## 2019-03-17 PROCEDURE — 3331090001 HH PPS REVENUE CREDIT

## 2019-03-17 PROCEDURE — 3331090002 HH PPS REVENUE DEBIT

## 2019-03-18 ENCOUNTER — HOME CARE VISIT (OUTPATIENT)
Dept: SCHEDULING | Facility: HOME HEALTH | Age: 84
End: 2019-03-18
Payer: COMMERCIAL

## 2019-03-18 VITALS
RESPIRATION RATE: 18 BRPM | TEMPERATURE: 97.6 F | HEART RATE: 96 BPM | SYSTOLIC BLOOD PRESSURE: 124 MMHG | DIASTOLIC BLOOD PRESSURE: 82 MMHG | OXYGEN SATURATION: 96 %

## 2019-03-18 PROCEDURE — 3331090001 HH PPS REVENUE CREDIT

## 2019-03-18 PROCEDURE — G0299 HHS/HOSPICE OF RN EA 15 MIN: HCPCS

## 2019-03-18 PROCEDURE — 3331090002 HH PPS REVENUE DEBIT

## 2019-03-19 PROCEDURE — 3331090002 HH PPS REVENUE DEBIT

## 2019-03-19 PROCEDURE — 3331090001 HH PPS REVENUE CREDIT

## 2019-03-20 ENCOUNTER — HOME CARE VISIT (OUTPATIENT)
Dept: HOME HEALTH SERVICES | Facility: HOME HEALTH | Age: 84
End: 2019-03-20
Payer: COMMERCIAL

## 2019-03-20 PROCEDURE — 3331090001 HH PPS REVENUE CREDIT

## 2019-03-20 PROCEDURE — G0299 HHS/HOSPICE OF RN EA 15 MIN: HCPCS

## 2019-03-20 PROCEDURE — 3331090002 HH PPS REVENUE DEBIT

## 2019-03-21 PROCEDURE — 3331090002 HH PPS REVENUE DEBIT

## 2019-03-21 PROCEDURE — 3331090001 HH PPS REVENUE CREDIT

## 2019-03-22 ENCOUNTER — HOME CARE VISIT (OUTPATIENT)
Dept: SCHEDULING | Facility: HOME HEALTH | Age: 84
End: 2019-03-22
Payer: COMMERCIAL

## 2019-03-22 PROCEDURE — 3331090001 HH PPS REVENUE CREDIT

## 2019-03-22 PROCEDURE — 3331090002 HH PPS REVENUE DEBIT

## 2019-03-22 PROCEDURE — G0299 HHS/HOSPICE OF RN EA 15 MIN: HCPCS

## 2019-03-23 PROCEDURE — 3331090002 HH PPS REVENUE DEBIT

## 2019-03-23 PROCEDURE — 3331090001 HH PPS REVENUE CREDIT

## 2019-03-24 PROCEDURE — 3331090002 HH PPS REVENUE DEBIT

## 2019-03-24 PROCEDURE — 3331090001 HH PPS REVENUE CREDIT

## 2019-03-25 ENCOUNTER — HOME CARE VISIT (OUTPATIENT)
Dept: HOME HEALTH SERVICES | Facility: HOME HEALTH | Age: 84
End: 2019-03-25
Payer: COMMERCIAL

## 2019-03-25 PROCEDURE — 3331090002 HH PPS REVENUE DEBIT

## 2019-03-25 PROCEDURE — G0299 HHS/HOSPICE OF RN EA 15 MIN: HCPCS

## 2019-03-25 PROCEDURE — 3331090001 HH PPS REVENUE CREDIT

## 2019-03-26 PROCEDURE — 3331090002 HH PPS REVENUE DEBIT

## 2019-03-26 PROCEDURE — 3331090001 HH PPS REVENUE CREDIT

## 2019-03-27 ENCOUNTER — HOME CARE VISIT (OUTPATIENT)
Dept: HOME HEALTH SERVICES | Facility: HOME HEALTH | Age: 84
End: 2019-03-27
Payer: COMMERCIAL

## 2019-03-27 PROCEDURE — 3331090002 HH PPS REVENUE DEBIT

## 2019-03-27 PROCEDURE — G0299 HHS/HOSPICE OF RN EA 15 MIN: HCPCS

## 2019-03-27 PROCEDURE — 3331090001 HH PPS REVENUE CREDIT

## 2019-03-28 PROCEDURE — 3331090001 HH PPS REVENUE CREDIT

## 2019-03-28 PROCEDURE — 3331090002 HH PPS REVENUE DEBIT

## 2019-03-29 ENCOUNTER — HOME CARE VISIT (OUTPATIENT)
Dept: SCHEDULING | Facility: HOME HEALTH | Age: 84
End: 2019-03-29
Payer: COMMERCIAL

## 2019-03-29 ENCOUNTER — HOSPITAL ENCOUNTER (OUTPATIENT)
Dept: WOUND CARE | Age: 84
Discharge: HOME | End: 2019-03-29
Attending: PHYSICAL MEDICINE & REHABILITATION
Payer: COMMERCIAL

## 2019-03-29 PROCEDURE — 3331090001 HH PPS REVENUE CREDIT

## 2019-03-29 PROCEDURE — G0299 HHS/HOSPICE OF RN EA 15 MIN: HCPCS

## 2019-03-29 PROCEDURE — 11042 DBRDMT SUBQ TIS 1ST 20SQCM/<: CPT

## 2019-03-29 PROCEDURE — 3331090002 HH PPS REVENUE DEBIT

## 2019-03-29 NOTE — WOUND CARE
03/29/19 1422 Wound Ankle Right;Lateral  
Date First Assessed/Time First Assessed: 02/01/19 1450   Wound Type: Venous  Location: Ankle  Orientation: Right;Lateral  
Dressing Status  Clean, dry, and intact Dressing Type   
(Endoform, Hydrofera Ready, Coversite) Non-Pressure Injury Full thickness (subcut/muscle) Wound Length (cm) 1.2 cm Wound Width (cm) 1.3 cm Wound Depth (cm) 0.1 Wound Surface area (cm^2) 1.56 cm^2 Change in Wound Size % -20 Condition of Base Granulation Tissue Type Red Tissue Type Percent Red 95 Drainage Amount  Scant Drainage Color Serosanguinous Wound Odor None Periwound Skin Condition Intact Cleansing and Cleansing Agents  Normal saline Patient takes ASA 81mg Daily

## 2019-03-29 NOTE — WOUND CARE
[address] [place] [address], [facility] [address] Phone: [phone] Fax: [phone] Patient: Yimi Montez MRN: [id number]  SSN: [id number] YOB: 1923  Age: 80 y.o. Sex: female Return Appointment: 2 weeks with Raymond Palmer MD 
 
Instructions: Right Ankle Wound Cleanse wound with normal saline. . 
-Apply Endoform-Cut to wound size and apply to wound bed, then cover Endoform with Hydrofera Ready, (apply spongy side down and shiny side facing out) cover with Covrsite dressing. Change 3 times a week by [facility]. LLE Leg Wound Apply Hydrofera Ready: Cut to wound size, place in wound bed, shiny side out. Cover with ABD, Wrap with Rolled Gauze. Change 3x/week Home Health for Wound Assessment and Wound Care Should you experience increased redness, swelling, pain, foul odor, size of wound(s), or have a temperature over 101 degrees please contact the [address] [place] [address] Road at [phone] or if after hours contact your primary care physician or go to the hospital emergency department. Signed By: Toni Pinto RN   
 March 29, 2019

## 2019-03-29 NOTE — WOUND CARE
03/29/19 1451 Wound Leg Lower Anterior; Left Date First Assessed: 03/29/19   POA: Yes  Wound Type: Vascular  Location: Leg Lower  Wound Description (Optional): Hematoma  Orientation: Anterior; Left Dressing Status  Intact Dressing Type  Gauze Wound Length (cm) 6.5 cm Wound Width (cm) 4 cm Wound Depth (cm) 0.2 Wound Surface area (cm^2) 26 cm^2 Condition of Base Other (comment) 
(Congealed Blood) Tissue Type Percent Red 100 Drainage Amount  Moderate Drainage Color Sanguinous Wound Odor None Periwound Skin Condition Ecchymosis Cleansing and Cleansing Agents  Normal saline

## 2019-03-29 NOTE — PROCEDURES
Wound Center Debridement Patient: Reta Lopez MRN: [id number]  SSN: [id number] YOB: 1923  Age: 80 y.o. Sex: female March 29, 2019 Procedure Performed By: Gladis Sanchez MD 
 
Wound: 2 Left  Trauma Other part of lower leg To Fat Layer Type of Debridement:  Surgical   
 
Time Out Taken: Yes 
 
Pain Control: N/A Level: Skin/Subcutaneous Tissue Type of Tissue Removed: Dermis, Epidermis, Hematoma Frequency of Debridements: PRN Consent in chart Instrument: Blade Bleeding: Moderate Hemostasis: Pressure Procedural Pain: 0 Post-Procedural Pain: 0 Pre-Debridement Measurements: 0 x 0 x 0 cm Post-Debridement Measurements: 6.5 x 4.0 x 0.2 cm Surface Area Debrided: 26 sq. cm Response to Procedure: tolerated the procedure well with no complications

## 2019-03-30 PROCEDURE — 3331090002 HH PPS REVENUE DEBIT

## 2019-03-30 PROCEDURE — 3331090001 HH PPS REVENUE CREDIT

## 2019-03-30 NOTE — PROGRESS NOTES
Wound Center Progress Note Patient: Melissa Del Cid MRN: [id number]  SSN: [id number] YOB: 1923  Age: 80 y.o. Sex: female Subjective: Chief Complaint: 
R ankle wound History of Present Illness:    
 
Wound Caused By: Kapil Booker Associated Signs and Symptoms: drainage, infection Timing: constant Quality: wound Severity: full thickness Modifying Factors: age, overall infirmity Has been on Bax. Has HH assisting. Family very attentive but live in [facility]. 2/22/2019 Has been performing dressings herself. Discharged from [facility]. Lives alone. Unable to drive. Extremely poor eyesight. Cannot see wound to place dressings. 3/29/2019 Continue to due well with wound on the right, but has new wound on the left. Cannot recall any trauma. Not on any blood thinners. Past Medical History:  
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  
 Skin cancer of scalp 2012  TIA (transient ischemic attack)  Unspecified hypothyroidism 10/31/2012 Past Surgical History:  
Procedure Laterality Date  HX APPENDECTOMY  HX COLONOSCOPY    
 HX MALIGNANT SKIN LESION EXCISION    
 scalp  RECTAL SENSATION TEST, BALLOON Family History Problem Relation Age of Onset  Hypertension Mother  Heart Disease Father  Breast Cancer Neg Hx Social History Tobacco Use  
  Smoking status: Never Smoker  Smokeless tobacco: Never Used Substance Use Topics  Alcohol use: No  
   
Prior to Admission medications Medication Sig Start Date End Date Taking? Authorizing Provider  
trimethoprim-sulfamethoxazole (BACTRIM DS, SEPTRA DS) 160-800 mg per tablet Take 1 Tab by mouth two (2) times a day for 10 days. 3/20/19 3/30/19  Louise Bansal MD  
levothyroxine (SYNTHROID) 50 mcg tablet TAKE ONE (1) TABLET BY MOUTH DAILY. 3/15/19   Louise Bansal MD  
traMADol (ULTRAM) 50 mg tablet Take 1 Tab by mouth every eight (8) hours as needed for Pain. Max Daily Amount: 150 mg. 12/27/18   Louise Bansal MD  
lubiPROStone [facility]) 8 mcg capsule Take 1 Cap by mouth daily (with breakfast). 6/7/18   Louise Bansal MD  
fluticasone [facility]) 50 mcg/actuation nasal spray USE 2 SPRAYS IN [facility] NOSTRIL ONCE DAILY 6/7/18   Louise Bansal MD  
enalapril (VASOTEC) 10 mg tablet Take 1 Tab by mouth two (2) times a day. 6/7/18   Louise Bansal MD  
simvastatin (ZOCOR) 40 mg tablet Take 1 Tab by mouth nightly. 6/7/18   Louise Bansal MD  
lidocaine (LIDODERM) 5 % 1 Patch by TransDERmal route every twenty-four (24) hours. Apply patch to the affected area for 12 hours a day and remove for 12 hours a day. 2/15/17   Louise Bansal MD  
simethicone 125 mg chewable tablet Take 125 mg by mouth every six (6) hours as needed for Flatulence. 1/30/17   Nguyen Marie MD  
brimonidine (ALPHAGAN P) 0.1 % ophthalmic solution Administer 1 Drop to right eye every eight (8) hours. Provider, Historical  
esomeprazole (NEXIUM) 40 mg capsule Take  by mouth daily. Provider, Historical  
brimonidine (ALPHAGAN) 0.15 % ophthalmic solution Administer 1 Drop to right eye three (3) times daily. Provider, Historical  
VIT C/E/ZN/COPPR/LUTEIN/ZEAXAN (OCUVITE LUTEIN & ZEAXANTHIN PO) Take 1 Tab by mouth daily.     Provider, Historical  
acetaminophen (TYLENOL) 500 mg tablet Take 650 mg by mouth every six (6) hours as needed for Pain. Provider, Historical  
clobetasol (TEMOVATE) 0.05 % external solution Apply 1 Drop to affected area two (2) times a day. 4/1/14   Provider, Historical  
dorzolamide-timolol (COSOPT) 2-0.5 % ophthalmic solution Administer 1 Drop to both eyes two (2) times a day. 2/2/14   Provider, Historical  
ketoconazole (NIZORAL) 2 % shampoo Apply 1 mL to affected area daily. 4/1/14   Provider, Historical  
TRAVATAN Z 0.004 % ophthalmic solution Administer 1 Drop to both eyes nightly. 2/2/14   Provider, Historical  
aspirin 81 mg tablet Take 81 mg by mouth daily. Provider, Historical  
calcium-cholecalciferol, d3, (CALCIUM 600 + D) 600-125 mg-unit Tab Take 1 Tab by mouth two (2) times a day. Provider, Historical  
polyethylene glycol (MIRALAX) 17 gram packet Take 17 g by mouth daily. mixed with water or juice    Provider, Historical  
Cholecalciferol, Vitamin D3, (VITAMIN D3) 1,000 unit cap Take 1 Tab by mouth daily. Provider, Historical  
 
No Known Allergies Review of Systems: 
CONSTITUTIONAL: No fever, chills HEAD: No headache EYES: No visual loss ENT: No hearing loss SKIN: No rash CARDIOVASCULAR: No chest pain RESPIRATORY: No shortness of breath GASTROINTESTINAL: No nausea, vomiting GENITOURINARY: No excessive urination NEUROLOGICAL: No weakness MUSCULOSKELETAL: No muscle pain. No neck pain HEMATOLOGIC: No easy bleeding LYMPHATICS: No lymphedema. PSYCHIATRIC: No current depression ENDOCRINOLOGIC: No high sugars ALLERGIES: No history of asthma, hives, eczema or rhinitis. No results found for: HBA1C, IZV5XVBJ, HGBE8, HCX4MYMS, GWB9ZRIB, JTM2RHOS Immunization History Administered Date(s) Administered  Influenza High Dose Vaccine PF 10/12/2016, 11/29/2017, 12/05/2018  Influenza Vaccine 10/17/2013, 11/12/2014  Influenza Vaccine PF 09/10/2015  Influenza Vaccine Whole 09/06/2012  Pneumococcal Conjugate (PCV-13) 02/11/2015  Pneumococcal Polysaccharide (PPSV-23) 01/01/1993  TDAP Vaccine 05/31/2012  Tdap 05/31/2012  Zoster Vaccine, Live 11/13/2014 There is no height or weight on file to calculate BMI. Current medications: 
Current Outpatient Medications Medication Sig Dispense Refill  trimethoprim-sulfamethoxazole (BACTRIM DS, SEPTRA DS) 160-800 mg per tablet Take 1 Tab by mouth two (2) times a day for 10 days. 20 Tab 0  
 levothyroxine (SYNTHROID) 50 mcg tablet TAKE ONE (1) TABLET BY MOUTH DAILY. 90 Tab 2  
 traMADol (ULTRAM) 50 mg tablet Take 1 Tab by mouth every eight (8) hours as needed for Pain. Max Daily Amount: 150 mg. 30 Tab 0  
 lubiPROStone (AMITIZA) 8 mcg capsule Take 1 Cap by mouth daily (with breakfast). 90 Cap 3  
 fluticasone (FLONASE) 50 mcg/actuation nasal spray USE 2 SPRAYS IN EACH NOSTRIL ONCE DAILY 3 Bottle 3  
 enalapril (VASOTEC) 10 mg tablet Take 1 Tab by mouth two (2) times a day. 180 Tab 3  
 simvastatin (ZOCOR) 40 mg tablet Take 1 Tab by mouth nightly. 90 Tab 3  
 lidocaine (LIDODERM) 5 % 1 Patch by TransDERmal route every twenty-four (24) hours. Apply patch to the affected area for 12 hours a day and remove for 12 hours a day. 1 Each 5  
 simethicone 125 mg chewable tablet Take 125 mg by mouth every six (6) hours as needed for Flatulence. 60 Tab 0  
 brimonidine (ALPHAGAN P) 0.1 % ophthalmic solution Administer 1 Drop to right eye every eight (8) hours.  esomeprazole (NEXIUM) 40 mg capsule Take  by mouth daily.  brimonidine (ALPHAGAN) 0.15 % ophthalmic solution Administer 1 Drop to right eye three (3) times daily.  VIT C/E/ZN/COPPR/LUTEIN/ZEAXAN (OCUVITE LUTEIN & ZEAXANTHIN PO) Take 1 Tab by mouth daily.  acetaminophen (TYLENOL) 500 mg tablet Take 650 mg by mouth every six (6) hours as needed for Pain.  clobetasol (TEMOVATE) 0.05 % external solution Apply 1 Drop to affected area two (2) times a day.  dorzolamide-timolol (COSOPT) 2-0.5 % ophthalmic solution Administer 1 Drop to both eyes two (2) times a day.  ketoconazole (NIZORAL) 2 % shampoo Apply 1 mL to affected area daily.  TRAVATAN Z 0.004 % ophthalmic solution Administer 1 Drop to both eyes nightly.  aspirin 81 mg tablet Take 81 mg by mouth daily.  calcium-cholecalciferol, d3, (CALCIUM 600 + D) 600-125 mg-unit Tab Take 1 Tab by mouth two (2) times a day.  polyethylene glycol (MIRALAX) 17 gram packet Take 17 g by mouth daily. mixed with water or juice  Cholecalciferol, Vitamin D3, (VITAMIN D3) 1,000 unit cap Take 1 Tab by mouth daily. Objective:  
 
Physical Exam:  
 
There were no vitals taken for this visit. General: well developed thin appearing Psych: cooperative. Pleasant Neuro: alert and oriented to person/place/situation. Otherwise nonfocal. 
Derm: Normal turgor for age, dry skin HEENT: Normocephalic, atraumatic. EOMI. Neck: Normal range of motion. Chest: Respirations nonlabored Cardio: RRR Abdomen: Soft, nondistended Lower extremities: No hemosiderrosis + significant varicosities Capillary refill <3 sec Right 1+ DP/PT Left 1+ DP/PT Assessment/Plan:  
 
Right ankle Island of skin within the wound improving. Patient unable to perform dressings herself due to age, flexibility, eyesight. Discussed with HH. Change to endoform and hydrafera. Recheck in 2-3 weeks given transport issues. Left leg - debrided today. Start dressings.

## 2019-03-31 PROCEDURE — 3331090001 HH PPS REVENUE CREDIT

## 2019-03-31 PROCEDURE — 3331090002 HH PPS REVENUE DEBIT

## 2019-04-01 ENCOUNTER — HOME CARE VISIT (OUTPATIENT)
Dept: SCHEDULING | Facility: HOME HEALTH | Age: 84
End: 2019-04-01
Payer: COMMERCIAL

## 2019-04-01 PROCEDURE — G0299 HHS/HOSPICE OF RN EA 15 MIN: HCPCS

## 2019-04-01 PROCEDURE — 3331090002 HH PPS REVENUE DEBIT

## 2019-04-01 PROCEDURE — 3331090001 HH PPS REVENUE CREDIT

## 2019-04-02 PROCEDURE — 3331090002 HH PPS REVENUE DEBIT

## 2019-04-02 PROCEDURE — 3331090001 HH PPS REVENUE CREDIT

## 2019-04-03 ENCOUNTER — HOME CARE VISIT (OUTPATIENT)
Dept: SCHEDULING | Facility: HOME HEALTH | Age: 84
End: 2019-04-03
Payer: COMMERCIAL

## 2019-04-03 VITALS
HEART RATE: 76 BPM | HEART RATE: 82 BPM | OXYGEN SATURATION: 94 % | TEMPERATURE: 98.2 F | DIASTOLIC BLOOD PRESSURE: 64 MMHG | SYSTOLIC BLOOD PRESSURE: 114 MMHG | SYSTOLIC BLOOD PRESSURE: 110 MMHG | TEMPERATURE: 97.8 F | OXYGEN SATURATION: 99 % | TEMPERATURE: 97.8 F | RESPIRATION RATE: 16 BRPM | OXYGEN SATURATION: 98 % | RESPIRATION RATE: 18 BRPM | RESPIRATION RATE: 16 BRPM | HEART RATE: 68 BPM | DIASTOLIC BLOOD PRESSURE: 60 MMHG | SYSTOLIC BLOOD PRESSURE: 116 MMHG | DIASTOLIC BLOOD PRESSURE: 68 MMHG

## 2019-04-03 PROCEDURE — A6252 ABSORPT DRG >16 <=48 W/O BDR: HCPCS

## 2019-04-03 PROCEDURE — A6446 CONFORM BAND S W>=3" <5"/YD: HCPCS

## 2019-04-03 PROCEDURE — G0299 HHS/HOSPICE OF RN EA 15 MIN: HCPCS

## 2019-04-03 PROCEDURE — 3331090002 HH PPS REVENUE DEBIT

## 2019-04-03 PROCEDURE — A6209 FOAM DRSG <=16 SQ IN W/O BDR: HCPCS

## 2019-04-03 PROCEDURE — 3331090001 HH PPS REVENUE CREDIT

## 2019-04-03 PROCEDURE — A6212 FOAM DRG <=16 SQ IN W/BORDER: HCPCS

## 2019-04-04 PROCEDURE — 3331090001 HH PPS REVENUE CREDIT

## 2019-04-04 PROCEDURE — 3331090002 HH PPS REVENUE DEBIT

## 2019-04-05 ENCOUNTER — HOME CARE VISIT (OUTPATIENT)
Dept: SCHEDULING | Facility: HOME HEALTH | Age: 84
End: 2019-04-05
Payer: COMMERCIAL

## 2019-04-05 PROCEDURE — 3331090002 HH PPS REVENUE DEBIT

## 2019-04-05 PROCEDURE — G0299 HHS/HOSPICE OF RN EA 15 MIN: HCPCS

## 2019-04-05 PROCEDURE — 3331090001 HH PPS REVENUE CREDIT

## 2019-04-06 PROCEDURE — 3331090002 HH PPS REVENUE DEBIT

## 2019-04-06 PROCEDURE — 3331090001 HH PPS REVENUE CREDIT

## 2019-04-07 VITALS
HEART RATE: 62 BPM | TEMPERATURE: 98 F | DIASTOLIC BLOOD PRESSURE: 72 MMHG | SYSTOLIC BLOOD PRESSURE: 124 MMHG | OXYGEN SATURATION: 96 % | RESPIRATION RATE: 16 BRPM

## 2019-04-07 PROCEDURE — 3331090002 HH PPS REVENUE DEBIT

## 2019-04-07 PROCEDURE — 3331090001 HH PPS REVENUE CREDIT

## 2019-04-08 ENCOUNTER — HOME CARE VISIT (OUTPATIENT)
Dept: SCHEDULING | Facility: HOME HEALTH | Age: 84
End: 2019-04-08
Payer: COMMERCIAL

## 2019-04-08 PROCEDURE — 3331090002 HH PPS REVENUE DEBIT

## 2019-04-08 PROCEDURE — G0299 HHS/HOSPICE OF RN EA 15 MIN: HCPCS

## 2019-04-08 PROCEDURE — 3331090001 HH PPS REVENUE CREDIT

## 2019-04-09 VITALS
RESPIRATION RATE: 16 BRPM | SYSTOLIC BLOOD PRESSURE: 116 MMHG | OXYGEN SATURATION: 96 % | TEMPERATURE: 97.9 F | TEMPERATURE: 98 F | HEART RATE: 70 BPM | OXYGEN SATURATION: 94 % | HEART RATE: 78 BPM | DIASTOLIC BLOOD PRESSURE: 72 MMHG | DIASTOLIC BLOOD PRESSURE: 64 MMHG | SYSTOLIC BLOOD PRESSURE: 118 MMHG | RESPIRATION RATE: 16 BRPM

## 2019-04-09 PROCEDURE — 3331090001 HH PPS REVENUE CREDIT

## 2019-04-09 PROCEDURE — 3331090002 HH PPS REVENUE DEBIT

## 2019-04-10 ENCOUNTER — HOME CARE VISIT (OUTPATIENT)
Dept: SCHEDULING | Facility: HOME HEALTH | Age: 84
End: 2019-04-10
Payer: COMMERCIAL

## 2019-04-10 PROCEDURE — 3331090002 HH PPS REVENUE DEBIT

## 2019-04-10 PROCEDURE — G0299 HHS/HOSPICE OF RN EA 15 MIN: HCPCS

## 2019-04-10 PROCEDURE — 3331090001 HH PPS REVENUE CREDIT

## 2019-04-11 PROCEDURE — 3331090002 HH PPS REVENUE DEBIT

## 2019-04-11 PROCEDURE — 3331090001 HH PPS REVENUE CREDIT

## 2019-04-12 ENCOUNTER — HOSPITAL ENCOUNTER (OUTPATIENT)
Dept: WOUND CARE | Age: 84
Discharge: HOME | End: 2019-04-12
Attending: PHYSICAL MEDICINE & REHABILITATION
Payer: COMMERCIAL

## 2019-04-12 VITALS
HEART RATE: 74 BPM | OXYGEN SATURATION: 96 % | RESPIRATION RATE: 18 BRPM | RESPIRATION RATE: 18 BRPM | TEMPERATURE: 97.9 F | DIASTOLIC BLOOD PRESSURE: 74 MMHG | SYSTOLIC BLOOD PRESSURE: 118 MMHG | HEART RATE: 68 BPM | OXYGEN SATURATION: 94 % | TEMPERATURE: 97.8 F | SYSTOLIC BLOOD PRESSURE: 122 MMHG | DIASTOLIC BLOOD PRESSURE: 72 MMHG

## 2019-04-12 VITALS
SYSTOLIC BLOOD PRESSURE: 162 MMHG | TEMPERATURE: 97.5 F | DIASTOLIC BLOOD PRESSURE: 86 MMHG | OXYGEN SATURATION: 91 % | HEART RATE: 78 BPM | RESPIRATION RATE: 18 BRPM

## 2019-04-12 PROCEDURE — 3331090001 HH PPS REVENUE CREDIT

## 2019-04-12 PROCEDURE — 99215 OFFICE O/P EST HI 40 MIN: CPT

## 2019-04-12 PROCEDURE — 3331090002 HH PPS REVENUE DEBIT

## 2019-04-12 NOTE — WOUND CARE
[address] [place] [address] [address] Phone: [phone] Fax: [phone] Patient: Ronal Cain MRN: [id number]  SSN: [id number] YOB: 1923  Age: 80 y.o. Sex: female Return Appointment: 2 weeks with Apryl Ferrer MD 
 
Instructions: Right Ankle Wound Left anterior lower leg Cleanse wound with normal saline. . 
-Apply Endoform-Cut to wound size and apply to wound bed, then cover Endoform with Hydrofera Ready, (apply spongy side down and shiny side facing out) cover with Covrsite dressing. Change 3 times a week by [address] for Wound Assessment and Wound Care Should you experience increased redness, swelling, pain, foul odor, size of wound(s), or have a temperature over 101 degrees please contact the [address] [place] [address] Road at [phone] or if after hours contact your primary care physician or go to the hospital emergency department. Signed By: Bryan Lizmaa April 12, 2019

## 2019-04-12 NOTE — WOUND CARE
04/12/19 1320 Wound Leg Lower Anterior; Left Date First Assessed: 03/29/19   POA: Yes  Wound Type: Vascular  Location: Leg Lower  Wound Description (Optional): Hematoma  Orientation: Anterior; Left Dressing Status  Clean, dry, and intact Dressing Type   
(hydrofera ready, abd, gauze) Wound Length (cm) 6.2 cm Wound Width (cm) 4 cm Wound Depth (cm) 0.1 Wound Surface area (cm^2) 24.8 cm^2 Change in Wound Size % 4.62 Tissue Type Percent Red 100 Drainage Amount  Moderate Drainage Color Serosanguinous Wound Odor None Periwound Skin Condition Ecchymosis;Edematous Cleansing and Cleansing Agents  Soap and water;Normal saline Wound Ankle Right;Lateral  
Date First Assessed/Time First Assessed: 02/01/19 1450   Wound Type: Venous  Location: Ankle  Orientation: Right;Lateral  
Dressing Status  Clean, dry, and intact Dressing Type   
(hydrofera ready, abd, rolled gauze) Non-Pressure Injury Full thickness (subcut/muscle) Wound Length (cm) 0.4 cm Wound Width (cm) 0.9 cm Wound Depth (cm) 0.1 Wound Surface area (cm^2) 0.36 cm^2 Change in Wound Size % 72.31 Condition of Base Granulation Tissue Type Pink Tissue Type Percent Pink 100 Drainage Amount  Scant Drainage Color Serous Wound Odor None Periwound Skin Condition Intact Cleansing and Cleansing Agents  Soap and water;Normal saline Patient is on an anti-coagulant ASA81 daily.

## 2019-04-13 VITALS
RESPIRATION RATE: 18 BRPM | HEART RATE: 76 BPM | DIASTOLIC BLOOD PRESSURE: 74 MMHG | OXYGEN SATURATION: 94 % | SYSTOLIC BLOOD PRESSURE: 118 MMHG | TEMPERATURE: 98.2 F

## 2019-04-13 PROCEDURE — 3331090002 HH PPS REVENUE DEBIT

## 2019-04-13 PROCEDURE — 3331090001 HH PPS REVENUE CREDIT

## 2019-04-14 PROCEDURE — 3331090001 HH PPS REVENUE CREDIT

## 2019-04-14 PROCEDURE — 3331090002 HH PPS REVENUE DEBIT

## 2019-04-15 ENCOUNTER — HOME CARE VISIT (OUTPATIENT)
Dept: SCHEDULING | Facility: HOME HEALTH | Age: 84
End: 2019-04-15
Payer: COMMERCIAL

## 2019-04-15 VITALS
DIASTOLIC BLOOD PRESSURE: 64 MMHG | HEART RATE: 86 BPM | RESPIRATION RATE: 16 BRPM | OXYGEN SATURATION: 97 % | TEMPERATURE: 97.6 F | SYSTOLIC BLOOD PRESSURE: 118 MMHG

## 2019-04-15 PROCEDURE — G0299 HHS/HOSPICE OF RN EA 15 MIN: HCPCS

## 2019-04-15 PROCEDURE — 3331090002 HH PPS REVENUE DEBIT

## 2019-04-15 PROCEDURE — 3331090001 HH PPS REVENUE CREDIT

## 2019-04-15 NOTE — PROGRESS NOTES
Wound Center Progress Note Patient: Linda Corona MRN: [id number]  SSN: [id number] YOB: 1923  Age: 80 y.o. Sex: female Subjective: Chief Complaint: 
R ankle wound History of Present Illness:    
 
Wound Caused By: Chowansarai Crisostomo Associated Signs and Symptoms: drainage, infection Timing: constant Quality: wound Severity: full thickness Modifying Factors: age, overall infirmity Has been on Bax. Has HH assisting. Family very attentive but live in [place]. 2/22/2019 Has been performing dressings herself. Discharged from [facility]. Lives alone. Unable to drive. Extremely poor eyesight. Cannot see wound to place dressings. 3/29/2019 Continue to due well with wound on the right, but has new wound on the left. Cannot recall any trauma. Not on any blood thinners. 4/12/2019 Did well with endoform. Denies any new wounds. Minimal range. Past Medical History:  
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  
 Skin cancer of scalp 2012  TIA (transient ischemic attack)  Unspecified hypothyroidism 10/31/2012 Past Surgical History:  
Procedure Laterality Date  HX APPENDECTOMY  HX COLONOSCOPY    
 HX MALIGNANT SKIN LESION EXCISION    
 scalp  RECTAL SENSATION TEST, BALLOON Family History Problem Relation Age of Onset  Hypertension Mother  Heart Disease Father  Breast Cancer Neg Hx Social History Tobacco Use  Smoking status: Never Smoker  Smokeless tobacco: Never Used Substance Use Topics  Alcohol use: No  
   
Prior to Admission medications Medication Sig Start Date End Date Taking? Authorizing Provider  
levothyroxine (SYNTHROID) 50 mcg tablet TAKE ONE (1) TABLET BY MOUTH DAILY. 3/15/19   Louise Moura MD  
traMADol (ULTRAM) 50 mg tablet Take 1 Tab by mouth every eight (8) hours as needed for Pain. Max Daily Amount: 150 mg. 12/27/18   Louise Moura MD  
lubiPROStone [facility]) 8 mcg capsule Take 1 Cap by mouth daily (with breakfast). 6/7/18   Louise Moura MD  
fluticasone [facility]) 50 mcg/actuation nasal spray USE 2 SPRAYS IN [facility] NOSTRIL ONCE DAILY 6/7/18   Louise Moura MD  
enalapril (VASOTEC) 10 mg tablet Take 1 Tab by mouth two (2) times a day. 6/7/18   Louise Moura MD  
simvastatin (ZOCOR) 40 mg tablet Take 1 Tab by mouth nightly. 6/7/18   Louise Moura MD  
lidocaine (LIDODERM) 5 % 1 Patch by TransDERmal route every twenty-four (24) hours. Apply patch to the affected area for 12 hours a day and remove for 12 hours a day. 2/15/17   Louise Moura MD  
simethicone 125 mg chewable tablet Take 125 mg by mouth every six (6) hours as needed for Flatulence. 1/30/17   Trisha Coronado MD  
brimonidine (ALPHAGAN P) 0.1 % ophthalmic solution Administer 1 Drop to right eye every eight (8) hours. Provider, Historical  
esomeprazole (NEXIUM) 40 mg capsule Take  by mouth daily. Provider, Historical  
brimonidine (ALPHAGAN) 0.15 % ophthalmic solution Administer 1 Drop to right eye three (3) times daily. Provider, Historical  
VIT C/E/ZN/COPPR/LUTEIN/ZEAXAN (OCUVITE LUTEIN & ZEAXANTHIN PO) Take 1 Tab by mouth daily. Provider, Historical  
acetaminophen (TYLENOL) 500 mg tablet Take 650 mg by mouth every six (6) hours as needed for Pain.     Provider, Historical  
 clobetasol (TEMOVATE) 0.05 % external solution Apply 1 Drop to affected area two (2) times a day. 4/1/14   Provider, Historical  
dorzolamide-timolol (COSOPT) 2-0.5 % ophthalmic solution Administer 1 Drop to both eyes two (2) times a day. 2/2/14   Provider, Historical  
ketoconazole (NIZORAL) 2 % shampoo Apply 1 mL to affected area daily. 4/1/14   Provider, Historical  
TRAVATAN Z 0.004 % ophthalmic solution Administer 1 Drop to both eyes nightly. 2/2/14   Provider, Historical  
aspirin 81 mg tablet Take 81 mg by mouth daily. Provider, Historical  
calcium-cholecalciferol, d3, (CALCIUM 600 + D) 600-125 mg-unit Tab Take 1 Tab by mouth two (2) times a day. Provider, Historical  
polyethylene glycol (MIRALAX) 17 gram packet Take 17 g by mouth daily. mixed with water or juice    Provider, Historical  
Cholecalciferol, Vitamin D3, (VITAMIN D3) 1,000 unit cap Take 1 Tab by mouth daily. Provider, Historical  
 
No Known Allergies Review of Systems: 
CONSTITUTIONAL: No fever, chills HEAD: No headache EYES: No visual loss ENT: No hearing loss SKIN: No rash CARDIOVASCULAR: No chest pain RESPIRATORY: No shortness of breath GASTROINTESTINAL: No nausea, vomiting GENITOURINARY: No excessive urination NEUROLOGICAL: No weakness MUSCULOSKELETAL: No muscle pain. No neck pain HEMATOLOGIC: No easy bleeding LYMPHATICS: No lymphedema. PSYCHIATRIC: No current depression ENDOCRINOLOGIC: No high sugars ALLERGIES: No history of asthma, hives, eczema or rhinitis. No results found for: HBA1C, MVC8ANYH, HGBE8, UBB1NRSL, HYR2TCJX, KNM7HSLC Immunization History Administered Date(s) Administered  Influenza High Dose Vaccine PF 10/12/2016, 11/29/2017, 12/05/2018  Influenza Vaccine 10/17/2013, 11/12/2014  Influenza Vaccine PF 09/10/2015  Influenza Vaccine Whole 09/06/2012  Pneumococcal Conjugate (PCV-13) 02/11/2015  Pneumococcal Polysaccharide (PPSV-23) 01/01/1993  TDAP Vaccine 05/31/2012  Tdap 05/31/2012  Zoster Vaccine, Live 11/13/2014 There is no height or weight on file to calculate BMI. Current medications: 
Current Outpatient Medications Medication Sig Dispense Refill  levothyroxine (SYNTHROID) 50 mcg tablet TAKE ONE (1) TABLET BY MOUTH DAILY. 90 Tab 2  
 traMADol (ULTRAM) 50 mg tablet Take 1 Tab by mouth every eight (8) hours as needed for Pain. Max Daily Amount: 150 mg. 30 Tab 0  
 lubiPROStone (AMITIZA) 8 mcg capsule Take 1 Cap by mouth daily (with breakfast). 90 Cap 3  
 fluticasone (FLONASE) 50 mcg/actuation nasal spray USE 2 SPRAYS IN EACH NOSTRIL ONCE DAILY 3 Bottle 3  
 enalapril (VASOTEC) 10 mg tablet Take 1 Tab by mouth two (2) times a day. 180 Tab 3  
 simvastatin (ZOCOR) 40 mg tablet Take 1 Tab by mouth nightly. 90 Tab 3  
 lidocaine (LIDODERM) 5 % 1 Patch by TransDERmal route every twenty-four (24) hours. Apply patch to the affected area for 12 hours a day and remove for 12 hours a day. 1 Each 5  
 simethicone 125 mg chewable tablet Take 125 mg by mouth every six (6) hours as needed for Flatulence. 60 Tab 0  
 brimonidine (ALPHAGAN P) 0.1 % ophthalmic solution Administer 1 Drop to right eye every eight (8) hours.  esomeprazole (NEXIUM) 40 mg capsule Take  by mouth daily.  brimonidine (ALPHAGAN) 0.15 % ophthalmic solution Administer 1 Drop to right eye three (3) times daily.  VIT C/E/ZN/COPPR/LUTEIN/ZEAXAN (OCUVITE LUTEIN & ZEAXANTHIN PO) Take 1 Tab by mouth daily.  acetaminophen (TYLENOL) 500 mg tablet Take 650 mg by mouth every six (6) hours as needed for Pain.  clobetasol (TEMOVATE) 0.05 % external solution Apply 1 Drop to affected area two (2) times a day.  dorzolamide-timolol (COSOPT) 2-0.5 % ophthalmic solution Administer 1 Drop to both eyes two (2) times a day.  ketoconazole (NIZORAL) 2 % shampoo Apply 1 mL to affected area daily.     
 TRAVATAN Z 0.004 % ophthalmic solution Administer 1 Drop to both eyes nightly.  aspirin 81 mg tablet Take 81 mg by mouth daily.  calcium-cholecalciferol, d3, (CALCIUM 600 + D) 600-125 mg-unit Tab Take 1 Tab by mouth two (2) times a day.  polyethylene glycol (MIRALAX) 17 gram packet Take 17 g by mouth daily. mixed with water or juice  Cholecalciferol, Vitamin D3, (VITAMIN D3) 1,000 unit cap Take 1 Tab by mouth daily. Objective:  
 
Physical Exam:  
 
Visit Vitals /86 (BP 1 Location: Left arm, BP Patient Position: Sitting) Pulse 78 Temp 97.5 °F (36.4 °C) Resp 18 SpO2 91% General: well developed thin appearing Psych: cooperative. Pleasant Neuro: alert and oriented to person/place/situation. Otherwise nonfocal. 
Derm: Normal turgor for age, dry skin HEENT: Normocephalic, atraumatic. EOMI. Neck: Normal range of motion. Chest: Respirations nonlabored Cardio: RRR Abdomen: Soft, nondistended Lower extremities: No hemosiderrosis + significant varicosities Capillary refill <3 sec Right 1+ DP/PT Left 1+ DP/PT Thin firable skin. Assessment/Plan:  
 
Right ankle Island of skin within the wound improving. Patient unable to perform dressings herself due to age, flexibility, eyesight. Discussed with HH. Continue endoform and hydrafera. Cotninue 2-3 weeks given transport issues. Left leg - almost resolved.

## 2019-04-16 PROCEDURE — 3331090002 HH PPS REVENUE DEBIT

## 2019-04-16 PROCEDURE — 3331090001 HH PPS REVENUE CREDIT

## 2019-04-17 ENCOUNTER — HOME CARE VISIT (OUTPATIENT)
Dept: SCHEDULING | Facility: HOME HEALTH | Age: 84
End: 2019-04-17
Payer: COMMERCIAL

## 2019-04-17 PROCEDURE — 3331090001 HH PPS REVENUE CREDIT

## 2019-04-17 PROCEDURE — G0299 HHS/HOSPICE OF RN EA 15 MIN: HCPCS

## 2019-04-17 PROCEDURE — 3331090002 HH PPS REVENUE DEBIT

## 2019-04-18 VITALS
RESPIRATION RATE: 18 BRPM | DIASTOLIC BLOOD PRESSURE: 64 MMHG | SYSTOLIC BLOOD PRESSURE: 118 MMHG | OXYGEN SATURATION: 96 %

## 2019-04-18 PROCEDURE — 3331090002 HH PPS REVENUE DEBIT

## 2019-04-18 PROCEDURE — 3331090001 HH PPS REVENUE CREDIT

## 2019-04-19 ENCOUNTER — HOME CARE VISIT (OUTPATIENT)
Dept: SCHEDULING | Facility: HOME HEALTH | Age: 84
End: 2019-04-19
Payer: COMMERCIAL

## 2019-04-19 PROCEDURE — G0299 HHS/HOSPICE OF RN EA 15 MIN: HCPCS

## 2019-04-19 PROCEDURE — 3331090001 HH PPS REVENUE CREDIT

## 2019-04-19 PROCEDURE — 3331090002 HH PPS REVENUE DEBIT

## 2019-04-20 PROCEDURE — 3331090002 HH PPS REVENUE DEBIT

## 2019-04-20 PROCEDURE — 3331090001 HH PPS REVENUE CREDIT

## 2019-04-21 VITALS
OXYGEN SATURATION: 98 % | SYSTOLIC BLOOD PRESSURE: 112 MMHG | RESPIRATION RATE: 18 BRPM | HEART RATE: 68 BPM | DIASTOLIC BLOOD PRESSURE: 66 MMHG | DIASTOLIC BLOOD PRESSURE: 64 MMHG | HEART RATE: 66 BPM | TEMPERATURE: 97 F | RESPIRATION RATE: 18 BRPM | OXYGEN SATURATION: 96 % | TEMPERATURE: 98.2 F | SYSTOLIC BLOOD PRESSURE: 112 MMHG

## 2019-04-21 PROCEDURE — 3331090002 HH PPS REVENUE DEBIT

## 2019-04-21 PROCEDURE — 3331090001 HH PPS REVENUE CREDIT

## 2019-04-22 ENCOUNTER — HOME CARE VISIT (OUTPATIENT)
Dept: SCHEDULING | Facility: HOME HEALTH | Age: 84
End: 2019-04-22
Payer: COMMERCIAL

## 2019-04-22 VITALS
SYSTOLIC BLOOD PRESSURE: 114 MMHG | DIASTOLIC BLOOD PRESSURE: 68 MMHG | OXYGEN SATURATION: 94 % | HEART RATE: 78 BPM | TEMPERATURE: 97.4 F | RESPIRATION RATE: 18 BRPM

## 2019-04-22 PROCEDURE — 3331090002 HH PPS REVENUE DEBIT

## 2019-04-22 PROCEDURE — 3331090001 HH PPS REVENUE CREDIT

## 2019-04-22 PROCEDURE — G0299 HHS/HOSPICE OF RN EA 15 MIN: HCPCS

## 2019-04-23 PROCEDURE — 3331090002 HH PPS REVENUE DEBIT

## 2019-04-23 PROCEDURE — 3331090001 HH PPS REVENUE CREDIT

## 2019-04-24 ENCOUNTER — HOME CARE VISIT (OUTPATIENT)
Dept: SCHEDULING | Facility: HOME HEALTH | Age: 84
End: 2019-04-24
Payer: COMMERCIAL

## 2019-04-24 PROCEDURE — G0299 HHS/HOSPICE OF RN EA 15 MIN: HCPCS

## 2019-04-24 PROCEDURE — 3331090001 HH PPS REVENUE CREDIT

## 2019-04-24 PROCEDURE — 3331090002 HH PPS REVENUE DEBIT

## 2019-04-25 PROCEDURE — 3331090001 HH PPS REVENUE CREDIT

## 2019-04-25 PROCEDURE — 3331090002 HH PPS REVENUE DEBIT

## 2019-04-26 ENCOUNTER — HOSPITAL ENCOUNTER (OUTPATIENT)
Dept: WOUND CARE | Age: 84
Discharge: HOME | End: 2019-04-26
Attending: PHYSICAL MEDICINE & REHABILITATION
Payer: COMMERCIAL

## 2019-04-26 VITALS
WEIGHT: 124 LBS | OXYGEN SATURATION: 93 % | TEMPERATURE: 97.8 F | BODY MASS INDEX: 19.42 KG/M2 | SYSTOLIC BLOOD PRESSURE: 139 MMHG | RESPIRATION RATE: 18 BRPM | HEART RATE: 57 BPM | DIASTOLIC BLOOD PRESSURE: 80 MMHG

## 2019-04-26 PROCEDURE — 3331090001 HH PPS REVENUE CREDIT

## 2019-04-26 PROCEDURE — 3331090002 HH PPS REVENUE DEBIT

## 2019-04-26 PROCEDURE — 99215 OFFICE O/P EST HI 40 MIN: CPT

## 2019-04-26 NOTE — PROGRESS NOTES
Wound Center Progress Note Patient: Harman Duff MRN: [id number]  SSN: [id number] YOB: 1923  Age: 80 y.o. Sex: female Subjective: Chief Complaint: 
R ankle wound History of Present Illness:    
 
Wound Caused By: Ahmad Shove Associated Signs and Symptoms: drainage, infection Timing: constant Quality: wound Severity: full thickness Modifying Factors: age, overall infirmity Has been on Bax. Has HH assisting. Family very attentive but live in [facility]. 2/22/2019 Has been performing dressings herself. Discharged from [facility]. Lives alone. Unable to drive. Extremely poor eyesight. Cannot see wound to place dressings. 3/29/2019 Continue to due well with wound on the right, but has new wound on the left. Cannot recall any trauma. Not on any blood thinners. 4/12/2019 Did well with endoform. Denies any new wounds. Minimal range. 4/26/2019 Feel right side may be healed. No more falls or other trauma. Past Medical History:  
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  
 Skin cancer of scalp 2012  TIA (transient ischemic attack)  Unspecified hypothyroidism 10/31/2012 Past Surgical History:  
Procedure Laterality Date  HX APPENDECTOMY  HX COLONOSCOPY    
 HX MALIGNANT SKIN LESION EXCISION    
 scalp  RECTAL SENSATION TEST, BALLOON Family History Problem Relation Age of Onset  Hypertension Mother  Heart Disease Father  Breast Cancer Neg Hx Social History Tobacco Use  Smoking status: Never Smoker  Smokeless tobacco: Never Used Substance Use Topics  Alcohol use: No  
   
Prior to Admission medications Medication Sig Start Date End Date Taking? Authorizing Provider  
levothyroxine (SYNTHROID) 50 mcg tablet TAKE ONE (1) TABLET BY MOUTH DAILY. 3/15/19   Louise Carmichael MD  
traMADol (ULTRAM) 50 mg tablet Take 1 Tab by mouth every eight (8) hours as needed for Pain. Max Daily Amount: 150 mg. 12/27/18   Louise Carmichael MD  
lubiPROStone [facility]) 8 mcg capsule Take 1 Cap by mouth daily (with breakfast). 6/7/18   Louise Carmichael MD  
fluticasone [facility]) 50 mcg/actuation nasal spray USE 2 SPRAYS IN [facility] NOSTRIL ONCE DAILY 6/7/18   Louise Carmichael MD  
enalapril (VASOTEC) 10 mg tablet Take 1 Tab by mouth two (2) times a day. 6/7/18   Louise Carmichael MD  
simvastatin (ZOCOR) 40 mg tablet Take 1 Tab by mouth nightly. 6/7/18   Louise Carmichael MD  
lidocaine (LIDODERM) 5 % 1 Patch by TransDERmal route every twenty-four (24) hours. Apply patch to the affected area for 12 hours a day and remove for 12 hours a day. 2/15/17   Louise Carmichael MD  
simethicone 125 mg chewable tablet Take 125 mg by mouth every six (6) hours as needed for Flatulence. 1/30/17   Connie Davis MD  
brimonidine (ALPHAGAN P) 0.1 % ophthalmic solution Administer 1 Drop to right eye every eight (8) hours. Provider, Historical  
esomeprazole (NEXIUM) 40 mg capsule Take  by mouth daily. Provider, Historical  
brimonidine (ALPHAGAN) 0.15 % ophthalmic solution Administer 1 Drop to right eye three (3) times daily. Provider, Historical  
VIT C/E/ZN/COPPR/LUTEIN/ZEAXAN (OCUVITE LUTEIN & ZEAXANTHIN PO) Take 1 Tab by mouth daily.     Provider, Historical  
acetaminophen (TYLENOL) 500 mg tablet Take 650 mg by mouth every six (6) hours as needed for Pain. Provider, Historical  
clobetasol (TEMOVATE) 0.05 % external solution Apply 1 Drop to affected area two (2) times a day. 4/1/14   Provider, Historical  
dorzolamide-timolol (COSOPT) 2-0.5 % ophthalmic solution Administer 1 Drop to both eyes two (2) times a day. 2/2/14   Provider, Historical  
ketoconazole (NIZORAL) 2 % shampoo Apply 1 mL to affected area daily. 4/1/14   Provider, Historical  
TRAVATAN Z 0.004 % ophthalmic solution Administer 1 Drop to both eyes nightly. 2/2/14   Provider, Historical  
aspirin 81 mg tablet Take 81 mg by mouth daily. Provider, Historical  
calcium-cholecalciferol, d3, (CALCIUM 600 + D) 600-125 mg-unit Tab Take 1 Tab by mouth two (2) times a day. Provider, Historical  
polyethylene glycol (MIRALAX) 17 gram packet Take 17 g by mouth daily. mixed with water or juice    Provider, Historical  
Cholecalciferol, Vitamin D3, (VITAMIN D3) 1,000 unit cap Take 1 Tab by mouth daily. Provider, Historical  
 
No Known Allergies Review of Systems: 
CONSTITUTIONAL: No fever, chills HEAD: No headache EYES: No visual loss ENT: No hearing loss SKIN: No rash CARDIOVASCULAR: No chest pain RESPIRATORY: No shortness of breath GASTROINTESTINAL: No nausea, vomiting GENITOURINARY: No excessive urination NEUROLOGICAL: No weakness MUSCULOSKELETAL: No muscle pain. No neck pain HEMATOLOGIC: No easy bleeding LYMPHATICS: No lymphedema. PSYCHIATRIC: No current depression ENDOCRINOLOGIC: No high sugars ALLERGIES: No history of asthma, hives, eczema or rhinitis. No results found for: HBA1C, MFT6QVPR, HGBE8, MQB2NNNX, WCS8FGJH, GYT8YXMQ Immunization History Administered Date(s) Administered  Influenza High Dose Vaccine PF 10/12/2016, 11/29/2017, 12/05/2018  Influenza Vaccine 10/17/2013, 11/12/2014  Influenza Vaccine PF 09/10/2015  Influenza Vaccine Whole 09/06/2012  Pneumococcal Conjugate (PCV-13) 02/11/2015  Pneumococcal Polysaccharide (PPSV-23) 01/01/1993  TDAP Vaccine 05/31/2012  Tdap 05/31/2012  Zoster Vaccine, Live 11/13/2014 Body mass index is 19.42 kg/m². Current medications: 
Current Outpatient Medications Medication Sig Dispense Refill  levothyroxine (SYNTHROID) 50 mcg tablet TAKE ONE (1) TABLET BY MOUTH DAILY. 90 Tab 2  
 traMADol (ULTRAM) 50 mg tablet Take 1 Tab by mouth every eight (8) hours as needed for Pain. Max Daily Amount: 150 mg. 30 Tab 0  
 lubiPROStone (AMITIZA) 8 mcg capsule Take 1 Cap by mouth daily (with breakfast). 90 Cap 3  
 fluticasone (FLONASE) 50 mcg/actuation nasal spray USE 2 SPRAYS IN EACH NOSTRIL ONCE DAILY 3 Bottle 3  
 enalapril (VASOTEC) 10 mg tablet Take 1 Tab by mouth two (2) times a day. 180 Tab 3  
 simvastatin (ZOCOR) 40 mg tablet Take 1 Tab by mouth nightly. 90 Tab 3  
 lidocaine (LIDODERM) 5 % 1 Patch by TransDERmal route every twenty-four (24) hours. Apply patch to the affected area for 12 hours a day and remove for 12 hours a day. 1 Each 5  
 simethicone 125 mg chewable tablet Take 125 mg by mouth every six (6) hours as needed for Flatulence. 60 Tab 0  
 brimonidine (ALPHAGAN P) 0.1 % ophthalmic solution Administer 1 Drop to right eye every eight (8) hours.  esomeprazole (NEXIUM) 40 mg capsule Take  by mouth daily.  brimonidine (ALPHAGAN) 0.15 % ophthalmic solution Administer 1 Drop to right eye three (3) times daily.  VIT C/E/ZN/COPPR/LUTEIN/ZEAXAN (OCUVITE LUTEIN & ZEAXANTHIN PO) Take 1 Tab by mouth daily.  acetaminophen (TYLENOL) 500 mg tablet Take 650 mg by mouth every six (6) hours as needed for Pain.  clobetasol (TEMOVATE) 0.05 % external solution Apply 1 Drop to affected area two (2) times a day.  dorzolamide-timolol (COSOPT) 2-0.5 % ophthalmic solution Administer 1 Drop to both eyes two (2) times a day.  ketoconazole (NIZORAL) 2 % shampoo Apply 1 mL to affected area daily.  TRAVATAN Z 0.004 % ophthalmic solution Administer 1 Drop to both eyes nightly.  aspirin 81 mg tablet Take 81 mg by mouth daily.  calcium-cholecalciferol, d3, (CALCIUM 600 + D) 600-125 mg-unit Tab Take 1 Tab by mouth two (2) times a day.  polyethylene glycol (MIRALAX) 17 gram packet Take 17 g by mouth daily. mixed with water or juice  Cholecalciferol, Vitamin D3, (VITAMIN D3) 1,000 unit cap Take 1 Tab by mouth daily. Objective:  
 
Physical Exam:  
 
Visit Vitals /80 (BP 1 Location: Left arm) Pulse (!) 57 Temp 97.8 °F (36.6 °C) Resp 18 Wt 56.2 kg (124 lb) SpO2 93% BMI 19.42 kg/m² General: well developed thin appearing Psych: cooperative. Pleasant Neuro: alert and oriented to person/place/situation. Otherwise nonfocal. 
Derm: Normal turgor for age, dry skin HEENT: Normocephalic, atraumatic. EOMI. Neck: Normal range of motion. Chest: Respirations nonlabored Cardio: RRR Abdomen: Soft, nondistended Lower extremities: No hemosiderrosis + significant varicosities Capillary refill <3 sec Right 1+ DP/PT Left 1+ DP/PT Thin friable skin. Assessment/Plan:  
 
Right ankle island of skin within the wound improving. Patient unable to perform dressings herself due to age, flexibility, eyesight. Discussed with HH. Continue endoform and hydrafera. Cotninue 2-3 weeks given transport issues. Left leg - almost resolved. Continue dressings.

## 2019-04-26 NOTE — WOUND CARE
04/26/19 1332 Wound Leg Lower Anterior; Left Date First Assessed: 03/29/19   POA: Yes  Wound Type: Vascular  Location: Leg Lower  Wound Description (Optional): Hematoma  Orientation: Anterior; Left Dressing Status  Clean, dry, and intact Dressing Type   
(endoform, abd, gauze) Wound Length (cm) 0 cm Wound Width (cm) 0 cm Wound Depth (cm) 0 Wound Surface area (cm^2) 0 cm^2 Change in Wound Size % 100 Epithelialization (%) 100 Tissue Type Percent Pink 100 Drainage Amount  None Wound Odor None Cleansing and Cleansing Agents  Soap and water Wound Ankle Right;Lateral  
Date First Assessed/Time First Assessed: 02/01/19 1450   Wound Type: Venous  Location: Ankle  Orientation: Right;Lateral  
Dressing Status  Old drainage Dressing Type   
(endoform, hydrofera, bordered foam) Non-Pressure Injury Full thickness (subcut/muscle) Wound Length (cm) 1 cm Wound Width (cm) 1 cm Wound Depth (cm) 0.1 Wound Surface area (cm^2) 1 cm^2 Change in Wound Size % 23.08 Condition of Base Granulation Tissue Type Red Tissue Type Percent Red 100 Drainage Amount  Small Drainage Color Serosanguinous Wound Odor None Periwound Skin Condition Intact Cleansing and Cleansing Agents  Soap and water Dressing Type Applied  
(endoform, hydrofera ready, covrsite) Patient is taking Aspirin daily.

## 2019-04-26 NOTE — DISCHARGE INSTRUCTIONS
Right Ankle Wound  Cleanse wound with normal saline. .  -Apply Endoform-Cut to wound size and apply to wound bed, then cover Endoform with Hydrofera Ready, (apply spongy side down and shiny side facing out) cover with Covrsite dressing.  Change 3 times a week by [address] for Wound Assessment and Wound Care

## 2019-04-26 NOTE — WOUND CARE
[address] [place] [address] [address]les [address] Phone: [phone] Fax: [phone] Patient: Dinorah Arthur MRN: [id number]  SSN: [id number] YOB: 1923  Age: 80 y.o. Sex: female Return Appointment: 2 weeks with Claudia Ramos MD 
 
Instructions:  
Right Ankle Wound Cleanse wound with normal saline. . 
-Apply Endoform-Cut to wound size and apply to wound bed, then cover Endoform with Hydrofera Ready, (apply spongy side down and shiny side facing out) cover with Covrsite dressing. Change 3 times a week by [address] for Wound Assessment and Wound Care Should you experience increased redness, swelling, pain, foul odor, size of wound(s), or have a temperature over 101 degrees please contact the [address] [place] [address] Road at [phone] or if after hours contact your primary care physician or go to the hospital emergency department. Signed By: Shiva White RN April 26, 2019

## 2019-04-27 PROCEDURE — 3331090001 HH PPS REVENUE CREDIT

## 2019-04-27 PROCEDURE — 3331090002 HH PPS REVENUE DEBIT

## 2019-04-28 PROCEDURE — 3331090002 HH PPS REVENUE DEBIT

## 2019-04-28 PROCEDURE — 3331090001 HH PPS REVENUE CREDIT

## 2019-04-29 ENCOUNTER — HOME CARE VISIT (OUTPATIENT)
Dept: SCHEDULING | Facility: HOME HEALTH | Age: 84
End: 2019-04-29
Payer: COMMERCIAL

## 2019-04-29 PROCEDURE — 400014 HH F/U

## 2019-04-29 PROCEDURE — 3331090002 HH PPS REVENUE DEBIT

## 2019-04-29 PROCEDURE — 3331090001 HH PPS REVENUE CREDIT

## 2019-04-29 PROCEDURE — G0299 HHS/HOSPICE OF RN EA 15 MIN: HCPCS

## 2019-04-30 VITALS
OXYGEN SATURATION: 98 % | RESPIRATION RATE: 18 BRPM | SYSTOLIC BLOOD PRESSURE: 122 MMHG | RESPIRATION RATE: 18 BRPM | TEMPERATURE: 97.8 F | DIASTOLIC BLOOD PRESSURE: 76 MMHG | HEART RATE: 74 BPM | SYSTOLIC BLOOD PRESSURE: 122 MMHG | DIASTOLIC BLOOD PRESSURE: 78 MMHG | TEMPERATURE: 97.8 F | HEART RATE: 60 BPM | OXYGEN SATURATION: 98 %

## 2019-04-30 PROCEDURE — 3331090002 HH PPS REVENUE DEBIT

## 2019-04-30 PROCEDURE — 3331090001 HH PPS REVENUE CREDIT

## 2019-05-01 ENCOUNTER — HOME CARE VISIT (OUTPATIENT)
Dept: SCHEDULING | Facility: HOME HEALTH | Age: 84
End: 2019-05-01
Payer: COMMERCIAL

## 2019-05-01 VITALS
HEART RATE: 64 BPM | DIASTOLIC BLOOD PRESSURE: 82 MMHG | SYSTOLIC BLOOD PRESSURE: 142 MMHG | TEMPERATURE: 98 F | RESPIRATION RATE: 18 BRPM

## 2019-05-01 PROCEDURE — 3331090001 HH PPS REVENUE CREDIT

## 2019-05-01 PROCEDURE — 3331090002 HH PPS REVENUE DEBIT

## 2019-05-01 PROCEDURE — G0299 HHS/HOSPICE OF RN EA 15 MIN: HCPCS

## 2019-05-02 PROCEDURE — 3331090002 HH PPS REVENUE DEBIT

## 2019-05-02 PROCEDURE — 3331090001 HH PPS REVENUE CREDIT

## 2019-05-03 ENCOUNTER — HOME CARE VISIT (OUTPATIENT)
Dept: SCHEDULING | Facility: HOME HEALTH | Age: 84
End: 2019-05-03
Payer: COMMERCIAL

## 2019-05-03 PROCEDURE — 3331090001 HH PPS REVENUE CREDIT

## 2019-05-03 PROCEDURE — 3331090002 HH PPS REVENUE DEBIT

## 2019-05-03 PROCEDURE — G0299 HHS/HOSPICE OF RN EA 15 MIN: HCPCS

## 2019-05-04 PROCEDURE — 3331090001 HH PPS REVENUE CREDIT

## 2019-05-04 PROCEDURE — 3331090002 HH PPS REVENUE DEBIT

## 2019-05-05 PROCEDURE — 3331090002 HH PPS REVENUE DEBIT

## 2019-05-05 PROCEDURE — 3331090001 HH PPS REVENUE CREDIT

## 2019-05-06 ENCOUNTER — HOME CARE VISIT (OUTPATIENT)
Dept: SCHEDULING | Facility: HOME HEALTH | Age: 84
End: 2019-05-06
Payer: COMMERCIAL

## 2019-05-06 PROCEDURE — A6212 FOAM DRG <=16 SQ IN W/BORDER: HCPCS

## 2019-05-06 PROCEDURE — 3331090001 HH PPS REVENUE CREDIT

## 2019-05-06 PROCEDURE — G0299 HHS/HOSPICE OF RN EA 15 MIN: HCPCS

## 2019-05-06 PROCEDURE — 3331090002 HH PPS REVENUE DEBIT

## 2019-05-07 PROCEDURE — 3331090001 HH PPS REVENUE CREDIT

## 2019-05-07 PROCEDURE — 3331090002 HH PPS REVENUE DEBIT

## 2019-05-08 ENCOUNTER — HOME CARE VISIT (OUTPATIENT)
Dept: SCHEDULING | Facility: HOME HEALTH | Age: 84
End: 2019-05-08
Payer: COMMERCIAL

## 2019-05-08 VITALS
SYSTOLIC BLOOD PRESSURE: 116 MMHG | TEMPERATURE: 97.2 F | OXYGEN SATURATION: 92 % | RESPIRATION RATE: 18 BRPM | HEART RATE: 62 BPM | DIASTOLIC BLOOD PRESSURE: 60 MMHG

## 2019-05-08 PROCEDURE — G0299 HHS/HOSPICE OF RN EA 15 MIN: HCPCS

## 2019-05-08 PROCEDURE — 3331090001 HH PPS REVENUE CREDIT

## 2019-05-08 PROCEDURE — 3331090002 HH PPS REVENUE DEBIT

## 2019-05-09 PROCEDURE — 3331090001 HH PPS REVENUE CREDIT

## 2019-05-09 PROCEDURE — 3331090002 HH PPS REVENUE DEBIT

## 2019-05-10 ENCOUNTER — HOSPITAL ENCOUNTER (OUTPATIENT)
Dept: WOUND CARE | Age: 84
Discharge: HOME | End: 2019-05-10
Attending: PHYSICAL MEDICINE & REHABILITATION
Payer: COMMERCIAL

## 2019-05-10 ENCOUNTER — HOME CARE VISIT (OUTPATIENT)
Dept: HOME HEALTH SERVICES | Facility: HOME HEALTH | Age: 84
End: 2019-05-10
Payer: COMMERCIAL

## 2019-05-10 VITALS
OXYGEN SATURATION: 94 % | HEART RATE: 67 BPM | DIASTOLIC BLOOD PRESSURE: 84 MMHG | RESPIRATION RATE: 18 BRPM | TEMPERATURE: 97.9 F | SYSTOLIC BLOOD PRESSURE: 136 MMHG

## 2019-05-10 PROCEDURE — 3331090001 HH PPS REVENUE CREDIT

## 2019-05-10 PROCEDURE — 3331090002 HH PPS REVENUE DEBIT

## 2019-05-10 PROCEDURE — 11043 DBRDMT MUSC&/FSCA 1ST 20/<: CPT

## 2019-05-10 NOTE — DISCHARGE INSTRUCTIONS
Right Ankle Wound:  Cleanse wound with normal saline. .  -Apply Endoform-Cut to wound size and apply to wound bed, then cover Endoform with Hydrofera Ready, (apply spongy side down and shiny side facing out) cover with Covrsite dressing. Change 3 times a week by [facility]. Left Posterior Lower Leg:  Cleanse wound with normal saline. -Apply Hydrofera Blue: Cut to wound size, moisten with saline, and apply to wound bed. Cover with ABD and rolled gauze.  Change 3 times a week by [facility].  [address] [place] [address] for Wound Assessment and Wound Care

## 2019-05-10 NOTE — PROCEDURES
Wound Center Debridement Patient: Deidre Stewart MRN: [id number]  SSN: [id number] YOB: 1923  Age: 80 y.o. Sex: female May 10, 2019 Procedure Performed By: Dav Green MD 
 
Wound: 2 Left  Trauma Posterior Lower Leg To Fat Layer Type of Debridement:  Surgical   
 
Time Out Taken: Yes 
 
Pain Control: N/A Level: Skin/Subcutaneous Tissue/Muscle/Fascia Type of Tissue Removed: Clots, Dermis, Epidermis, Exudate and Subcutaneous Frequency of Debridements: PRN Consent in chart Instrument: Blade Bleeding: Moderate Hemostasis: Pressure Procedural Pain: 0 Post-Procedural Pain: 0 Pre-Debridement Measurements: 5 x 4.5 x 0 cm Post-Debridement Measurements: 3 x 3 x 0.1 cm Surface Area Debrided: 22.5 sq. cm Response to Procedure: tolerated the procedure well with no complications

## 2019-05-10 NOTE — WOUND CARE
[address] [place] [address] [address] Phone: [phone] Fax: [phone] Patient: Mac Obreogn MRN: [id number]  SSN: [id number] YOB: 1923  Age: 80 y.o. Sex: female Return Appointment: 2 weeks with Acacia Parker MD 
 
Instructions:  
Right Ankle Wound: 
Cleanse wound with normal saline. . 
-Apply Endoform-Cut to wound size and apply to wound bed, then cover Endoform with Hydrofera Ready, (apply spongy side down and shiny side facing out) cover with Covrsite dressing. Change 3 times a week by [facility]. Left Posterior Lower Leg: 
Cleanse wound with normal saline. -Apply Hydrofera Blue: Cut to wound size, moisten with saline, and apply to wound bed. Cover with ABD and rolled gauze. Change 3 times a week by home health. 
[facility] for Wound Assessment and Wound Care Should you experience increased redness, swelling, pain, foul odor, size of wound(s), or have a temperature over 101 degrees please contact the [address] [place] [address] Road at [phone] or if after hours contact your primary care physician or go to the hospital emergency department. Signed By: Izzy Rayo RN May 10, 2019

## 2019-05-10 NOTE — WOUND CARE
05/10/19 1335 Wound Leg lower Left;Posterior Date First Assessed/Time First Assessed: 05/10/19 1349   Present on Hospital Admission: Yes  Primary Wound Type: Traumatic  Location: Leg lower  Wound Location Orientation: Left;Posterior Dressing Status Dry Dressing Type  
(rolled gauze) Wound Length (cm) 5 cm Wound Width (cm) 4.5 cm Wound Depth (cm) 0.1 cm Wound Volume (cm^3) 2.25 cm^3 Tissue Type Percent Maroon/Purple 100 % Drainage Amount None Dressing Type Applied (hydrofera blue, abd, rolled gauze) Wound Ankle Right;Lateral  
Date First Assessed/Time First Assessed: 02/01/19 1450   Wound Type: Venous  Location: Ankle  Orientation: Right;Lateral  
Dressing Status  Clean, dry, and intact Dressing Type   
(endoform only, no outer dressing) Non-Pressure Injury Full thickness (subcut/muscle) Wound Length (cm) 0.9 cm Wound Width (cm) 1 cm Wound Depth (cm) 0.1 Wound Surface area (cm^2) 0.9 cm^2 Change in Wound Size % 30.77 Condition of Base Granulation;Epithelializing Tissue Type Red Tissue Type Percent Red 100 Drainage Amount  Small Drainage Color Serosanguinous Wound Odor None Periwound Skin Condition Intact Cleansing and Cleansing Agents  Normal saline

## 2019-05-10 NOTE — WOUND CARE
05/10/19 1335 Wound Ankle Right;Lateral  
Date First Assessed/Time First Assessed: 02/01/19 1450   Wound Type: Venous  Location: Ankle  Orientation: Right;Lateral  
Dressing Status  Clean, dry, and intact Dressing Type   
(endoform only, no outer dressing) Non-Pressure Injury Full thickness (subcut/muscle) Wound Length (cm) 0.9 cm Wound Width (cm) 1 cm Wound Depth (cm) 0.1 Wound Surface area (cm^2) 0.9 cm^2 Change in Wound Size % 30.77 Condition of Base Granulation;Epithelializing Tissue Type Red Tissue Type Percent Red 100 Drainage Amount  Small Drainage Color Serosanguinous Wound Odor None Periwound Skin Condition Intact Cleansing and Cleansing Agents  Normal saline Patient is taking Aspirin daily.

## 2019-05-11 PROCEDURE — 3331090001 HH PPS REVENUE CREDIT

## 2019-05-11 PROCEDURE — 3331090002 HH PPS REVENUE DEBIT

## 2019-05-12 PROCEDURE — 3331090001 HH PPS REVENUE CREDIT

## 2019-05-12 PROCEDURE — 3331090002 HH PPS REVENUE DEBIT

## 2019-05-13 ENCOUNTER — HOME CARE VISIT (OUTPATIENT)
Dept: SCHEDULING | Facility: HOME HEALTH | Age: 84
End: 2019-05-13
Payer: COMMERCIAL

## 2019-05-13 PROCEDURE — A6446 CONFORM BAND S W>=3" <5"/YD: HCPCS

## 2019-05-13 PROCEDURE — G0299 HHS/HOSPICE OF RN EA 15 MIN: HCPCS

## 2019-05-13 PROCEDURE — 3331090002 HH PPS REVENUE DEBIT

## 2019-05-13 PROCEDURE — 3331090001 HH PPS REVENUE CREDIT

## 2019-05-13 PROCEDURE — A6252 ABSORPT DRG >16 <=48 W/O BDR: HCPCS

## 2019-05-13 PROCEDURE — A6212 FOAM DRG <=16 SQ IN W/BORDER: HCPCS

## 2019-05-14 PROCEDURE — 3331090001 HH PPS REVENUE CREDIT

## 2019-05-14 PROCEDURE — 3331090002 HH PPS REVENUE DEBIT

## 2019-05-15 ENCOUNTER — HOME CARE VISIT (OUTPATIENT)
Dept: SCHEDULING | Facility: HOME HEALTH | Age: 84
End: 2019-05-15
Payer: COMMERCIAL

## 2019-05-15 PROCEDURE — G0299 HHS/HOSPICE OF RN EA 15 MIN: HCPCS

## 2019-05-15 PROCEDURE — 3331090002 HH PPS REVENUE DEBIT

## 2019-05-15 PROCEDURE — 3331090001 HH PPS REVENUE CREDIT

## 2019-05-16 PROCEDURE — 3331090001 HH PPS REVENUE CREDIT

## 2019-05-16 PROCEDURE — 3331090002 HH PPS REVENUE DEBIT

## 2019-05-17 ENCOUNTER — HOME CARE VISIT (OUTPATIENT)
Dept: SCHEDULING | Facility: HOME HEALTH | Age: 84
End: 2019-05-17
Payer: COMMERCIAL

## 2019-05-17 PROCEDURE — 3331090002 HH PPS REVENUE DEBIT

## 2019-05-17 PROCEDURE — 3331090001 HH PPS REVENUE CREDIT

## 2019-05-17 PROCEDURE — G0299 HHS/HOSPICE OF RN EA 15 MIN: HCPCS

## 2019-05-17 NOTE — PROGRESS NOTES
Wound Center Progress Note Patient: Ronal Cain MRN: [id number]  SSN: [id number] YOB: 1923  Age: 80 y.o. Sex: female Subjective: Chief Complaint: 
R ankle wound History of Present Illness:    
 
Wound Caused By: Suzanne Orozco Associated Signs and Symptoms: drainage, infection Timing: constant Quality: wound Severity: full thickness Modifying Factors: age, overall infirmity Has been on Bax. Has HH assisting. Family very attentive but live in [place]. 2/22/2019 Has been performing dressings herself. Discharged from [facility]. Lives alone. Unable to drive. Extremely poor eyesight. Cannot see wound to place dressings. 3/29/2019 Continue to due well with wound on the right, but has new wound on the left. Cannot recall any trauma. Not on any blood thinners. 4/12/2019 Did well with endoform. Denies any new wounds. Minimal range. 4/26/2019 Feel right side may be healed. No more falls or other trauma. 5/10/2017 Does not recall any trauma, but has developed new wounds on the left side. No changes in medications. No recalled falls. Past Medical History:  
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  
 Skin cancer of scalp 2012  TIA (transient ischemic attack)  Unspecified hypothyroidism 10/31/2012 Past Surgical History:  
Procedure Laterality Date  HX APPENDECTOMY  HX COLONOSCOPY    
 HX MALIGNANT SKIN LESION EXCISION    
 scalp  RECTAL SENSATION TEST, BALLOON Family History Problem Relation Age of Onset  Hypertension Mother  Heart Disease Father  Breast Cancer Neg Hx Social History Tobacco Use  Smoking status: Never Smoker  Smokeless tobacco: Never Used Substance Use Topics  Alcohol use: No  
   
Prior to Admission medications Medication Sig Start Date End Date Taking? Authorizing Provider  
levothyroxine (SYNTHROID) 50 mcg tablet TAKE ONE (1) TABLET BY MOUTH DAILY. 3/15/19   Louise Arthur MD  
traMADol (ULTRAM) 50 mg tablet Take 1 Tab by mouth every eight (8) hours as needed for Pain. Max Daily Amount: 150 mg. 12/27/18   Louise Arthur MD  
lubiPROStone [facility]) 8 mcg capsule Take 1 Cap by mouth daily (with breakfast). 6/7/18   Louise Arthur MD  
fluticasone [facility]) 50 mcg/actuation nasal spray USE 2 SPRAYS IN [facility] NOSTRIL ONCE DAILY 6/7/18   Louise Arthur MD  
enalapril (VASOTEC) 10 mg tablet Take 1 Tab by mouth two (2) times a day. 6/7/18   Louise Arthur MD  
simvastatin (ZOCOR) 40 mg tablet Take 1 Tab by mouth nightly. 6/7/18   Louise Arthur MD  
lidocaine (LIDODERM) 5 % 1 Patch by TransDERmal route every twenty-four (24) hours. Apply patch to the affected area for 12 hours a day and remove for 12 hours a day. 2/15/17   Louise Arthur MD  
simethicone 125 mg chewable tablet Take 125 mg by mouth every six (6) hours as needed for Flatulence. 1/30/17   Rolly Booth MD  
brimonidine (ALPHAGAN P) 0.1 % ophthalmic solution Administer 1 Drop to right eye every eight (8) hours. Provider, Historical  
esomeprazole (NEXIUM) 40 mg capsule Take  by mouth daily. Provider, Historical  
brimonidine (ALPHAGAN) 0.15 % ophthalmic solution Administer 1 Drop to right eye three (3) times daily.     Provider, Historical  
VIT C/E/ZN/COPPR/LUTEIN/ZEAXAN (OCUVITE LUTEIN & ZEAXANTHIN PO) Take 1 Tab by mouth daily. Provider, Historical  
acetaminophen (TYLENOL) 500 mg tablet Take 650 mg by mouth every six (6) hours as needed for Pain. Provider, Historical  
clobetasol (TEMOVATE) 0.05 % external solution Apply 1 Drop to affected area two (2) times a day. apply to left upper arm twice a day 4/1/14   Provider, Historical  
dorzolamide-timolol (COSOPT) 2-0.5 % ophthalmic solution Administer 1 Drop to both eyes two (2) times a day. 2/2/14   Provider, Historical  
ketoconazole (NIZORAL) 2 % shampoo Apply 1 mL to affected area daily. apply to hair and let sit for 3 min and rinse out 4/1/14   Provider, Historical  
TRAVATAN Z 0.004 % ophthalmic solution Administer 1 Drop to both eyes nightly. 2/2/14   Provider, Historical  
aspirin 81 mg tablet Take 81 mg by mouth daily. Provider, Historical  
calcium-cholecalciferol, d3, (CALCIUM 600 + D) 600-125 mg-unit Tab Take 1 Tab by mouth two (2) times a day. Provider, Historical  
polyethylene glycol (MIRALAX) 17 gram packet Take 17 g by mouth daily. mixed with water or juice    Provider, Historical  
Cholecalciferol, Vitamin D3, (VITAMIN D3) 1,000 unit cap Take 1 Tab by mouth daily. Provider, Historical  
 
No Known Allergies Review of Systems: 
CONSTITUTIONAL: No fever, chills HEAD: No headache EYES: No visual loss ENT: No hearing loss SKIN: No rash CARDIOVASCULAR: No chest pain RESPIRATORY: No shortness of breath GASTROINTESTINAL: No nausea, vomiting GENITOURINARY: No excessive urination NEUROLOGICAL: No weakness MUSCULOSKELETAL: No muscle pain. No neck pain HEMATOLOGIC: No easy bleeding LYMPHATICS: No lymphedema. PSYCHIATRIC: No current depression ENDOCRINOLOGIC: No high sugars ALLERGIES: No history of asthma, hives, eczema or rhinitis. No results found for: HBA1C, VAN3XTKK, HGBE8, FCB9ANYR, ZOH4CGJX, RRC8WUYK Immunization History Administered Date(s) Administered  Influenza High Dose Vaccine PF 10/12/2016, 11/29/2017, 12/05/2018  Influenza Vaccine 10/17/2013, 11/12/2014  Influenza Vaccine PF 09/10/2015  Influenza Vaccine Whole 09/06/2012  Pneumococcal Conjugate (PCV-13) 02/11/2015  Pneumococcal Polysaccharide (PPSV-23) 01/01/1993  TDAP Vaccine 05/31/2012  Tdap 05/31/2012  Zoster Vaccine, Live 11/13/2014 There is no height or weight on file to calculate BMI. Current medications: 
Current Outpatient Medications Medication Sig Dispense Refill  levothyroxine (SYNTHROID) 50 mcg tablet TAKE ONE (1) TABLET BY MOUTH DAILY. 90 Tab 2  
 traMADol (ULTRAM) 50 mg tablet Take 1 Tab by mouth every eight (8) hours as needed for Pain. Max Daily Amount: 150 mg. 30 Tab 0  
 lubiPROStone (AMITIZA) 8 mcg capsule Take 1 Cap by mouth daily (with breakfast). 90 Cap 3  
 fluticasone (FLONASE) 50 mcg/actuation nasal spray USE 2 SPRAYS IN EACH NOSTRIL ONCE DAILY 3 Bottle 3  
 enalapril (VASOTEC) 10 mg tablet Take 1 Tab by mouth two (2) times a day. 180 Tab 3  
 simvastatin (ZOCOR) 40 mg tablet Take 1 Tab by mouth nightly. 90 Tab 3  
 lidocaine (LIDODERM) 5 % 1 Patch by TransDERmal route every twenty-four (24) hours. Apply patch to the affected area for 12 hours a day and remove for 12 hours a day. 1 Each 5  
 simethicone 125 mg chewable tablet Take 125 mg by mouth every six (6) hours as needed for Flatulence. 60 Tab 0  
 brimonidine (ALPHAGAN P) 0.1 % ophthalmic solution Administer 1 Drop to right eye every eight (8) hours.  esomeprazole (NEXIUM) 40 mg capsule Take  by mouth daily.  brimonidine (ALPHAGAN) 0.15 % ophthalmic solution Administer 1 Drop to right eye three (3) times daily.  VIT C/E/ZN/COPPR/LUTEIN/ZEAXAN (OCUVITE LUTEIN & ZEAXANTHIN PO) Take 1 Tab by mouth daily.  acetaminophen (TYLENOL) 500 mg tablet Take 650 mg by mouth every six (6) hours as needed for Pain.  clobetasol (TEMOVATE) 0.05 % external solution Apply 1 Drop to affected area two (2) times a day. apply to left upper arm twice a day  dorzolamide-timolol (COSOPT) 2-0.5 % ophthalmic solution Administer 1 Drop to both eyes two (2) times a day.  ketoconazole (NIZORAL) 2 % shampoo Apply 1 mL to affected area daily. apply to hair and let sit for 3 min and rinse out  TRAVATAN Z 0.004 % ophthalmic solution Administer 1 Drop to both eyes nightly.  aspirin 81 mg tablet Take 81 mg by mouth daily.  calcium-cholecalciferol, d3, (CALCIUM 600 + D) 600-125 mg-unit Tab Take 1 Tab by mouth two (2) times a day.  polyethylene glycol (MIRALAX) 17 gram packet Take 17 g by mouth daily. mixed with water or juice  Cholecalciferol, Vitamin D3, (VITAMIN D3) 1,000 unit cap Take 1 Tab by mouth daily. Objective:  
 
Physical Exam:  
 
Visit Vitals /84 (BP 1 Location: Left arm, BP Patient Position: At rest;Sitting) Pulse 67 Temp 97.9 °F (36.6 °C) Resp 18 SpO2 94% General: well developed thin appearing Psych: cooperative. Pleasant Neuro: alert and oriented to person/place/situation. Otherwise nonfocal. 
Derm: Normal turgor for age, dry skin HEENT: Normocephalic, atraumatic. EOMI. Neck: Normal range of motion. Chest: Respirations nonlabored Cardio: RRR Abdomen: Soft, nondistended Lower extremities: No hemosiderrosis + significant varicosities Capillary refill <3 sec Right 1+ DP/PT Left 1+ DP/PT Thin friable skin. Left wound ecchymoses and superficial skin loss. Assessment/Plan:  
 
Right ankle island of skin within the wound improving. Patient unable to perform dressings herself due to age, flexibility, eyesight. Discussed with HH. Continue endoform and hydrafera. Cotninue 2-3 weeks given transport issues. Left leg - new wound superiorly. Likely trauma. Debrided today.  Discussed potential sources of injury. Start dressing changes. Will likely be protracted course similar to other wounds.

## 2019-05-18 PROCEDURE — 3331090001 HH PPS REVENUE CREDIT

## 2019-05-18 PROCEDURE — 3331090002 HH PPS REVENUE DEBIT

## 2019-05-19 PROCEDURE — 3331090002 HH PPS REVENUE DEBIT

## 2019-05-19 PROCEDURE — 3331090001 HH PPS REVENUE CREDIT

## 2019-05-20 ENCOUNTER — HOME CARE VISIT (OUTPATIENT)
Dept: SCHEDULING | Facility: HOME HEALTH | Age: 84
End: 2019-05-20
Payer: COMMERCIAL

## 2019-05-20 VITALS
HEART RATE: 68 BPM | DIASTOLIC BLOOD PRESSURE: 68 MMHG | OXYGEN SATURATION: 96 % | TEMPERATURE: 97.6 F | SYSTOLIC BLOOD PRESSURE: 122 MMHG | RESPIRATION RATE: 18 BRPM

## 2019-05-20 PROCEDURE — G0299 HHS/HOSPICE OF RN EA 15 MIN: HCPCS

## 2019-05-20 PROCEDURE — 3331090001 HH PPS REVENUE CREDIT

## 2019-05-20 PROCEDURE — 3331090002 HH PPS REVENUE DEBIT

## 2019-05-21 PROCEDURE — 3331090002 HH PPS REVENUE DEBIT

## 2019-05-21 PROCEDURE — 3331090001 HH PPS REVENUE CREDIT

## 2019-05-22 ENCOUNTER — HOME CARE VISIT (OUTPATIENT)
Dept: SCHEDULING | Facility: HOME HEALTH | Age: 84
End: 2019-05-22
Payer: COMMERCIAL

## 2019-05-22 VITALS
TEMPERATURE: 97.4 F | SYSTOLIC BLOOD PRESSURE: 110 MMHG | SYSTOLIC BLOOD PRESSURE: 122 MMHG | DIASTOLIC BLOOD PRESSURE: 64 MMHG | HEART RATE: 60 BPM | TEMPERATURE: 97.8 F | RESPIRATION RATE: 18 BRPM | RESPIRATION RATE: 18 BRPM | HEART RATE: 60 BPM | DIASTOLIC BLOOD PRESSURE: 68 MMHG | RESPIRATION RATE: 18 BRPM | OXYGEN SATURATION: 95 % | OXYGEN SATURATION: 92 % | SYSTOLIC BLOOD PRESSURE: 108 MMHG | OXYGEN SATURATION: 94 % | DIASTOLIC BLOOD PRESSURE: 72 MMHG | TEMPERATURE: 97.2 F | HEART RATE: 62 BPM

## 2019-05-22 PROCEDURE — G0299 HHS/HOSPICE OF RN EA 15 MIN: HCPCS

## 2019-05-22 PROCEDURE — 3331090002 HH PPS REVENUE DEBIT

## 2019-05-22 PROCEDURE — 3331090001 HH PPS REVENUE CREDIT

## 2019-05-23 PROCEDURE — 3331090001 HH PPS REVENUE CREDIT

## 2019-05-23 PROCEDURE — 3331090002 HH PPS REVENUE DEBIT

## 2019-05-24 ENCOUNTER — HOSPITAL ENCOUNTER (OUTPATIENT)
Dept: WOUND CARE | Age: 84
Discharge: HOME | End: 2019-05-24
Attending: PHYSICAL MEDICINE & REHABILITATION
Payer: COMMERCIAL

## 2019-05-24 VITALS
RESPIRATION RATE: 18 BRPM | OXYGEN SATURATION: 99 % | BODY MASS INDEX: 19.48 KG/M2 | WEIGHT: 124.4 LBS | DIASTOLIC BLOOD PRESSURE: 77 MMHG | HEART RATE: 65 BPM | TEMPERATURE: 98.1 F | SYSTOLIC BLOOD PRESSURE: 128 MMHG

## 2019-05-24 PROCEDURE — 3331090002 HH PPS REVENUE DEBIT

## 2019-05-24 PROCEDURE — 3331090001 HH PPS REVENUE CREDIT

## 2019-05-24 PROCEDURE — 99215 OFFICE O/P EST HI 40 MIN: CPT

## 2019-05-24 NOTE — WOUND CARE
05/24/19 1313 Wound Leg lower Left;Posterior Date First Assessed/Time First Assessed: 05/10/19 1349   Present on Hospital Admission: Yes  Primary Wound Type: Traumatic  Location: Leg lower  Wound Location Orientation: Left;Posterior Dressing Status Breakthrough drainage Dressing Type (Hydrofera Blue, ABD, Rolled Gauze) Wound Length (cm) 5 cm Wound Width (cm) 3.8 cm Wound Depth (cm) 0.1 cm Wound Volume (cm^3) 1.9 cm^3 Tissue Type Percent Red 80 Tissue Type Percent Yellow 20 Drainage Amount Moderate Drainage Color Serous Wound Odor Mild Zeny-wound Assessment Intact Cleansing and Cleansing Agents  Normal saline Wound Ankle Right;Lateral  
Date First Assessed/Time First Assessed: 02/01/19 1450   Wound Type: Venous  Location: Ankle  Orientation: Right;Lateral  
Dressing Status  Clean, dry, and intact Dressing Type   
(Endoform, Bordered Foam) Non-Pressure Injury Full thickness (subcut/muscle) Wound Length (cm) 0.9 cm Wound Width (cm) 0.9 cm Wound Depth (cm) 0.1 Wound Surface area (cm^2) 0.81 cm^2 Change in Wound Size % 37.69 Condition of Base Granulation;Epithelializing Tissue Type Red;Pink Tissue Type Percent Red 100 Drainage Amount  Small Drainage Color Serous Wound Odor None Periwound Skin Condition Intact Cleansing and Cleansing Agents  Normal saline Patient takes ASA 81mg Daily

## 2019-05-24 NOTE — WOUND CARE
[address] [place] [address] [address] Phone: [phone] Fax: [phone] Patient: Reta Lopez MRN: [id number]  SSN: [id number] YOB: 1923  Age: 80 y.o. Sex: female Return Appointment: 2 weeks with Gladis Sanchez MD 
 
Instructions: Right Ankle Wound: 
Cleanse wound with normal saline. . 
-Apply Endoform-Cut to wound size and apply to wound bed, then cover Endoform with Hydrofera Ready, (apply spongy side down and shiny side facing out) cover with Covrsite dressing. Change 3 times a week by [facility]. 
  
Left Posterior Lower Leg: 
Cleanse wound with normal saline. -Apply Hydrofera Blue: Cut to wound size, moisten with saline, and apply to wound bed. Cover with ABD and rolled gauze. Change 3 times a week by home health. 
[facility] Assessment and [facility] Care 
  
 
 
 
Should you experience increased redness, swelling, pain, foul odor, size of wound(s), or have a temperature over 101 degrees please contact the [address] [place] [address] Road at [phone] or if after hours contact your primary care physician or go to the hospital emergency department. Signed By: Waqar Owusu RN May 24, 2019

## 2019-05-31 NOTE — PROGRESS NOTES
Wound Center Progress Note    Patient: Ronal Cain MRN: [id number]  SSN: [id number]    YOB: 1923  Age: 80 y.o. Sex: female      Subjective:     Chief Complaint:  R ankle wound      History of Present Illness:       Wound Caused By: Fall  Associated Signs and Symptoms: drainage, infection  Timing: constant  Quality: wound  Severity: full thickness  Modifying Factors: age, overall infirmity    Has been on Bax. Has HH assisting. Family very attentive but live in [place]. 2/22/2019 Has been performing dressings herself. Discharged from [facility]. Lives alone. Unable to drive. Extremely poor eyesight. Cannot see wound to place dressings. 3/29/2019  Continue to due well with wound on the right, but has new wound on the left. Cannot recall any trauma. Not on any blood thinners. 4/12/2019  Did well with endoform. Denies any new wounds. Minimal range. 4/26/2019  Feel right side may be healed. No more falls or other trauma. 5/10/2017  Does not recall any trauma, but has developed new wounds on the left side. No changes in medications. No recalled falls. 5/24/2019  No new issues. More recent wound is healing. No further trauma.      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    Constipation     Fatigue 10/31/2012    GERD (gastroesophageal reflux disease)     Glaucoma 1/3/2013    Hearing loss, sensorineural 1/3/2013    History of subdural hematoma (post traumatic)     Right frontal after fall 6/2016    Hypercholesterolemia     Hyperlipemia 10/31/2012    Hyperlipidemia 10/31/2012    Hypertension 10/31/2012    Macular degeneration 1/3/2013    Mitral valve disorders(424.0) 10/31/2012    Osteoarthrosis, unspecified whether generalized or localized, lower leg 3/13/2013    Other malaise and fatigue 10/31/2012    Senile osteoporosis 10/31/2012    Skin cancer of scalp 2012    TIA (transient ischemic attack)     Unspecified hypothyroidism 10/31/2012 Past Surgical History:   Procedure Laterality Date    HX APPENDECTOMY      HX COLONOSCOPY      HX MALIGNANT SKIN LESION EXCISION      scalp    RECTAL SENSATION TEST, BALLOON       Family History   Problem Relation Age of Onset    Hypertension Mother     Heart Disease Father     Breast Cancer Neg Hx       Social History     Tobacco Use    Smoking status: Never Smoker    Smokeless tobacco: Never Used   Substance Use Topics    Alcohol use: No       Prior to Admission medications    Medication Sig Start Date End Date Taking? Authorizing Provider   levothyroxine (SYNTHROID) 50 mcg tablet TAKE ONE (1) TABLET BY MOUTH DAILY. 3/15/19   Louise Parker MD   traMADol (ULTRAM) 50 mg tablet Take 1 Tab by mouth every eight (8) hours as needed for Pain. Max Daily Amount: 150 mg. 12/27/18   Louise Parker MD   lubiPROStone [facility]) 8 mcg capsule Take 1 Cap by mouth daily (with breakfast). 6/7/18   Louise Parker MD   fluticasone [facility]) 50 mcg/actuation nasal spray USE 2 SPRAYS  [address] NOSTRIL ONCE DAILY 6/7/18   Louise Parker MD   enalapril (VASOTEC) 10 mg tablet Take 1 Tab by mouth two (2) times a day. 6/7/18   Louise Parker MD   simvastatin (ZOCOR) 40 mg tablet Take 1 Tab by mouth nightly. 6/7/18   Louise Parker MD   lidocaine (LIDODERM) 5 % 1 Patch by TransDERmal route every twenty-four (24) hours. Apply patch to the affected area for 12 hours a day and remove for 12 hours a day. 2/15/17   Louise Parker MD   simethicone 125 mg chewable tablet Take 125 mg by mouth every six (6) hours as needed for Flatulence. 1/30/17   Stephania Browne MD   brimonidine (ALPHAGAN P) 0.1 % ophthalmic solution Administer 1 Drop to right eye every eight (8) hours. Provider, Historical   esomeprazole (NEXIUM) 40 mg capsule Take  by mouth daily. Provider, Historical   brimonidine (ALPHAGAN) 0.15 % ophthalmic solution Administer 1 Drop to right eye three (3) times daily.     Provider, Historical   VIT C/E/ZN/COPPR/LUTEIN/ZEAXAN (OCUVITE LUTEIN & ZEAXANTHIN PO) Take 1 Tab by mouth daily. Provider, Historical   acetaminophen (TYLENOL) 500 mg tablet Take 650 mg by mouth every six (6) hours as needed for Pain. Provider, Historical   clobetasol (TEMOVATE) 0.05 % external solution Apply 1 Drop to affected area two (2) times a day. apply to left upper arm twice a day 4/1/14   Provider, Historical   dorzolamide-timolol (COSOPT) 2-0.5 % ophthalmic solution Administer 1 Drop to both eyes two (2) times a day. 2/2/14   Provider, Historical   ketoconazole (NIZORAL) 2 % shampoo Apply 1 mL to affected area daily. apply to hair and let sit for 3 min and rinse out 4/1/14   Provider, Historical   TRAVATAN Z 0.004 % ophthalmic solution Administer 1 Drop to both eyes nightly. 2/2/14   Provider, Historical   aspirin 81 mg tablet Take 81 mg by mouth daily. Provider, Historical   calcium-cholecalciferol, d3, (CALCIUM 600 + D) 600-125 mg-unit Tab Take 1 Tab by mouth two (2) times a day. Provider, Historical   polyethylene glycol (MIRALAX) 17 gram packet Take 17 g by mouth daily. mixed with water or juice    Provider, Historical   Cholecalciferol, Vitamin D3, (VITAMIN D3) 1,000 unit cap Take 1 Tab by mouth daily. Provider, Historical     No Known Allergies     Review of Systems:  CONSTITUTIONAL: No fever, chills  HEAD: No headache  EYES: No visual loss  ENT: No hearing loss  SKIN: No rash  CARDIOVASCULAR: No chest pain  RESPIRATORY: No shortness of breath  GASTROINTESTINAL: No nausea, vomiting  GENITOURINARY: No excessive urination  NEUROLOGICAL: No weakness  MUSCULOSKELETAL: No muscle pain. No neck pain  HEMATOLOGIC: No easy bleeding  LYMPHATICS: No lymphedema. PSYCHIATRIC: No current depression  ENDOCRINOLOGIC: No high sugars  ALLERGIES: No history of asthma, hives, eczema or rhinitis.     No results found for: HBA1C, NXX9LAML, HGBE8, GOB1WFJX, PFU0HHJA, UVF9ATAU     Immunization History   Administered Date(s) Administered    Influenza High Dose Vaccine PF 10/12/2016, 11/29/2017, 12/05/2018    Influenza Vaccine 10/17/2013, 11/12/2014    Influenza Vaccine PF 09/10/2015    Influenza Vaccine Whole 09/06/2012    Pneumococcal Conjugate (PCV-13) 02/11/2015    Pneumococcal Polysaccharide (PPSV-23) 01/01/1993    TDAP Vaccine 05/31/2012    Tdap 05/31/2012    Zoster Vaccine, Live 11/13/2014       Body mass index is 19.48 kg/m². Current medications:  Current Outpatient Medications   Medication Sig Dispense Refill    levothyroxine (SYNTHROID) 50 mcg tablet TAKE ONE (1) TABLET BY MOUTH DAILY. 90 Tab 2    traMADol (ULTRAM) 50 mg tablet Take 1 Tab by mouth every eight (8) hours as needed for Pain. Max Daily Amount: 150 mg. 30 Tab 0    lubiPROStone (AMITIZA) 8 mcg capsule Take 1 Cap by mouth daily (with breakfast). 90 Cap 3    fluticasone (FLONASE) 50 mcg/actuation nasal spray USE 2 SPRAYS IN EACH NOSTRIL ONCE DAILY 3 Bottle 3    enalapril (VASOTEC) 10 mg tablet Take 1 Tab by mouth two (2) times a day. 180 Tab 3    simvastatin (ZOCOR) 40 mg tablet Take 1 Tab by mouth nightly. 90 Tab 3    lidocaine (LIDODERM) 5 % 1 Patch by TransDERmal route every twenty-four (24) hours. Apply patch to the affected area for 12 hours a day and remove for 12 hours a day. 1 Each 5    simethicone 125 mg chewable tablet Take 125 mg by mouth every six (6) hours as needed for Flatulence. 60 Tab 0    brimonidine (ALPHAGAN P) 0.1 % ophthalmic solution Administer 1 Drop to right eye every eight (8) hours.  esomeprazole (NEXIUM) 40 mg capsule Take  by mouth daily.  brimonidine (ALPHAGAN) 0.15 % ophthalmic solution Administer 1 Drop to right eye three (3) times daily.  VIT C/E/ZN/COPPR/LUTEIN/ZEAXAN (OCUVITE LUTEIN & ZEAXANTHIN PO) Take 1 Tab by mouth daily.  acetaminophen (TYLENOL) 500 mg tablet Take 650 mg by mouth every six (6) hours as needed for Pain.       clobetasol (TEMOVATE) 0.05 % external solution Apply 1 Drop to affected area two (2) times a day. apply to left upper arm twice a day      dorzolamide-timolol (COSOPT) 2-0.5 % ophthalmic solution Administer 1 Drop to both eyes two (2) times a day.  ketoconazole (NIZORAL) 2 % shampoo Apply 1 mL to affected area daily. apply to hair and let sit for 3 min and rinse out      TRAVATAN Z 0.004 % ophthalmic solution Administer 1 Drop to both eyes nightly.  aspirin 81 mg tablet Take 81 mg by mouth daily.  calcium-cholecalciferol, d3, (CALCIUM 600 + D) 600-125 mg-unit Tab Take 1 Tab by mouth two (2) times a day.  polyethylene glycol (MIRALAX) 17 gram packet Take 17 g by mouth daily. mixed with water or juice      Cholecalciferol, Vitamin D3, (VITAMIN D3) 1,000 unit cap Take 1 Tab by mouth daily. Objective:     Physical Exam:     Visit Vitals  /77 (BP 1 Location: Left arm)   Pulse 65   Temp 98.1 °F (36.7 °C)   Resp 18   Wt 56.4 kg (124 lb 6.4 oz)   SpO2 99%   BMI 19.48 kg/m²       General: well developed thin appearing  Psych: cooperative. Pleasant  Neuro: alert and oriented to person/place/situation. Otherwise nonfocal.  Derm: Normal turgor for age, dry skin  HEENT: Normocephalic, atraumatic. EOMI. Neck: Normal range of motion. Chest: Respirations nonlabored  Cardio: RRR  Abdomen: Soft, nondistended  Lower extremities:   No hemosiderrosis  + significant varicosities  Capillary refill <3 sec    Right  1+ DP/PT    Left  1+ DP/PT    Thin friable skin. Left wound ecchymoses, granulating. Assessment/Plan:     Right ankle island of skin within the wound improving. Patient unable to perform dressings herself due to age, flexibility, eyesight. Discussed with HH. Continue endoform and hydrafera. Cotninue 2-3 weeks given transport issues. Left leg - new wound superiorly. Likely trauma. Debrided today. Discussed potential sources of injury. Start dressing changes. Will likely be protracted course similar to other wounds.

## 2019-06-07 NOTE — PROGRESS NOTES
06/07/19 1323 Wound Leg lower Left;Posterior Date First Assessed/Time First Assessed: 05/10/19 1349   Present on Hospital Admission: Yes  Primary Wound Type: Traumatic  Location: Leg lower  Wound Location Orientation: Left;Posterior Dressing Status Clean, dry, and intact Dressing Type  
(Endoform, Hydrofera Ready, ABD, rolled gauze) Wound Length (cm) 0.7 cm Wound Width (cm) 0.8 cm Wound Depth (cm) 0.1 cm Wound Volume (cm^3) 0.06 cm^3 Condition of Base Epithelializing;Granulation Epithelialization (%) 80 Tissue Type Percent Red 20 Drainage Amount Scant Drainage Color Serous Wound Odor None Zeny-wound Assessment Intact Cleansing and Cleansing Agents  Normal saline Wound Ankle Right;Lateral  
Date First Assessed/Time First Assessed: 02/01/19 1450   Wound Type: Venous  Location: Ankle  Orientation: Right;Lateral  
Dressing Status  Clean, dry, and intact Dressing Type (Hydrofera blue, Coversite) Non-Pressure Injury Full thickness (subcut/muscle) Wound Length (cm) 0.8 cm Wound Width (cm) 1.5 cm Wound Depth (cm) 0.1 Wound Surface area (cm^2) 1.2 cm^2 Change in Wound Size % 7.69 Condition of Base Granulation Tissue Type Red Tissue Type Percent Red 100 Drainage Amount  Small Drainage Color Serosanguinous Wound Odor None Periwound Skin Condition Intact Cleansing and Cleansing Agents  Normal saline Patient is currently taking Aspirin 81 mg

## 2019-06-07 NOTE — WOUND CARE
[address] [place] [address], [facility] [address] Phone: [phone] Fax: [phone] Patient: Ksenia Dawson MRN: [id number]  SSN: [id number] YOB: 1923  Age: 80 y.o. Sex: female Return Appointment: 1 week with Inez Higgins MD 
 
Instructions: Right Ankle Wound and Left Posterior Lower Leg: 
Cleanse wound with normal saline. Chandra Princeton Apply Endoform-Cut to wound size and apply to wound bed, then cover Endoform with Hydrofera Ready, (apply spongy side down and shiny side facing out) cover right ankle with Coversite dressing. Cover Left lower leg with ABD and wrap with rolled gauze. Change dressing 3 times a week by [facility]. Should you experience increased redness, swelling, pain, foul odor, size of wound(s), or have a temperature over 101 degrees please contact the [address] [place] [address] Road at [phone] or if after hours contact your primary care physician or go to the hospital emergency department. Signed By: Frannie Bethea, PT, [address] [facility],[address] June 7, 2019

## 2019-06-14 NOTE — DISCHARGE INSTRUCTIONS
Right Ankle Wound:  Cleanse wound with normal saline. Manuel Toro Apply Endoform-Cut to wound size and apply to wound bed, then cover Endoform with Hydrofera Ready, (apply spongy side down and shiny side facing out) cover right ankle with Coversite dressing. Change dressing 3 times a week by [facility].

## 2019-06-14 NOTE — WOUND CARE
[address] [place] [address], [facility] [address] Phone: [phone] Fax: [phone] Patient: Kierra Zamora MRN: [id number]  SSN: [id number] YOB: 1923  Age: 80 y.o. Sex: female Return Appointment: 2 weeks with Uzma Ram MD 
 
Instructions:  
Right Ankle Wound: 
Cleanse wound with normal saline. Fang Delaneypool Apply Endoform-Cut to wound size and apply to wound bed, then cover Endoform with Hydrofera Ready, (apply spongy side down and shiny side facing out) cover right ankle with Coversite dressing. Change dressing 3 times a week by [facility]. Should you experience increased redness, swelling, pain, foul odor, size of wound(s), or have a temperature over 101 degrees please contact the [address] [place] [address] Road at [phone] or if after hours contact your primary care physician or go to the hospital emergency department. Signed By: Stalin Gilbert RN   
 June 14, 2019

## 2019-06-14 NOTE — WOUND CARE
06/14/19 1322 Wound Leg lower Left;Posterior Date First Assessed/Time First Assessed: 05/10/19 1349   Present on Hospital Admission: Yes  Primary Wound Type: Traumatic  Location: Leg lower  Wound Location Orientation: Left;Posterior Dressing Status Clean, dry, and intact Dressing Type  
(endoform, hydrofera blue, abd, rolled gauze) Wound Length (cm) 0 cm Wound Width (cm) 0 cm Wound Depth (cm) 0 cm Wound Volume (cm^3) 0 cm^3 Condition of Base Epithelializing Epithelialization (%) 100 Tissue Type Percent Pink 100 Drainage Amount None Wound Odor None Zeny-wound Assessment Intact Cleansing and Cleansing Agents  Normal saline Dressing Changed Changed/New Wound Ankle Right;Lateral  
Date First Assessed/Time First Assessed: 02/01/19 1450   Wound Type: Venous  Location: Ankle  Orientation: Right;Lateral  
Dressing Status  Old drainage Dressing Type   
(endoform, hydrofera ready, covrsite) Non-Pressure Injury Full thickness (subcut/muscle) Wound Length (cm) 1 cm Wound Width (cm) 1.5 cm Wound Depth (cm) 0.1 Wound Surface area (cm^2) 1.5 cm^2 Change in Wound Size % -15.38 Condition of Base Granulation;Epithelializing Tissue Type Red Tissue Type Percent Red 100 Drainage Amount  Small Drainage Color Serosanguinous Wound Odor None Periwound Skin Condition Intact Cleansing and Cleansing Agents  Normal saline

## 2019-06-14 NOTE — PROGRESS NOTES
Wound Center Progress Note Patient: Deidre Stewart MRN: [id number]  SSN: [id number] YOB: 1923  Age: 80 y.o. Sex: female Subjective: Chief Complaint: 
R ankle wound History of Present Illness:    
 
Wound Caused By: Abdiaziz Felix Associated Signs and Symptoms: drainage, infection Timing: constant Quality: wound Severity: full thickness Modifying Factors: age, overall infirmity Has been on Bax. Has HH assisting. Family very attentive but live in [place]. 2/22/2019 Has been performing dressings herself. Discharged from [facility]. Lives alone. Unable to drive. Extremely poor eyesight. Cannot see wound to place dressings. 3/29/2019 Continue to due well with wound on the right, but has new wound on the left. Cannot recall any trauma. Not on any blood thinners. 4/12/2019 Did well with endoform. Denies any new wounds. Minimal range. 4/26/2019 Feel right side may be healed. No more falls or other trauma. 5/10/2017 Does not recall any trauma, but has developed new wounds on the left side. No changes in medications. No recalled falls. 6/14/2019 No new issues. More recent wound is healing. Initial remains closed. No further trauma. Past Medical History:  
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  
 Skin cancer of scalp 2012  TIA (transient ischemic attack)  Unspecified hypothyroidism 10/31/2012 Past Surgical History:  
Procedure Laterality Date  HX APPENDECTOMY  HX COLONOSCOPY    
 HX MALIGNANT SKIN LESION EXCISION    
 scalp  RECTAL SENSATION TEST, BALLOON Family History Problem Relation Age of Onset  Hypertension Mother  Heart Disease Father  Breast Cancer Neg Hx Social History Tobacco Use  Smoking status: Never Smoker  Smokeless tobacco: Never Used Substance Use Topics  Alcohol use: No  
   
Prior to Admission medications Medication Sig Start Date End Date Taking? Authorizing Provider  
lubiPROStone (AMITIZA) 8 mcg capsule Take 1 Cap by mouth daily (with breakfast). 5/31/19   Louise Sadler MD  
levothyroxine (SYNTHROID) 50 mcg tablet TAKE ONE (1) TABLET BY MOUTH DAILY. 3/15/19   Louise Sadler MD  
traMADol (ULTRAM) 50 mg tablet Take 1 Tab by mouth every eight (8) hours as needed for Pain. Max Daily Amount: 150 mg. 12/27/18   Louise Sadler MD  
fluticasone [facility]) 50 mcg/actuation nasal spray USE 2 SPRAYS IN [facility] NOSTRIL ONCE DAILY 6/7/18   Louise Sadler MD  
enalapril (VASOTEC) 10 mg tablet Take 1 Tab by mouth two (2) times a day. 6/7/18   Louise Sadler MD  
simvastatin (ZOCOR) 40 mg tablet Take 1 Tab by mouth nightly. 6/7/18   Louise Sadler MD  
lidocaine (LIDODERM) 5 % 1 Patch by TransDERmal route every twenty-four (24) hours. Apply patch to the affected area for 12 hours a day and remove for 12 hours a day. 2/15/17   Louise Sadler MD  
simethicone 125 mg chewable tablet Take 125 mg by mouth every six (6) hours as needed for Flatulence. 1/30/17   Agustina Peralta MD  
brimonidine (ALPHAGAN P) 0.1 % ophthalmic solution Administer 1 Drop to right eye every eight (8) hours. Provider, Historical  
esomeprazole (NEXIUM) 40 mg capsule Take  by mouth daily.     Provider, Historical  
brimonidine (ALPHAGAN) 0.15 % ophthalmic solution Administer 1 Drop to right eye three (3) times daily. Provider, Historical  
VIT C/E/ZN/COPPR/LUTEIN/ZEAXAN (OCUVITE LUTEIN & ZEAXANTHIN PO) Take 1 Tab by mouth daily. Provider, Historical  
acetaminophen (TYLENOL) 500 mg tablet Take 650 mg by mouth every six (6) hours as needed for Pain. Provider, Historical  
clobetasol (TEMOVATE) 0.05 % external solution Apply 1 Drop to affected area two (2) times a day. apply to left upper arm twice a day 4/1/14   Provider, Historical  
dorzolamide-timolol (COSOPT) 2-0.5 % ophthalmic solution Administer 1 Drop to both eyes two (2) times a day. 2/2/14   Provider, Historical  
ketoconazole (NIZORAL) 2 % shampoo Apply 1 mL to affected area daily. apply to hair and let sit for 3 min and rinse out 4/1/14   Provider, Historical  
TRAVATAN Z 0.004 % ophthalmic solution Administer 1 Drop to both eyes nightly. 2/2/14   Provider, Historical  
aspirin 81 mg tablet Take 81 mg by mouth daily. Provider, Historical  
calcium-cholecalciferol, d3, (CALCIUM 600 + D) 600-125 mg-unit Tab Take 1 Tab by mouth two (2) times a day. Provider, Historical  
polyethylene glycol (MIRALAX) 17 gram packet Take 17 g by mouth daily. mixed with water or juice    Provider, Historical  
Cholecalciferol, Vitamin D3, (VITAMIN D3) 1,000 unit cap Take 1 Tab by mouth daily. Provider, Historical  
 
No Known Allergies Review of Systems: 
CONSTITUTIONAL: No fever, chills HEAD: No headache EYES: No visual loss ENT: No hearing loss SKIN: No rash CARDIOVASCULAR: No chest pain RESPIRATORY: No shortness of breath GASTROINTESTINAL: No nausea, vomiting GENITOURINARY: No excessive urination NEUROLOGICAL: No weakness MUSCULOSKELETAL: No muscle pain. No neck pain HEMATOLOGIC: No easy bleeding LYMPHATICS: No lymphedema. PSYCHIATRIC: No current depression ENDOCRINOLOGIC: No high sugars ALLERGIES: No history of asthma, hives, eczema or rhinitis. No results found for: HBA1C, WVY0XOYC, HGBE8, HAP2NOTQ, JBD3KPYO, DJB9VFMQ Immunization History Administered Date(s) Administered  Influenza High Dose Vaccine PF 10/12/2016, 11/29/2017, 12/05/2018  Influenza Vaccine 10/17/2013, 11/12/2014  Influenza Vaccine PF 09/10/2015  Influenza Vaccine Whole 09/06/2012  Pneumococcal Conjugate (PCV-13) 02/11/2015  Pneumococcal Polysaccharide (PPSV-23) 01/01/1993  TDAP Vaccine 05/31/2012  Tdap 05/31/2012  Zoster Vaccine, Live 11/13/2014 Body mass index is 19.48 kg/m². Current medications: 
Current Outpatient Medications Medication Sig Dispense Refill  lubiPROStone (AMITIZA) 8 mcg capsule Take 1 Cap by mouth daily (with breakfast). 90 Cap 3  
 levothyroxine (SYNTHROID) 50 mcg tablet TAKE ONE (1) TABLET BY MOUTH DAILY. 90 Tab 2  
 traMADol (ULTRAM) 50 mg tablet Take 1 Tab by mouth every eight (8) hours as needed for Pain. Max Daily Amount: 150 mg. 30 Tab 0  
 fluticasone (FLONASE) 50 mcg/actuation nasal spray USE 2 SPRAYS IN EACH NOSTRIL ONCE DAILY 3 Bottle 3  
 enalapril (VASOTEC) 10 mg tablet Take 1 Tab by mouth two (2) times a day. 180 Tab 3  
 simvastatin (ZOCOR) 40 mg tablet Take 1 Tab by mouth nightly. 90 Tab 3  
 lidocaine (LIDODERM) 5 % 1 Patch by TransDERmal route every twenty-four (24) hours. Apply patch to the affected area for 12 hours a day and remove for 12 hours a day. 1 Each 5  
 simethicone 125 mg chewable tablet Take 125 mg by mouth every six (6) hours as needed for Flatulence. 60 Tab 0  
 brimonidine (ALPHAGAN P) 0.1 % ophthalmic solution Administer 1 Drop to right eye every eight (8) hours.  esomeprazole (NEXIUM) 40 mg capsule Take  by mouth daily.  brimonidine (ALPHAGAN) 0.15 % ophthalmic solution Administer 1 Drop to right eye three (3) times daily.  VIT C/E/ZN/COPPR/LUTEIN/ZEAXAN (OCUVITE LUTEIN & ZEAXANTHIN PO) Take 1 Tab by mouth daily.  acetaminophen (TYLENOL) 500 mg tablet Take 650 mg by mouth every six (6) hours as needed for Pain.  clobetasol (TEMOVATE) 0.05 % external solution Apply 1 Drop to affected area two (2) times a day. apply to left upper arm twice a day  dorzolamide-timolol (COSOPT) 2-0.5 % ophthalmic solution Administer 1 Drop to both eyes two (2) times a day.  ketoconazole (NIZORAL) 2 % shampoo Apply 1 mL to affected area daily. apply to hair and let sit for 3 min and rinse out  TRAVATAN Z 0.004 % ophthalmic solution Administer 1 Drop to both eyes nightly.  aspirin 81 mg tablet Take 81 mg by mouth daily.  calcium-cholecalciferol, d3, (CALCIUM 600 + D) 600-125 mg-unit Tab Take 1 Tab by mouth two (2) times a day.  polyethylene glycol (MIRALAX) 17 gram packet Take 17 g by mouth daily. mixed with water or juice  Cholecalciferol, Vitamin D3, (VITAMIN D3) 1,000 unit cap Take 1 Tab by mouth daily. Objective:  
 
Physical Exam:  
 
Visit Vitals /82 (BP 1 Location: Left arm) Pulse 63 Temp 97.8 °F (36.6 °C) Resp 18 Wt 56.4 kg (124 lb 6.4 oz) SpO2 94% BMI 19.48 kg/m² General: well developed thin appearing Psych: cooperative. Pleasant Neuro: alert and oriented to person/place/situation. Otherwise nonfocal. 
Derm: Normal turgor for age, dry skin HEENT: Normocephalic, atraumatic. EOMI. Neck: Normal range of motion. Chest: Respirations nonlabored Cardio: RRR Abdomen: Soft, nondistended Lower extremities: No hemosiderrosis + significant varicosities Capillary refill <3 sec Right 1+ DP/PT Left 1+ DP/PT Thin friable skin. Left wound ecchymoses, fully granulated. [facility] Assessment/Plan:  
 
Ready to progress to collagen. Improving appropriately. Avoid any further trauma.

## 2019-06-28 NOTE — WOUND CARE
[address] [place] [address], [address] Phone: [phone] Fax: [phone] Patient: Fred Hector MRN: [id number]  SSN: [id number] YOB: 1923  Age: 80 y.o. Sex: female Return Appointment: 2 weeks with Clau Andrade MD 
 
  
Instructions:  
Right Ankle Wound: 
Cleanse wound with normal saline. Goran Davalos Apply Endoform-Cut to wound size and apply to wound bed, then cover Endoform with Hydrofera Ready, (apply spongy side down and shiny side facing out) cover right ankle with Coversite dressing. Change dressing 3 times a week by [facility]. Should you experience increased redness, swelling, pain, foul odor, size of wound(s), or have a temperature over 101 degrees please contact the [address] [place] [address] Road at [phone] or if after hours contact your primary care physician or go to the hospital emergency department. Signed By: Alanis Santiago RN   
 June 28, 2019

## 2019-06-28 NOTE — WOUND CARE
06/28/19 1316 Wound Ankle Right;Lateral  
Date First Assessed/Time First Assessed: 02/01/19 1450   Wound Type: Venous  Location: Ankle  Orientation: Right;Lateral  
Dressing Status  Clean, dry, and intact Dressing Type   
(endoform, hydrofera blue, bordered foam) Non-Pressure Injury Full thickness (subcut/muscle) Wound Length (cm) 2 cm Wound Width (cm) 2.4 cm Wound Depth (cm) 0.1 Wound Surface area (cm^2) 4.8 cm^2 Change in Wound Size % -269.23 Condition of Base Granulation;Epithelializing Tissue Type Red Tissue Type Percent Pink 40 Tissue Type Percent Red 60 Drainage Amount  Scant Drainage Color Serosanguinous Wound Odor None Periwound Skin Condition Intact Cleansing and Cleansing Agents  Normal saline Patient is taking Aspirin daily

## 2019-07-05 NOTE — PROGRESS NOTES
Wound Center Progress Note Patient: Ligia Hobbs MRN: [id number]  SSN: [id number] YOB: 1923  Age: 80 y.o. Sex: female Subjective: Chief Complaint: 
R ankle wound History of Present Illness:    
 
Wound Caused By: Jacintorolanda Desirae Associated Signs and Symptoms: drainage, infection Timing: constant Quality: wound Severity: full thickness Modifying Factors: age, overall infirmity Has been on Bax. Has HH assisting. Family very attentive but live in [place]. 2/22/2019 Has been performing dressings herself. Discharged from [facility]. Lives alone. Unable to drive. Extremely poor eyesight. Cannot see wound to place dressings. 3/29/2019 Continue to due well with wound on the right, but has new wound on the left. Cannot recall any trauma. Not on any blood thinners. 4/12/2019 Did well with endoform. Denies any new wounds. Minimal range. 4/26/2019 Feel right side may be healed. No more falls or other trauma. 5/10/2017 Does not recall any trauma, but has developed new wounds on the left side. No changes in medications. No recalled falls. 6/14/2019 No new issues. More recent wound is healing. Initial remains closed. No further trauma. 6/28/2019 Had some pain with the wraps since last visit. Had to cut off. Past Medical History:  
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  Constipation  Fatigue 10/31/2012  GERD (gastroesophageal reflux disease)  Glaucoma 1/3/2013  Hearing loss, sensorineural 1/3/2013  History of subdural hematoma (post traumatic) Right frontal after fall 6/2016  Hypercholesterolemia  Hyperlipemia 10/31/2012  Hyperlipidemia 10/31/2012  Hypertension 10/31/2012  Macular degeneration 1/3/2013  Mitral valve disorders(424.0) 10/31/2012  Osteoarthrosis, unspecified whether generalized or localized, lower leg 3/13/2013  Other malaise and fatigue 10/31/2012  Senile osteoporosis 10/31/2012  Skin cancer of scalp 2012  TIA (transient ischemic attack)  Unspecified hypothyroidism 10/31/2012 Past Surgical History:  
Procedure Laterality Date  HX APPENDECTOMY  HX COLONOSCOPY    
 HX MALIGNANT SKIN LESION EXCISION    
 scalp  RECTAL SENSATION TEST, BALLOON Family History Problem Relation Age of Onset  Hypertension Mother  Heart Disease Father  Breast Cancer Neg Hx Social History Tobacco Use  Smoking status: Never Smoker  Smokeless tobacco: Never Used Substance Use Topics  Alcohol use: No  
   
Prior to Admission medications Medication Sig Start Date End Date Taking? Authorizing Provider  
vits A,C,E/lutein/minerals (VISION FORMULA, WITH LUTEIN, PO) Take 1 Tab by mouth daily. Provider, Historical  
aspirin 81 mg chewable tablet Take 81 mg by mouth daily. Provider, Historical  
lubiPROStone (AMITIZA) 8 mcg capsule Take 1 Cap by mouth daily (with breakfast). 5/31/19   Earney Hammans, April U, MD  
levothyroxine (SYNTHROID) 50 mcg tablet TAKE ONE (1) TABLET BY MOUTH DAILY. 3/15/19   Earney Hammans, April U, MD  
traMADol (ULTRAM) 50 mg tablet Take 1 Tab by mouth every eight (8) hours as needed for Pain. Max Daily Amount: 150 mg. 12/27/18   Earney Hammans, April U, MD  
fluticasone [facility]) 50 mcg/actuation nasal spray USE 2 SPRAYS IN [facility] NOSTRIL ONCE DAILY 6/7/18   Earney Hammans, April U, MD  
enalapril (VASOTEC) 10 mg tablet Take 1 Tab by mouth two (2) times a day. 6/7/18   Earney Hammans, April U, MD  
simvastatin (ZOCOR) 40 mg tablet Take 1 Tab by mouth nightly. 6/7/18   Earney Hammans, April U, MD  
lidocaine (LIDODERM) 5 % 1 Patch by TransDERmal route every twenty-four (24) hours. Apply patch to the affected area for 12 hours a day and remove for 12 hours a day. 2/15/17   Earney Hammans, April U, MD  
simethicone 125 mg chewable tablet Take 125 mg by mouth every six (6) hours as needed for Flatulence.  1/30/17   Luke Mitchell MD  
 brimonidine (ALPHAGAN P) 0.1 % ophthalmic solution Administer 1 Drop to right eye every eight (8) hours. Provider, Historical  
esomeprazole (NEXIUM) 40 mg capsule Take  by mouth daily. Provider, Historical  
brimonidine (ALPHAGAN) 0.15 % ophthalmic solution Administer 1 Drop to right eye three (3) times daily. Provider, Historical  
VIT C/E/ZN/COPPR/LUTEIN/ZEAXAN (OCUVITE LUTEIN & ZEAXANTHIN PO) Take 1 Tab by mouth daily. Provider, Historical  
acetaminophen (TYLENOL) 500 mg tablet Take 650 mg by mouth every six (6) hours as needed for Pain. Provider, Historical  
clobetasol (TEMOVATE) 0.05 % external solution Apply 1 Drop to affected area two (2) times a day. apply to left upper arm twice a day 4/1/14   Provider, Historical  
dorzolamide-timolol (COSOPT) 2-0.5 % ophthalmic solution Administer 1 Drop to both eyes two (2) times a day. 2/2/14   Provider, Historical  
ketoconazole (NIZORAL) 2 % shampoo Apply 1 mL to affected area daily. apply to hair and let sit for 3 min and rinse out 4/1/14   Provider, Historical  
TRAVATAN Z 0.004 % ophthalmic solution Administer 1 Drop to right eye nightly. 2/2/14   Provider, Historical  
calcium-cholecalciferol, d3, (CALCIUM 600 + D) 600-125 mg-unit Tab Take 1 Tab by mouth two (2) times a day. Provider, Historical  
polyethylene glycol (MIRALAX) 17 gram packet Take 17 g by mouth daily. mixed with water or juice    Provider, Historical  
Cholecalciferol, Vitamin D3, (VITAMIN D3) 1,000 unit cap Take 1 Tab by mouth daily. Provider, Historical  
 
No Known Allergies Review of Systems: 
CONSTITUTIONAL: No fever, chills HEAD: No headache EYES: No visual loss ENT: No hearing loss SKIN: No rash CARDIOVASCULAR: No chest pain RESPIRATORY: No shortness of breath GASTROINTESTINAL: No nausea, vomiting GENITOURINARY: No excessive urination NEUROLOGICAL: No weakness MUSCULOSKELETAL: No muscle pain. No neck pain HEMATOLOGIC: No easy bleeding LYMPHATICS: No lymphedema. PSYCHIATRIC: No current depression ENDOCRINOLOGIC: No high sugars ALLERGIES: No history of asthma, hives, eczema or rhinitis. No results found for: HBA1C, DYC6HTOD, HGBE8, DMN0QMQN, AWS6IEBR, OVN0XASP Immunization History Administered Date(s) Administered  Influenza High Dose Vaccine PF 10/12/2016, 11/29/2017, 12/05/2018  Influenza Vaccine 10/17/2013, 11/12/2014  Influenza Vaccine PF 09/10/2015  Influenza Vaccine Whole 09/06/2012  Pneumococcal Conjugate (PCV-13) 02/11/2015  Pneumococcal Polysaccharide (PPSV-23) 01/01/1993  TDAP Vaccine 05/31/2012  Tdap 05/31/2012  Zoster Vaccine, Live 11/13/2014 Body mass index is 19.23 kg/m². Current medications: 
Current Outpatient Medications Medication Sig Dispense Refill  vits A,C,E/lutein/minerals (VISION FORMULA, WITH LUTEIN, PO) Take 1 Tab by mouth daily.  aspirin 81 mg chewable tablet Take 81 mg by mouth daily.  lubiPROStone (AMITIZA) 8 mcg capsule Take 1 Cap by mouth daily (with breakfast). 90 Cap 3  
 levothyroxine (SYNTHROID) 50 mcg tablet TAKE ONE (1) TABLET BY MOUTH DAILY. 90 Tab 2  
 traMADol (ULTRAM) 50 mg tablet Take 1 Tab by mouth every eight (8) hours as needed for Pain. Max Daily Amount: 150 mg. 30 Tab 0  
 fluticasone (FLONASE) 50 mcg/actuation nasal spray USE 2 SPRAYS IN EACH NOSTRIL ONCE DAILY 3 Bottle 3  
 enalapril (VASOTEC) 10 mg tablet Take 1 Tab by mouth two (2) times a day. 180 Tab 3  
 simvastatin (ZOCOR) 40 mg tablet Take 1 Tab by mouth nightly. 90 Tab 3  
 lidocaine (LIDODERM) 5 % 1 Patch by TransDERmal route every twenty-four (24) hours. Apply patch to the affected area for 12 hours a day and remove for 12 hours a day. 1 Each 5  
 simethicone 125 mg chewable tablet Take 125 mg by mouth every six (6) hours as needed for Flatulence. 60 Tab 0  
 brimonidine (ALPHAGAN P) 0.1 % ophthalmic solution Administer 1 Drop to right eye every eight (8) hours.  esomeprazole (NEXIUM) 40 mg capsule Take  by mouth daily.  brimonidine (ALPHAGAN) 0.15 % ophthalmic solution Administer 1 Drop to right eye three (3) times daily.  VIT C/E/ZN/COPPR/LUTEIN/ZEAXAN (OCUVITE LUTEIN & ZEAXANTHIN PO) Take 1 Tab by mouth daily.  acetaminophen (TYLENOL) 500 mg tablet Take 650 mg by mouth every six (6) hours as needed for Pain.  clobetasol (TEMOVATE) 0.05 % external solution Apply 1 Drop to affected area two (2) times a day. apply to left upper arm twice a day  dorzolamide-timolol (COSOPT) 2-0.5 % ophthalmic solution Administer 1 Drop to both eyes two (2) times a day.  ketoconazole (NIZORAL) 2 % shampoo Apply 1 mL to affected area daily. apply to hair and let sit for 3 min and rinse out  TRAVATAN Z 0.004 % ophthalmic solution Administer 1 Drop to right eye nightly.  calcium-cholecalciferol, d3, (CALCIUM 600 + D) 600-125 mg-unit Tab Take 1 Tab by mouth two (2) times a day.  polyethylene glycol (MIRALAX) 17 gram packet Take 17 g by mouth daily. mixed with water or juice  Cholecalciferol, Vitamin D3, (VITAMIN D3) 1,000 unit cap Take 1 Tab by mouth daily. Objective:  
 
Physical Exam:  
 
Visit Vitals /75 (BP 1 Location: Left arm) Pulse 64 Temp 97.6 °F (36.4 °C) Resp 18 Ht 5' 7\" (1.702 m) Wt 55.7 kg (122 lb 12.8 oz) BMI 19.23 kg/m² General: well developed thin appearing Psych: cooperative. Pleasant Neuro: alert and oriented to person/place/situation. Otherwise nonfocal. 
Derm: Normal turgor for age, dry skin HEENT: Normocephalic, atraumatic. EOMI. Neck: Normal range of motion. Chest: Respirations nonlabored Cardio: RRR Abdomen: Soft, nondistended Lower extremities: No hemosiderrosis + significant varicosities Capillary refill <3 sec Right 1+ DP/PT Left 1+ DP/PT Thin friable skin. Left wound ecchymoses, fully granulated. Niharika Casey Assessment/Plan: Some deterioration with tight wraps. Will lighten. Recheck in one week.

## 2019-07-05 NOTE — PROGRESS NOTES
Wound Center Progress Note Patient: Roel Mejía MRN: [id number]  SSN: [id number] YOB: 1923  Age: 80 y.o. Sex: female Subjective: Chief Complaint: 
R ankle wound History of Present Illness:    
 
Wound Caused By: Selwyn Vincent Associated Signs and Symptoms: drainage, infection Timing: constant Quality: wound Severity: full thickness Modifying Factors: age, overall infirmity Has been on Bax. Has HH assisting. Family very attentive but live in [place]. 2/22/2019 Has been performing dressings herself. Discharged from [facility]. Lives alone. Unable to drive. Extremely poor eyesight. Cannot see wound to place dressings. 3/29/2019 Continue to due well with wound on the right, but has new wound on the left. Cannot recall any trauma. Not on any blood thinners. 4/12/2019 Did well with endoform. Denies any new wounds. Minimal range. 4/26/2019 Feel right side may be healed. No more falls or other trauma. 5/10/2017 Does not recall any trauma, but has developed new wounds on the left side. No changes in medications. No recalled falls. 6/14/2019 No new issues. More recent wound is healing. Initial remains closed. No further trauma. Past Medical History:  
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  
 Skin cancer of scalp 2012  TIA (transient ischemic attack)  Unspecified hypothyroidism 10/31/2012 Past Surgical History:  
Procedure Laterality Date  HX APPENDECTOMY  HX COLONOSCOPY    
 HX MALIGNANT SKIN LESION EXCISION    
 scalp  RECTAL SENSATION TEST, BALLOON Family History Problem Relation Age of Onset  Hypertension Mother  Heart Disease Father  Breast Cancer Neg Hx Social History Tobacco Use  Smoking status: Never Smoker  Smokeless tobacco: Never Used Substance Use Topics  Alcohol use: No  
   
Prior to Admission medications Medication Sig Start Date End Date Taking? Authorizing Provider  
vits A,C,E/lutein/minerals (VISION FORMULA, WITH LUTEIN, PO) Take 1 Tab by mouth daily. Provider, Historical  
aspirin 81 mg chewable tablet Take 81 mg by mouth daily. Provider, Historical  
lubiPROStone (AMITIZA) 8 mcg capsule Take 1 Cap by mouth daily (with breakfast). 5/31/19   Louise Calhoun MD  
levothyroxine (SYNTHROID) 50 mcg tablet TAKE ONE (1) TABLET BY MOUTH DAILY. 3/15/19   Louise Calhoun MD  
traMADol (ULTRAM) 50 mg tablet Take 1 Tab by mouth every eight (8) hours as needed for Pain. Max Daily Amount: 150 mg. 12/27/18   Louise Calhoun MD  
fluticasone [facility]) 50 mcg/actuation nasal spray USE 2 SPRAYS IN [facility] NOSTRIL ONCE DAILY 6/7/18   Louise Calhoun MD  
enalapril (VASOTEC) 10 mg tablet Take 1 Tab by mouth two (2) times a day. 6/7/18   Louise Calhoun MD  
simvastatin (ZOCOR) 40 mg tablet Take 1 Tab by mouth nightly. 6/7/18   Louise Calhoun MD  
lidocaine (LIDODERM) 5 % 1 Patch by TransDERmal route every twenty-four (24) hours. Apply patch to the affected area for 12 hours a day and remove for 12 hours a day. 2/15/17   Louise Calhoun MD  
simethicone 125 mg chewable tablet Take 125 mg by mouth every six (6) hours as needed for Flatulence.  1/30/17   Jia Kinney MD  
brimonidine (ALPHAGAN P) 0.1 % ophthalmic solution Administer 1 Drop to right eye every eight (8) hours. Provider, Historical  
esomeprazole (NEXIUM) 40 mg capsule Take  by mouth daily. Provider, Historical  
brimonidine (ALPHAGAN) 0.15 % ophthalmic solution Administer 1 Drop to right eye three (3) times daily. Provider, Historical  
VIT C/E/ZN/COPPR/LUTEIN/ZEAXAN (OCUVITE LUTEIN & ZEAXANTHIN PO) Take 1 Tab by mouth daily. Provider, Historical  
acetaminophen (TYLENOL) 500 mg tablet Take 650 mg by mouth every six (6) hours as needed for Pain. Provider, Historical  
clobetasol (TEMOVATE) 0.05 % external solution Apply 1 Drop to affected area two (2) times a day. apply to left upper arm twice a day 4/1/14   Provider, Historical  
dorzolamide-timolol (COSOPT) 2-0.5 % ophthalmic solution Administer 1 Drop to both eyes two (2) times a day. 2/2/14   Provider, Historical  
ketoconazole (NIZORAL) 2 % shampoo Apply 1 mL to affected area daily. apply to hair and let sit for 3 min and rinse out 4/1/14   Provider, Historical  
TRAVATAN Z 0.004 % ophthalmic solution Administer 1 Drop to right eye nightly. 2/2/14   Provider, Historical  
calcium-cholecalciferol, d3, (CALCIUM 600 + D) 600-125 mg-unit Tab Take 1 Tab by mouth two (2) times a day. Provider, Historical  
polyethylene glycol (MIRALAX) 17 gram packet Take 17 g by mouth daily. mixed with water or juice    Provider, Historical  
Cholecalciferol, Vitamin D3, (VITAMIN D3) 1,000 unit cap Take 1 Tab by mouth daily. Provider, Historical  
 
No Known Allergies Review of Systems: 
CONSTITUTIONAL: No fever, chills HEAD: No headache EYES: No visual loss ENT: No hearing loss SKIN: No rash CARDIOVASCULAR: No chest pain RESPIRATORY: No shortness of breath GASTROINTESTINAL: No nausea, vomiting GENITOURINARY: No excessive urination NEUROLOGICAL: No weakness MUSCULOSKELETAL: No muscle pain. No neck pain HEMATOLOGIC: No easy bleeding LYMPHATICS: No lymphedema. PSYCHIATRIC: No current depression ENDOCRINOLOGIC: No high sugars ALLERGIES: No history of asthma, hives, eczema or rhinitis. No results found for: HBA1C, MPL6ZBPU, HGBE8, DRL4OEPI, PKE4MAZK, BHQ6KYVE Immunization History Administered Date(s) Administered  Influenza High Dose Vaccine PF 10/12/2016, 11/29/2017, 12/05/2018  Influenza Vaccine 10/17/2013, 11/12/2014  Influenza Vaccine PF 09/10/2015  Influenza Vaccine Whole 09/06/2012  Pneumococcal Conjugate (PCV-13) 02/11/2015  Pneumococcal Polysaccharide (PPSV-23) 01/01/1993  TDAP Vaccine 05/31/2012  Tdap 05/31/2012  Zoster Vaccine, Live 11/13/2014 There is no height or weight on file to calculate BMI. Current medications: 
Current Outpatient Medications Medication Sig Dispense Refill  vits A,C,E/lutein/minerals (VISION FORMULA, WITH LUTEIN, PO) Take 1 Tab by mouth daily.  aspirin 81 mg chewable tablet Take 81 mg by mouth daily.  lubiPROStone (AMITIZA) 8 mcg capsule Take 1 Cap by mouth daily (with breakfast). 90 Cap 3  
 levothyroxine (SYNTHROID) 50 mcg tablet TAKE ONE (1) TABLET BY MOUTH DAILY. 90 Tab 2  
 traMADol (ULTRAM) 50 mg tablet Take 1 Tab by mouth every eight (8) hours as needed for Pain. Max Daily Amount: 150 mg. 30 Tab 0  
 fluticasone (FLONASE) 50 mcg/actuation nasal spray USE 2 SPRAYS IN EACH NOSTRIL ONCE DAILY 3 Bottle 3  
 enalapril (VASOTEC) 10 mg tablet Take 1 Tab by mouth two (2) times a day. 180 Tab 3  
 simvastatin (ZOCOR) 40 mg tablet Take 1 Tab by mouth nightly. 90 Tab 3  
 lidocaine (LIDODERM) 5 % 1 Patch by TransDERmal route every twenty-four (24) hours. Apply patch to the affected area for 12 hours a day and remove for 12 hours a day. 1 Each 5  
 simethicone 125 mg chewable tablet Take 125 mg by mouth every six (6) hours as needed for Flatulence. 60 Tab 0  
 brimonidine (ALPHAGAN P) 0.1 % ophthalmic solution Administer 1 Drop to right eye every eight (8) hours.  esomeprazole (NEXIUM) 40 mg capsule Take  by mouth daily.  brimonidine (ALPHAGAN) 0.15 % ophthalmic solution Administer 1 Drop to right eye three (3) times daily.  VIT C/E/ZN/COPPR/LUTEIN/ZEAXAN (OCUVITE LUTEIN & ZEAXANTHIN PO) Take 1 Tab by mouth daily.  acetaminophen (TYLENOL) 500 mg tablet Take 650 mg by mouth every six (6) hours as needed for Pain.  clobetasol (TEMOVATE) 0.05 % external solution Apply 1 Drop to affected area two (2) times a day. apply to left upper arm twice a day  dorzolamide-timolol (COSOPT) 2-0.5 % ophthalmic solution Administer 1 Drop to both eyes two (2) times a day.  ketoconazole (NIZORAL) 2 % shampoo Apply 1 mL to affected area daily. apply to hair and let sit for 3 min and rinse out  TRAVATAN Z 0.004 % ophthalmic solution Administer 1 Drop to right eye nightly.  calcium-cholecalciferol, d3, (CALCIUM 600 + D) 600-125 mg-unit Tab Take 1 Tab by mouth two (2) times a day.  polyethylene glycol (MIRALAX) 17 gram packet Take 17 g by mouth daily. mixed with water or juice  Cholecalciferol, Vitamin D3, (VITAMIN D3) 1,000 unit cap Take 1 Tab by mouth daily. Objective:  
 
Physical Exam:  
 
Visit Vitals /79 (BP 1 Location: Left arm) Pulse 61 Temp 98 °F (36.7 °C) Resp 18 SpO2 99% General: well developed thin appearing Psych: cooperative. Pleasant Neuro: alert and oriented to person/place/situation. Otherwise nonfocal. 
Derm: Normal turgor for age, dry skin HEENT: Normocephalic, atraumatic. EOMI. Neck: Normal range of motion. Chest: Respirations nonlabored Cardio: RRR Abdomen: Soft, nondistended Lower extremities: No hemosiderrosis + significant varicosities Capillary refill <3 sec Right 1+ DP/PT Left 1+ DP/PT Thin friable skin. Left wound ecchymoses, fully granulated. Grazyna Mansfield Assessment/Plan:  
 
Ready to progress to collagen. Improving appropriately. Avoid any further trauma.

## 2019-07-12 NOTE — WOUND CARE
[address] [place] [address], [address] [address] Phone: [phone] Fax: [phone] Patient: Eagle Wetzel MRN: [id number]  SSN: [id number] YOB: 1923  Age: 80 y.o. Sex: female Return Appointment: 2 weeks with Donna Child MD 
 
Instructions: Right Ankle Wound: 
Cleanse wound with normal saline. Gregg Tucker Apply Endoform-Cut to wound size and apply to wound bed, then cover Endoform with Hydrofera Ready, (apply spongy side down and shiny side facing out) cover right ankle with Coversite dressing. Change dressing 3 times a week by [facility]. Should you experience increased redness, swelling, pain, foul odor, size of wound(s), or have a temperature over 101 degrees please contact the [address] [place] [address] Road at [phone] or if after hours contact your primary care physician or go to the hospital emergency department. Signed By: Bridgett Blandon RN   
 July 12, 2019

## 2019-07-12 NOTE — WOUND CARE
07/12/19 1322 Wound Ankle Right;Lateral  
Date First Assessed/Time First Assessed: 02/01/19 1450   Wound Type: Venous  Location: Ankle  Orientation: Right;Lateral  
Dressing Status  Clean, dry, and intact Dressing Type   
(hydrofera ready, bordered foam) Wound Length (cm) 1 cm Wound Width (cm) 1.3 cm Wound Depth (cm) 0.1 Wound Surface area (cm^2) 1.3 cm^2 Change in Wound Size % 0 Condition of Base Granulation;Epithelializing Tissue Type Percent Pink 50 Tissue Type Percent Red 50 Drainage Amount  Small Drainage Color Serous Wound Odor None Periwound Skin Condition Intact Cleansing and Cleansing Agents  Normal saline Patient is currently taking ASA

## 2019-07-12 NOTE — PROGRESS NOTES
Wound Center Progress Note Patient: Mar Barclay MRN: [id number]  SSN: [id number] YOB: 1923  Age: 80 y.o. Sex: female Subjective: Chief Complaint: 
R ankle wound History of Present Illness:    
 
Wound Caused By: Wade Chao Associated Signs and Symptoms: drainage, infection Timing: constant Quality: wound Severity: full thickness Modifying Factors: age, overall infirmity Has been on Bax. Has HH assisting. Family very attentive but live in [place]. 2/22/2019 Has been performing dressings herself. Discharged from [facility]. Lives alone. Unable to drive. Extremely poor eyesight. Cannot see wound to place dressings. 3/29/2019 Continue to due well with wound on the right, but has new wound on the left. Cannot recall any trauma. Not on any blood thinners. 4/12/2019 Did well with endoform. Denies any new wounds. Minimal range. 4/26/2019 Feel right side may be healed. No more falls or other trauma. 5/10/2017 Does not recall any trauma, but has developed new wounds on the left side. No changes in medications. No recalled falls. 6/14/2019 No new issues. More recent wound is healing. Initial remains closed. No further trauma. 6/28/2019 Had some pain with the wraps since last visit. Had to cut off. 
 
7/12/2019 Wraps tolerated since last. Not too tight. Feels have improved significantly. Past Medical History:  
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  
 Skin cancer of scalp 2012  TIA (transient ischemic attack)  Unspecified hypothyroidism 10/31/2012 Past Surgical History:  
Procedure Laterality Date  HX APPENDECTOMY  HX COLONOSCOPY    
 HX MALIGNANT SKIN LESION EXCISION    
 scalp  RECTAL SENSATION TEST, BALLOON Family History Problem Relation Age of Onset  Hypertension Mother  Heart Disease Father  Breast Cancer Neg Hx Social History Tobacco Use  Smoking status: Never Smoker  Smokeless tobacco: Never Used Substance Use Topics  Alcohol use: No  
   
Prior to Admission medications Medication Sig Start Date End Date Taking? Authorizing Provider  
vits A,C,E/lutein/minerals (VISION FORMULA, WITH LUTEIN, PO) Take 1 Tab by mouth daily. Provider, Historical  
aspirin 81 mg chewable tablet Take 81 mg by mouth daily. Provider, Historical  
lubiPROStone (AMITIZA) 8 mcg capsule Take 1 Cap by mouth daily (with breakfast). 5/31/19   Louise Dixon MD  
levothyroxine (SYNTHROID) 50 mcg tablet TAKE ONE (1) TABLET BY MOUTH DAILY. 3/15/19   Louise Dixon MD  
traMADol (ULTRAM) 50 mg tablet Take 1 Tab by mouth every eight (8) hours as needed for Pain. Max Daily Amount: 150 mg. 12/27/18   Louise Dixon MD  
fluticasone [facility]) 50 mcg/actuation nasal spray USE 2 SPRAYS IN [facility] NOSTRIL ONCE DAILY 6/7/18   Louise Dixon MD  
enalapril (VASOTEC) 10 mg tablet Take 1 Tab by mouth two (2) times a day. 6/7/18   Louise Dixon MD  
simvastatin (ZOCOR) 40 mg tablet Take 1 Tab by mouth nightly. 6/7/18   Louise Dixon MD  
lidocaine (LIDODERM) 5 % 1 Patch by TransDERmal route every twenty-four (24) hours. Apply patch to the affected area for 12 hours a day and remove for 12 hours a day.  2/15/17   Louise Dixon MD  
simethicone 125 mg chewable tablet Take 125 mg by mouth every six (6) hours as needed for Flatulence. 1/30/17   Toro Owens MD  
brimonidine (ALPHAGAN P) 0.1 % ophthalmic solution Administer 1 Drop to right eye every eight (8) hours. Provider, Historical  
esomeprazole (NEXIUM) 40 mg capsule Take  by mouth daily. Provider, Historical  
brimonidine (ALPHAGAN) 0.15 % ophthalmic solution Administer 1 Drop to right eye three (3) times daily. Provider, Historical  
VIT C/E/ZN/COPPR/LUTEIN/ZEAXAN (OCUVITE LUTEIN & ZEAXANTHIN PO) Take 1 Tab by mouth daily. Provider, Historical  
acetaminophen (TYLENOL) 500 mg tablet Take 650 mg by mouth every six (6) hours as needed for Pain. Provider, Historical  
clobetasol (TEMOVATE) 0.05 % external solution Apply 1 Drop to affected area two (2) times a day. apply to left upper arm twice a day 4/1/14   Provider, Historical  
dorzolamide-timolol (COSOPT) 2-0.5 % ophthalmic solution Administer 1 Drop to both eyes two (2) times a day. 2/2/14   Provider, Historical  
ketoconazole (NIZORAL) 2 % shampoo Apply 1 mL to affected area daily. apply to hair and let sit for 3 min and rinse out 4/1/14   Provider, Historical  
TRAVATAN Z 0.004 % ophthalmic solution Administer 1 Drop to right eye nightly. 2/2/14   Provider, Historical  
calcium-cholecalciferol, d3, (CALCIUM 600 + D) 600-125 mg-unit Tab Take 1 Tab by mouth two (2) times a day. Provider, Historical  
polyethylene glycol (MIRALAX) 17 gram packet Take 17 g by mouth daily. mixed with water or juice    Provider, Historical  
Cholecalciferol, Vitamin D3, (VITAMIN D3) 1,000 unit cap Take 1 Tab by mouth daily. Provider, Historical  
 
No Known Allergies Review of Systems: 
CONSTITUTIONAL: No fever, chills HEAD: No headache EYES: No visual loss ENT: No hearing loss SKIN: No rash CARDIOVASCULAR: No chest pain RESPIRATORY: No shortness of breath GASTROINTESTINAL: No nausea, vomiting GENITOURINARY: No excessive urination NEUROLOGICAL: No weakness MUSCULOSKELETAL: No muscle pain. No neck pain HEMATOLOGIC: No easy bleeding LYMPHATICS: No lymphedema. PSYCHIATRIC: No current depression ENDOCRINOLOGIC: No high sugars ALLERGIES: No history of asthma, hives, eczema or rhinitis. No results found for: HBA1C, VEM1XNLM, HGBE8, AZH0MQTP, JIV4MVDR, CIC8SJQX Immunization History Administered Date(s) Administered  Influenza High Dose Vaccine PF 10/12/2016, 11/29/2017, 12/05/2018  Influenza Vaccine 10/17/2013, 11/12/2014  Influenza Vaccine PF 09/10/2015  Influenza Vaccine Whole 09/06/2012  Pneumococcal Conjugate (PCV-13) 02/11/2015  Pneumococcal Polysaccharide (PPSV-23) 01/01/1993  TDAP Vaccine 05/31/2012  Tdap 05/31/2012  Zoster Vaccine, Live 11/13/2014 Body mass index is 18.79 kg/m². Current medications: 
Current Outpatient Medications Medication Sig Dispense Refill  vits A,C,E/lutein/minerals (VISION FORMULA, WITH LUTEIN, PO) Take 1 Tab by mouth daily.  aspirin 81 mg chewable tablet Take 81 mg by mouth daily.  lubiPROStone (AMITIZA) 8 mcg capsule Take 1 Cap by mouth daily (with breakfast). 90 Cap 3  
 levothyroxine (SYNTHROID) 50 mcg tablet TAKE ONE (1) TABLET BY MOUTH DAILY. 90 Tab 2  
 traMADol (ULTRAM) 50 mg tablet Take 1 Tab by mouth every eight (8) hours as needed for Pain. Max Daily Amount: 150 mg. 30 Tab 0  
 fluticasone (FLONASE) 50 mcg/actuation nasal spray USE 2 SPRAYS IN EACH NOSTRIL ONCE DAILY 3 Bottle 3  
 enalapril (VASOTEC) 10 mg tablet Take 1 Tab by mouth two (2) times a day. 180 Tab 3  
 simvastatin (ZOCOR) 40 mg tablet Take 1 Tab by mouth nightly. 90 Tab 3  
 lidocaine (LIDODERM) 5 % 1 Patch by TransDERmal route every twenty-four (24) hours. Apply patch to the affected area for 12 hours a day and remove for 12 hours a day. 1 Each 5  
 simethicone 125 mg chewable tablet Take 125 mg by mouth every six (6) hours as needed for Flatulence.  60 Tab 0  
  brimonidine (ALPHAGAN P) 0.1 % ophthalmic solution Administer 1 Drop to right eye every eight (8) hours.  esomeprazole (NEXIUM) 40 mg capsule Take  by mouth daily.  brimonidine (ALPHAGAN) 0.15 % ophthalmic solution Administer 1 Drop to right eye three (3) times daily.  VIT C/E/ZN/COPPR/LUTEIN/ZEAXAN (OCUVITE LUTEIN & ZEAXANTHIN PO) Take 1 Tab by mouth daily.  acetaminophen (TYLENOL) 500 mg tablet Take 650 mg by mouth every six (6) hours as needed for Pain.  clobetasol (TEMOVATE) 0.05 % external solution Apply 1 Drop to affected area two (2) times a day. apply to left upper arm twice a day  dorzolamide-timolol (COSOPT) 2-0.5 % ophthalmic solution Administer 1 Drop to both eyes two (2) times a day.  ketoconazole (NIZORAL) 2 % shampoo Apply 1 mL to affected area daily. apply to hair and let sit for 3 min and rinse out  TRAVATAN Z 0.004 % ophthalmic solution Administer 1 Drop to right eye nightly.  calcium-cholecalciferol, d3, (CALCIUM 600 + D) 600-125 mg-unit Tab Take 1 Tab by mouth two (2) times a day.  polyethylene glycol (MIRALAX) 17 gram packet Take 17 g by mouth daily. mixed with water or juice  Cholecalciferol, Vitamin D3, (VITAMIN D3) 1,000 unit cap Take 1 Tab by mouth daily. Objective:  
 
Physical Exam:  
 
Visit Vitals /75 (BP 1 Location: Right arm) Pulse 70 Temp 97 °F (36.1 °C) Resp 18 Ht 5' 7\" (1.702 m) Wt 54.4 kg (120 lb) SpO2 93% BMI 18.79 kg/m² General: well developed thin appearing Psych: cooperative. Pleasant Neuro: alert and oriented to person/place/situation. Otherwise nonfocal. 
Derm: Normal turgor for age, dry skin HEENT: Normocephalic, atraumatic. EOMI. Neck: Normal range of motion. Chest: Respirations nonlabored Cardio: RRR Abdomen: Soft, nondistended Lower extremities: No hemosiderrosis + significant varicosities Capillary refill <3 sec Right 1+ DP/PT Left 1+ DP/PT Thin friable skin. Left wound ecchymoses, fully granulated. Cici Griggs Assessment/Plan:  
 
Recovered from previous deterioration. No new issues. Much improved.

## 2019-07-26 NOTE — WOUND CARE
07/26/19 1330   Wound Elbow Left   Date First Assessed/Time First Assessed: 07/26/19 1331   Present on Hospital Admission: Yes  Primary Wound Type: Trauma  Location: Elbow  Wound Location Orientation: Left   Dressing Status Old drainage   Dressing Type   (gauze, stretch net)   Wound Length (cm) 4.5 cm   Wound Width (cm) 2.5 cm   Wound Depth (cm) 0.1 cm   Wound Volume (cm^3) 1.12 cm^3   Condition of Base Eschar;Pink   Tissue Type Percent Black 60 %   Tissue Type Percent Pink 40   Drainage Amount Small   Drainage Color Serosanguinous   Wound Odor None   Zeny-wound Assessment Intact   Cleansing and Cleansing Agents  Normal saline   Wound Ankle Right;Lateral   Date First Assessed/Time First Assessed: 02/01/19 1450   Wound Type: Venous  Location: Ankle  Orientation: Right;Lateral   Dressing Status  Clean, dry, and intact   Dressing Type    (endoform, hydrofera blue, bordered foam)   Wound Length (cm) 1 cm   Wound Width (cm) 1.2 cm   Wound Depth (cm) 0.1   Wound Surface area (cm^2) 1.2 cm^2   Change in Wound Size % 7.69   Condition of Base Epithelializing   Tissue Type Percent Pink 100   Drainage Amount  Small    Drainage Color Serous   Wound Odor None   Periwound Skin Condition Intact   Cleansing and Cleansing Agents  Normal saline           Patient is currently taking Aspirin

## 2019-07-26 NOTE — WOUND CARE
[address]  [address], [address]  Phone: [phone]  Fax: [phone]    Patient: Funmilayo Pendleton MRN: [id number]  SSN: [id number]    YOB: 1923  Age: 80 y.o. Sex: female       Return Appointment: 2 weeks with Agueda Lockhart MD    Instructions: Right Ankle Wound:  Cleanse wound with normal saline. Lucas Boyd Apply Endoform-Cut to wound size and apply to wound bed, then cover Endoform with Hydrofera Ready, (apply spongy side down and shiny side facing out) cover right ankle with Coversite dressing. Left elbow:  Clean with saline. Hydrofera Blue: Cut to wound size, moisten with saline, and apply to wound bed. Cover with either bordered foam or gauze and wrap with rolled gauze. Change dressings 3 times a week by [facility]. Should you experience increased redness, swelling, pain, foul odor, size of wound(s), or have a temperature over 101 degrees please contact the [address] [place] [address] Road at [phone] or if after hours contact your primary care physician or go to the hospital emergency department.     Signed By: Alberto Higgins RN     July 26, 2019

## 2019-08-02 NOTE — PROGRESS NOTES
Wound Center Progress Note Patient: Donovan Salas MRN: [id number]  SSN: [id number] YOB: 1923  Age: 80 y.o. Sex: female Subjective: Chief Complaint: 
R ankle wound History of Present Illness:    
 
Wound Caused By: Anola Limber Associated Signs and Symptoms: drainage, infection Timing: constant Quality: wound Severity: full thickness Modifying Factors: age, overall infirmity Has been on Bax. Has HH assisting. Family very attentive but live in [place]. 2/22/2019 Has been performing dressings herself. Discharged from [facility]. Lives alone. Unable to drive. Extremely poor eyesight. Cannot see wound to place dressings. 3/29/2019 Continue to due well with wound on the right, but has new wound on the left. Cannot recall any trauma. Not on any blood thinners. 4/12/2019 Did well with endoform. Denies any new wounds. Minimal range. 4/26/2019 Feel right side may be healed. No more falls or other trauma. 5/10/2017 Does not recall any trauma, but has developed new wounds on the left side. No changes in medications. No recalled falls. 6/14/2019 No new issues. More recent wound is healing. Initial remains closed. No further trauma. 6/28/2019 Had some pain with the wraps since last visit. Had to cut off. 
 
7/12/2019 Wraps tolerated since last. Not too tight. Feels have improved significantly. No new issues. Tolerating wraps. Past Medical History:  
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  
 Skin cancer of scalp 2012  TIA (transient ischemic attack)  Unspecified hypothyroidism 10/31/2012 Past Surgical History:  
Procedure Laterality Date  HX APPENDECTOMY  HX COLONOSCOPY    
 HX MALIGNANT SKIN LESION EXCISION    
 scalp  RECTAL SENSATION TEST, BALLOON Family History Problem Relation Age of Onset  Hypertension Mother  Heart Disease Father  Breast Cancer Neg Hx Social History Tobacco Use  Smoking status: Never Smoker  Smokeless tobacco: Never Used Substance Use Topics  Alcohol use: No  
   
Prior to Admission medications Medication Sig Start Date End Date Taking? Authorizing Provider  
vits A,C,E/lutein/minerals (VISION FORMULA, WITH LUTEIN, PO) Take 1 Tab by mouth daily. Provider, Historical  
aspirin 81 mg chewable tablet Take 81 mg by mouth daily. Provider, Historical  
lubiPROStone (AMITIZA) 8 mcg capsule Take 1 Cap by mouth daily (with breakfast). 5/31/19   Louise Tobin MD  
levothyroxine (SYNTHROID) 50 mcg tablet TAKE ONE (1) TABLET BY MOUTH DAILY. 3/15/19   Louise Tobin MD  
traMADol (ULTRAM) 50 mg tablet Take 1 Tab by mouth every eight (8) hours as needed for Pain. Max Daily Amount: 150 mg. 12/27/18   Louise Tobin MD  
fluticasone [facility]) 50 mcg/actuation nasal spray USE 2 SPRAYS IN [facility] NOSTRIL ONCE DAILY 6/7/18   Louise Tobin MD  
enalapril (VASOTEC) 10 mg tablet Take 1 Tab by mouth two (2) times a day. 6/7/18   Louise Tobin MD  
simvastatin (ZOCOR) 40 mg tablet Take 1 Tab by mouth nightly. 6/7/18   Louise Tobin MD  
lidocaine (LIDODERM) 5 % 1 Patch by TransDERmal route every twenty-four (24) hours. Apply patch to the affected area for 12 hours a day and remove for 12 hours a day.  2/15/17   Jose Rafael Kitchen MD  
 simethicone 125 mg chewable tablet Take 125 mg by mouth every six (6) hours as needed for Flatulence. 1/30/17   Murphy Cordova MD  
brimonidine (ALPHAGAN P) 0.1 % ophthalmic solution Administer 1 Drop to right eye every eight (8) hours. Provider, Historical  
esomeprazole (NEXIUM) 40 mg capsule Take  by mouth daily. Provider, Historical  
brimonidine (ALPHAGAN) 0.15 % ophthalmic solution Administer 1 Drop to right eye three (3) times daily. Provider, Historical  
VIT C/E/ZN/COPPR/LUTEIN/ZEAXAN (OCUVITE LUTEIN & ZEAXANTHIN PO) Take 1 Tab by mouth daily. Provider, Historical  
acetaminophen (TYLENOL) 500 mg tablet Take 650 mg by mouth every six (6) hours as needed for Pain. Provider, Historical  
clobetasol (TEMOVATE) 0.05 % external solution Apply 1 Drop to affected area two (2) times a day. apply to left upper arm twice a day 4/1/14   Provider, Historical  
dorzolamide-timolol (COSOPT) 2-0.5 % ophthalmic solution Administer 1 Drop to both eyes two (2) times a day. 2/2/14   Provider, Historical  
ketoconazole (NIZORAL) 2 % shampoo Apply 1 mL to affected area daily. apply to hair and let sit for 3 min and rinse out 4/1/14   Provider, Historical  
TRAVATAN Z 0.004 % ophthalmic solution Administer 1 Drop to right eye nightly. 2/2/14   Provider, Historical  
calcium-cholecalciferol, d3, (CALCIUM 600 + D) 600-125 mg-unit Tab Take 1 Tab by mouth two (2) times a day. Provider, Historical  
polyethylene glycol (MIRALAX) 17 gram packet Take 17 g by mouth daily. mixed with water or juice    Provider, Historical  
Cholecalciferol, Vitamin D3, (VITAMIN D3) 1,000 unit cap Take 1 Tab by mouth daily. Provider, Historical  
 
No Known Allergies Review of Systems: 
CONSTITUTIONAL: No fever, chills HEAD: No headache EYES: No visual loss ENT: No hearing loss SKIN: No rash CARDIOVASCULAR: No chest pain RESPIRATORY: No shortness of breath GASTROINTESTINAL: No nausea, vomiting GENITOURINARY: No excessive urination NEUROLOGICAL: No weakness MUSCULOSKELETAL: No muscle pain. No neck pain HEMATOLOGIC: No easy bleeding LYMPHATICS: No lymphedema. PSYCHIATRIC: No current depression ENDOCRINOLOGIC: No high sugars ALLERGIES: No history of asthma, hives, eczema or rhinitis. No results found for: HBA1C, NCY4DYOI, HGBE8, IFE8CKUG, CPX4EZOB, LSF4SRHI Immunization History Administered Date(s) Administered  Influenza High Dose Vaccine PF 10/12/2016, 11/29/2017, 12/05/2018  Influenza Vaccine 10/17/2013, 11/12/2014  Influenza Vaccine PF 09/10/2015  Influenza Vaccine Whole 09/06/2012  Pneumococcal Conjugate (PCV-13) 02/11/2015  Pneumococcal Polysaccharide (PPSV-23) 01/01/1993  TDAP Vaccine 05/31/2012  Tdap 05/31/2012  Zoster Vaccine, Live 11/13/2014 Body mass index is 18.99 kg/m². Current medications: 
Current Outpatient Medications Medication Sig Dispense Refill  vits A,C,E/lutein/minerals (VISION FORMULA, WITH LUTEIN, PO) Take 1 Tab by mouth daily.  aspirin 81 mg chewable tablet Take 81 mg by mouth daily.  lubiPROStone (AMITIZA) 8 mcg capsule Take 1 Cap by mouth daily (with breakfast). 90 Cap 3  
 levothyroxine (SYNTHROID) 50 mcg tablet TAKE ONE (1) TABLET BY MOUTH DAILY. 90 Tab 2  
 traMADol (ULTRAM) 50 mg tablet Take 1 Tab by mouth every eight (8) hours as needed for Pain. Max Daily Amount: 150 mg. 30 Tab 0  
 fluticasone (FLONASE) 50 mcg/actuation nasal spray USE 2 SPRAYS IN EACH NOSTRIL ONCE DAILY 3 Bottle 3  
 enalapril (VASOTEC) 10 mg tablet Take 1 Tab by mouth two (2) times a day. 180 Tab 3  
 simvastatin (ZOCOR) 40 mg tablet Take 1 Tab by mouth nightly. 90 Tab 3  
 lidocaine (LIDODERM) 5 % 1 Patch by TransDERmal route every twenty-four (24) hours. Apply patch to the affected area for 12 hours a day and remove for 12 hours a day.  1 Each 5  
 simethicone 125 mg chewable tablet Take 125 mg by mouth every six (6) hours as needed for Flatulence. 60 Tab 0  
 brimonidine (ALPHAGAN P) 0.1 % ophthalmic solution Administer 1 Drop to right eye every eight (8) hours.  esomeprazole (NEXIUM) 40 mg capsule Take  by mouth daily.  brimonidine (ALPHAGAN) 0.15 % ophthalmic solution Administer 1 Drop to right eye three (3) times daily.  VIT C/E/ZN/COPPR/LUTEIN/ZEAXAN (OCUVITE LUTEIN & ZEAXANTHIN PO) Take 1 Tab by mouth daily.  acetaminophen (TYLENOL) 500 mg tablet Take 650 mg by mouth every six (6) hours as needed for Pain.  clobetasol (TEMOVATE) 0.05 % external solution Apply 1 Drop to affected area two (2) times a day. apply to left upper arm twice a day  dorzolamide-timolol (COSOPT) 2-0.5 % ophthalmic solution Administer 1 Drop to both eyes two (2) times a day.  ketoconazole (NIZORAL) 2 % shampoo Apply 1 mL to affected area daily. apply to hair and let sit for 3 min and rinse out  TRAVATAN Z 0.004 % ophthalmic solution Administer 1 Drop to right eye nightly.  calcium-cholecalciferol, d3, (CALCIUM 600 + D) 600-125 mg-unit Tab Take 1 Tab by mouth two (2) times a day.  polyethylene glycol (MIRALAX) 17 gram packet Take 17 g by mouth daily. mixed with water or juice  Cholecalciferol, Vitamin D3, (VITAMIN D3) 1,000 unit cap Take 1 Tab by mouth daily. Objective:  
 
Physical Exam:  
 
Visit Vitals /90 (BP 1 Location: Right arm) Pulse 68 Temp 97.8 °F (36.6 °C) Resp 18 Ht 5' 7\" (1.702 m) Wt 55 kg (121 lb 4.1 oz) BMI 18.99 kg/m² General: well developed thin appearing Psych: cooperative. Pleasant Neuro: alert and oriented to person/place/situation. Otherwise nonfocal. 
Derm: Normal turgor for age, dry skin HEENT: Normocephalic, atraumatic. EOMI. Neck: Normal range of motion. Chest: Respirations nonlabored Cardio: RRR Abdomen: Soft, nondistended Lower extremities: No hemosiderrosis + significant varicosities Capillary refill <3 sec Right 1+ DP/PT Left 1+ DP/PT Thin friable skin. Left wound ecchymoses, fully granulated. Judithe [place]stone New wound left arm. Avoid trauma. Assessment/Plan:  
 
Recovered from previous deterioration. No new issues. Much improved.

## 2019-08-09 NOTE — WOUND CARE
08/09/19 1319 Wound Ankle Right;Lateral  
Date First Assessed/Time First Assessed: 02/01/19 1450   Wound Type: Venous  Location: Ankle  Orientation: Right;Lateral  
Dressing Status  Clean, dry, and intact Dressing Type   
(hydrofera ready, covrsite) Wound Length (cm) 1.1 cm Wound Width (cm) 1.3 cm Wound Depth (cm) 0.1 Wound Surface area (cm^2) 1.43 cm^2 Change in Wound Size % -10 Condition of Base Epithelializing Tissue Type Percent Red 100 Drainage Amount  Small Drainage Color Serous Wound Odor None Periwound Skin Condition Intact Cleansing and Cleansing Agents  Normal saline Patient is currently taking aspirin

## 2019-08-09 NOTE — WOUND CARE
[address] [place] [address] [address] Phone: [phone] Fax: [phone] Patient: Zenia Yusuf MRN: [id number]  SSN: [id number] YOB: 1923  Age: 80 y.o. Sex: female Return Appointment: 2 weeks with Korey Russ MD 
 
Instructions: Right ankle: 
Clean wound with saline. Endoform-Cut to wound size and apply to wound bed. Cover with bordered foam. 
Change 3 times weekly. Home health for dressing changes. Should you experience increased redness, swelling, pain, foul odor, size of wound(s), or have a temperature over 101 degrees please contact the [address] [place] [address] Road at [phone] or if after hours contact your primary care physician or go to the hospital emergency department. Signed By: Marv Delgado RN August 9, 2019

## 2019-08-16 NOTE — PROGRESS NOTES
Wound Center Progress Note    Patient: Fred Hector MRN: [id number]  SSN: [id number]    YOB: 1923  Age: 80 y.o. Sex: female      Subjective:     Chief Complaint:  R ankle wound      History of Present Illness:       Wound Caused By: Fall  Associated Signs and Symptoms: drainage, infection  Timing: constant  Quality: wound  Severity: full thickness  Modifying Factors: age, overall infirmity    Has been on Bax. Has HH assisting. Family very attentive but live in [place]. 2/22/2019 Has been performing dressings herself. Discharged from [facility]. Lives alone. Unable to drive. Extremely poor eyesight. Cannot see wound to place dressings. 3/29/2019  Continue to due well with wound on the right, but has new wound on the left. Cannot recall any trauma. Not on any blood thinners. 4/12/2019  Did well with endoform. Denies any new wounds. Minimal range. 4/26/2019  Feel right side may be healed. No more falls or other trauma. 5/10/2017  Does not recall any trauma, but has developed new wounds on the left side. No changes in medications. No recalled falls. 6/14/2019  No new issues. More recent wound is healing. Initial remains closed. No further trauma. 6/28/2019  Had some pain with the wraps since last visit. Had to cut off.    7/12/2019  Wraps tolerated since last. Not too tight. Feels have improved significantly. 8/9/2019  Patient and caregiver think much improved since last. No new issues. Tolerating wraps. No new issues. Left arm doing well. No falls.      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    Constipation     Fatigue 10/31/2012    GERD (gastroesophageal reflux disease)     Glaucoma 1/3/2013    Hearing loss, sensorineural 1/3/2013    History of subdural hematoma (post traumatic)     Right frontal after fall 6/2016    Hypercholesterolemia     Hyperlipemia 10/31/2012    Hyperlipidemia 10/31/2012    Hypertension 10/31/2012    Macular degeneration 1/3/2013    Mitral valve disorders(424.0) 10/31/2012    Osteoarthrosis, unspecified whether generalized or localized, lower leg 3/13/2013    Other malaise and fatigue 10/31/2012    Senile osteoporosis 10/31/2012    Skin cancer of scalp 2012    TIA (transient ischemic attack)     Unspecified hypothyroidism 10/31/2012      Past Surgical History:   Procedure Laterality Date    HX APPENDECTOMY      HX COLONOSCOPY      HX MALIGNANT SKIN LESION EXCISION      scalp    RECTAL SENSATION TEST, BALLOON       Family History   Problem Relation Age of Onset    Hypertension Mother     Heart Disease Father     Breast Cancer Neg Hx       Social History     Tobacco Use    Smoking status: Never Smoker    Smokeless tobacco: Never Used   Substance Use Topics    Alcohol use: No       Prior to Admission medications    Medication Sig Start Date End Date Taking? Authorizing Provider   vits A,C,E/lutein/minerals (VISION FORMULA, WITH LUTEIN, PO) Take 1 Tab by mouth daily. Provider, Historical   aspirin 81 mg chewable tablet Take 81 mg by mouth daily. Provider, Historical   lubiPROStone (AMITIZA) 8 mcg capsule Take 1 Cap by mouth daily (with breakfast). 5/31/19   Louise Bhardwaj MD   levothyroxine (SYNTHROID) 50 mcg tablet TAKE ONE (1) TABLET BY MOUTH DAILY. 3/15/19   Louise Bhardwaj MD   traMADol (ULTRAM) 50 mg tablet Take 1 Tab by mouth every eight (8) hours as needed for Pain. Max Daily Amount: 150 mg. 12/27/18   Louise Bhardwaj MD   fluticasone [facility]) 50 mcg/actuation nasal spray USE 2 SPRAYS IN [facility] NOSTRIL ONCE DAILY 6/7/18   Louise Bhardwaj MD   enalapril (VASOTEC) 10 mg tablet Take 1 Tab by mouth two (2) times a day. 6/7/18   Louise Bhardwaj MD   simvastatin (ZOCOR) 40 mg tablet Take 1 Tab by mouth nightly. 6/7/18   Louise Bhardwaj MD   lidocaine (LIDODERM) 5 % 1 Patch by TransDERmal route every twenty-four (24) hours.  Apply patch to the affected area for 12 hours a day and remove for 12 hours a day. 2/15/17   Karen LOERA MD   simethicone 125 mg chewable tablet Take 125 mg by mouth every six (6) hours as needed for Flatulence. 1/30/17   Jovanna Nair MD   brimonidine (ALPHAGAN P) 0.1 % ophthalmic solution Administer 1 Drop to right eye every eight (8) hours. Provider, Historical   esomeprazole (NEXIUM) 40 mg capsule Take  by mouth daily. Provider, Historical   brimonidine (ALPHAGAN) 0.15 % ophthalmic solution Administer 1 Drop to right eye three (3) times daily. Provider, Historical   VIT C/E/ZN/COPPR/LUTEIN/ZEAXAN (OCUVITE LUTEIN & ZEAXANTHIN PO) Take 1 Tab by mouth daily. Provider, Historical   acetaminophen (TYLENOL) 500 mg tablet Take 650 mg by mouth every six (6) hours as needed for Pain. Provider, Historical   clobetasol (TEMOVATE) 0.05 % external solution Apply 1 Drop to affected area two (2) times a day. apply to left upper arm twice a day 4/1/14   Provider, Historical   dorzolamide-timolol (COSOPT) 2-0.5 % ophthalmic solution Administer 1 Drop to both eyes two (2) times a day. 2/2/14   Provider, Historical   ketoconazole (NIZORAL) 2 % shampoo Apply 1 mL to affected area daily. apply to hair and let sit for 3 min and rinse out 4/1/14   Provider, Historical   TRAVATAN Z 0.004 % ophthalmic solution Administer 1 Drop to right eye nightly. 2/2/14   Provider, Historical   calcium-cholecalciferol, d3, (CALCIUM 600 + D) 600-125 mg-unit Tab Take 1 Tab by mouth two (2) times a day. Provider, Historical   polyethylene glycol (MIRALAX) 17 gram packet Take 17 g by mouth daily. mixed with water or juice    Provider, Historical   Cholecalciferol, Vitamin D3, (VITAMIN D3) 1,000 unit cap Take 1 Tab by mouth daily.     Provider, Historical     No Known Allergies     Review of Systems:  CONSTITUTIONAL: No fever, chills  HEAD: No headache  EYES: No visual loss  ENT: No hearing loss  SKIN: No rash  CARDIOVASCULAR: No chest pain  RESPIRATORY: No shortness of breath  GASTROINTESTINAL: No nausea, vomiting  GENITOURINARY: No excessive urination  NEUROLOGICAL: + mild le weakness  MUSCULOSKELETAL: No muscle pain. No neck pain  HEMATOLOGIC: No easy bleeding  LYMPHATICS: No lymphedema. PSYCHIATRIC: No current depression  ENDOCRINOLOGIC: No high sugars  ALLERGIES: No history of asthma, hives, eczema or rhinitis. No results found for: HBA1C, ELE9EPCH, HGBE8, TWH2BYWZ, QJC2QOWM, QIF0URVB     Immunization History   Administered Date(s) Administered    Influenza High Dose Vaccine PF 10/12/2016, 11/29/2017, 12/05/2018    Influenza Vaccine 10/17/2013, 11/12/2014    Influenza Vaccine PF 09/10/2015    Influenza Vaccine Whole 09/06/2012    Pneumococcal Conjugate (PCV-13) 02/11/2015    Pneumococcal Polysaccharide (PPSV-23) 01/01/1993    TDAP Vaccine 05/31/2012    Tdap 05/31/2012    Zoster Vaccine, Live 11/13/2014       Body mass index is 19.17 kg/m². Current medications:  Current Outpatient Medications   Medication Sig Dispense Refill    vits A,C,E/lutein/minerals (VISION FORMULA, WITH LUTEIN, PO) Take 1 Tab by mouth daily.  aspirin 81 mg chewable tablet Take 81 mg by mouth daily.  lubiPROStone (AMITIZA) 8 mcg capsule Take 1 Cap by mouth daily (with breakfast). 90 Cap 3    levothyroxine (SYNTHROID) 50 mcg tablet TAKE ONE (1) TABLET BY MOUTH DAILY. 90 Tab 2    traMADol (ULTRAM) 50 mg tablet Take 1 Tab by mouth every eight (8) hours as needed for Pain. Max Daily Amount: 150 mg. 30 Tab 0    fluticasone (FLONASE) 50 mcg/actuation nasal spray USE 2 SPRAYS IN EACH NOSTRIL ONCE DAILY 3 Bottle 3    enalapril (VASOTEC) 10 mg tablet Take 1 Tab by mouth two (2) times a day. 180 Tab 3    simvastatin (ZOCOR) 40 mg tablet Take 1 Tab by mouth nightly. 90 Tab 3    lidocaine (LIDODERM) 5 % 1 Patch by TransDERmal route every twenty-four (24) hours. Apply patch to the affected area for 12 hours a day and remove for 12 hours a day.  1 Each 5    simethicone 125 mg chewable tablet Take 125 mg by mouth every six (6) hours as needed for Flatulence. 60 Tab 0    brimonidine (ALPHAGAN P) 0.1 % ophthalmic solution Administer 1 Drop to right eye every eight (8) hours.  esomeprazole (NEXIUM) 40 mg capsule Take  by mouth daily.  brimonidine (ALPHAGAN) 0.15 % ophthalmic solution Administer 1 Drop to right eye three (3) times daily.  VIT C/E/ZN/COPPR/LUTEIN/ZEAXAN (OCUVITE LUTEIN & ZEAXANTHIN PO) Take 1 Tab by mouth daily.  acetaminophen (TYLENOL) 500 mg tablet Take 650 mg by mouth every six (6) hours as needed for Pain.  clobetasol (TEMOVATE) 0.05 % external solution Apply 1 Drop to affected area two (2) times a day. apply to left upper arm twice a day      dorzolamide-timolol (COSOPT) 2-0.5 % ophthalmic solution Administer 1 Drop to both eyes two (2) times a day.  ketoconazole (NIZORAL) 2 % shampoo Apply 1 mL to affected area daily. apply to hair and let sit for 3 min and rinse out      TRAVATAN Z 0.004 % ophthalmic solution Administer 1 Drop to right eye nightly.  calcium-cholecalciferol, d3, (CALCIUM 600 + D) 600-125 mg-unit Tab Take 1 Tab by mouth two (2) times a day.  polyethylene glycol (MIRALAX) 17 gram packet Take 17 g by mouth daily. mixed with water or juice      Cholecalciferol, Vitamin D3, (VITAMIN D3) 1,000 unit cap Take 1 Tab by mouth daily. Objective:     Physical Exam:     Visit Vitals  /81 (BP 1 Location: Left arm)   Pulse 61   Temp 97 °F (36.1 °C)   Resp 18   Ht 5' 7\" (1.702 m)   Wt 55.5 kg (122 lb 6.4 oz)   SpO2 96%   BMI 19.17 kg/m²       General: well developed thin appearing  Psych: cooperative. Pleasant  Neuro: alert and oriented to person/place/situation. Otherwise nonfocal.  Derm: Normal turgor for age, dry skin  HEENT: Normocephalic, atraumatic. EOMI. Neck: Normal range of motion.   Chest: Respirations nonlabored  Cardio: RRR  Abdomen: Soft, nondistended  Lower extremities:   No hemosiderrosis  + significant varicosities  Capillary refill <3 sec    Right  1+ DP/PT    Left  1+ DP/PT    Thin friable skin. Left wound ecchymoses, fully granulated. Left arm wound closed. Assessment/Plan:     Recovered from previous deterioration. No new issues. Much improved. Arm essentially healed. Continue LE wraps.

## 2019-08-23 NOTE — WOUND CARE
08/23/19 1323   Wound Ankle Right;Lateral   Date First Assessed/Time First Assessed: 02/01/19 1450   Wound Type: Venous  Location: Ankle  Orientation: Right;Lateral   Dressing Status  Clean, dry, and intact   Dressing Type    (Endoform, Bordered Foam)   Wound Length (cm) 1 cm   Wound Width (cm) 1.1 cm   Wound Depth (cm) 0.1   Wound Surface area (cm^2) 1.1 cm^2   Change in Wound Size % 15.38   Condition of Base Granulation   Tissue Type Percent Red 100   Drainage Amount  Small    Drainage Color Serous   Wound Odor None   Periwound Skin Condition Intact   Cleansing and Cleansing Agents  Normal saline; Soap and water   Dressing Type Applied   (Endoform, Bordered Foam)   Procedure Tolerated Well         Patient is on ANTICOAGULANT THERAPY: ASA 81 mg DAILY.

## 2019-08-23 NOTE — WOUND CARE
Clinic Level of Care Assessment    NAME:  Staci Ingram OF BIRTH:  5/29/1923 GENDER: female  MEDICAL RECORD NUMBER:  [id number]   DATE:  8/23/2019      Wound Count Document in [address] [place] [address]  Number of Wounds Assessed Points   No Wounds/Ulcers []   0   Less than Three Wounds/Ulcers [x]   1   3-6 Wounds/Ulcers []   2   Greater than 6 Wounds/Ulcers []   3     Ambulation Status Document in Daily Care/Safety tab  Status Definition Points   Independent Independently able to ambulate. Fully able (without any assistance) to get on/off exam table/chair. []   0   Minimal Physical Assistance Requires physical assistance of one person to ambulate and/or position patient to be examined. Includes necessary physical assistance to position lower extremities on/off stool. [x]   1   Moderate Physical Assistance Requires at least one staff member to physically assist patient in ambulating into treatment room, and on/off exam table. []   2   Full Assistance Requires assistance of at least two staff members to transfer patient into treatment room and/or on/off exam table/chair. \"Total Transfer\". []   3     Dressing Complexity Document in LDA and Write Appropriate Order  Complexity Definition Points   No Dressing  []   0   Simple Minimal, simple dressing. i.e. Band-aid, gauze, simple wrap. []   1   Intermediate Moderately complicated requiring licensed personnel to apply i.e. collagen matrix, ointments, gels, alginates. [x]   2   Complex Complicated requiring licensed personnel to apply dressings 6 or more wounds. []   3     Teaching Effort Document in Education Tab   Effort Definition Points   No Teaching  []   0   Simple Reinforce two or less topics. Document in Education navigator. [x]   1   Intermediate Reinforce three to five topics and/or one additional   new topic. Document in Education navigator. []   2   Complex Teach more than one new topic.  New patient information   packet reviewed and/or reinforce more than three topics. Document in Education navigator. HBO initial instruction. []   3       Patient Assessment and Planning  Planning Definition Points   Simple Multiple System Simple: Simple follow-up with routine assessment and planning. If Discharged, instructions and long term/follow-up care given to patient/caregiver. Discharged, instructions and/or After Visit Summary given to patient/caregiver and instructions completed. []   1   Intermediate Multiple System Intermediate: Contact with outside resources; i.e. Telephone calls to home health, [facility]. May include filling out forms and writing letters, arranging transportation, communication with insurance , vendors, etc.  Discharged, instructions and/or After Visit Summary given to patient/caregiver and instructions completed. [x]   2   Complex Multiple System Complex: Full, comprehensive assessment and planning. Follow the entire navigator under Wound Visit charting filling out each tab which includes OP Adm Database Screening, Education and CarePlan  HBO risk assessment completed. Discharged, instructions and/or After Visit Summary given to patient/caregiver and instructions completed.    []   3           Is this the Patient's First Visit to the [address] [place] [address] Road  No      Is this Patient Established @ [facility]  Yes             Clinical Level of Care      Points  0-2  Level 1 []     Points  3-5  Level 2 []     Points  6-9  Level 3 [x]     Points  10-12  Level 4 []     Points  13-15  Level 5 []       Electronically signed by Amy Mccartney RN on 8/23/2019 at 1:32 PM

## 2019-08-23 NOTE — WOUND CARE
[address]  [address], [address]   Phone: [phone]  Fax: [phone]    Patient: Joaquín Uriostegui MRN: [id number]  SSN: [id number]    YOB: 1923  Age: 80 y.o. Sex: female       Return Appointment: 3 weeks with Darling Rosas MD    Instructions: Right lateral ankle/lower leg:  Cleanse wound and periwound with wound cleanser or normal saline. Endoform-Cut to wound size and apply to wound bed. Cover with [facility] to change dressing 3x weekly. M, W, F until 09/11/19. Next appointment 09/13/19. Should you experience increased redness, swelling, pain, foul odor, size of wound(s), or have a temperature over 101 degrees please contact the [address] [place] [address] Road at [phone] or if after hours contact your primary care physician or go to the hospital emergency department.     Signed By: Cecily Salcedo RN     August 23, 2019

## 2019-08-23 NOTE — PROGRESS NOTES
Wound Center Progress Note    Patient: Jacquelyn Riley MRN: [id number]  SSN: [id number]    YOB: 1923  Age: 80 y.o. Sex: female      Subjective:     Chief Complaint:  Jacquelyn Riley is a 80 y.o. WHITE OR  female who presents withrt lower leg posterior wound of 5 months duration. History of Present Illness: The wound appears stable and is being dressed with Endo phoneand changed 3 times a week. 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    Constipation     Fatigue 10/31/2012    GERD (gastroesophageal reflux disease)     Glaucoma 1/3/2013    Hearing loss, sensorineural 1/3/2013    History of subdural hematoma (post traumatic)     Right frontal after fall 6/2016    Hypercholesterolemia     Hyperlipemia 10/31/2012    Hyperlipidemia 10/31/2012    Hypertension 10/31/2012    Macular degeneration 1/3/2013    Mitral valve disorders(424.0) 10/31/2012    Osteoarthrosis, unspecified whether generalized or localized, lower leg 3/13/2013    Other malaise and fatigue 10/31/2012    Senile osteoporosis 10/31/2012    Skin cancer of scalp 2012    TIA (transient ischemic attack)     Unspecified hypothyroidism 10/31/2012      Past Surgical History:   Procedure Laterality Date    HX APPENDECTOMY      HX COLONOSCOPY      HX MALIGNANT SKIN LESION EXCISION      scalp    RECTAL SENSATION TEST, BALLOON       Family History   Problem Relation Age of Onset    Hypertension Mother     Heart Disease Father     Breast Cancer Neg Hx       Social History     Tobacco Use    Smoking status: Never Smoker    Smokeless tobacco: Never Used   Substance Use Topics    Alcohol use: No       Prior to Admission medications    Medication Sig Start Date End Date Taking? Authorizing Provider   vits A,C,E/lutein/minerals (VISION FORMULA, WITH LUTEIN, PO) Take 1 Tab by mouth daily. Provider, Historical   aspirin 81 mg chewable tablet Take 81 mg by mouth daily. Provider, Historical   lubiPROStone (AMITIZA) 8 mcg capsule Take 1 Cap by mouth daily (with breakfast). 5/31/19   Louise Murillo MD   levothyroxine (SYNTHROID) 50 mcg tablet TAKE ONE (1) TABLET BY MOUTH DAILY. 3/15/19   Louise Murillo MD   traMADol (ULTRAM) 50 mg tablet Take 1 Tab by mouth every eight (8) hours as needed for Pain. Max Daily Amount: 150 mg. 12/27/18   Louise Murillo MD   fluticasone [facility]) 50 mcg/actuation nasal spray USE 2 SPRAYS IN [facility] NOSTRIL ONCE DAILY 6/7/18   Louise Murillo MD   enalapril (VASOTEC) 10 mg tablet Take 1 Tab by mouth two (2) times a day. 6/7/18   Louise Murillo MD   simvastatin (ZOCOR) 40 mg tablet Take 1 Tab by mouth nightly. 6/7/18   Louise Murillo MD   lidocaine (LIDODERM) 5 % 1 Patch by TransDERmal route every twenty-four (24) hours. Apply patch to the affected area for 12 hours a day and remove for 12 hours a day. 2/15/17   Louise Murillo MD   simethicone 125 mg chewable tablet Take 125 mg by mouth every six (6) hours as needed for Flatulence. 1/30/17   Saundra Mixon MD   brimonidine (ALPHAGAN P) 0.1 % ophthalmic solution Administer 1 Drop to right eye every eight (8) hours. Provider, Historical   esomeprazole (NEXIUM) 40 mg capsule Take  by mouth daily. Provider, Historical   brimonidine (ALPHAGAN) 0.15 % ophthalmic solution Administer 1 Drop to right eye three (3) times daily. Provider, Historical   VIT C/E/ZN/COPPR/LUTEIN/ZEAXAN (OCUVITE LUTEIN & ZEAXANTHIN PO) Take 1 Tab by mouth daily. Provider, Historical   acetaminophen (TYLENOL) 500 mg tablet Take 650 mg by mouth every six (6) hours as needed for Pain. Provider, Historical   clobetasol (TEMOVATE) 0.05 % external solution Apply 1 Drop to affected area two (2) times a day.  apply to left upper arm twice a day 4/1/14   Provider, Historical   dorzolamide-timolol (COSOPT) 2-0.5 % ophthalmic solution Administer 1 Drop to both eyes two (2) times a day. 2/2/14   Provider, Historical   ketoconazole (NIZORAL) 2 % shampoo Apply 1 mL to affected area daily. apply to hair and let sit for 3 min and rinse out 4/1/14   Provider, Historical   TRAVATAN Z 0.004 % ophthalmic solution Administer 1 Drop to right eye nightly. 2/2/14   Provider, Historical   calcium-cholecalciferol, d3, (CALCIUM 600 + D) 600-125 mg-unit Tab Take 1 Tab by mouth two (2) times a day. Provider, Historical   polyethylene glycol (MIRALAX) 17 gram packet Take 17 g by mouth daily. mixed with water or juice    Provider, Historical   Cholecalciferol, Vitamin D3, (VITAMIN D3) 1,000 unit cap Take 1 Tab by mouth daily. Provider, Historical     No Known Allergies     Review of Systems:  A comprehensive review of systems was negative except for that written in the History of Present Illness. No results found for: HBA1C, JDQ8BPLT, HGBE8, XQQ6WWMH, EIC0HPRI     Immunization History   Administered Date(s) Administered    Influenza High Dose Vaccine PF 10/12/2016, 11/29/2017, 12/05/2018    Influenza Vaccine 10/17/2013, 11/12/2014    Influenza Vaccine PF 09/10/2015    Influenza Vaccine Whole 09/06/2012    Pneumococcal Conjugate (PCV-13) 02/11/2015    Pneumococcal Polysaccharide (PPSV-23) 01/01/1993    TDAP Vaccine 05/31/2012    Tdap 05/31/2012    Zoster Vaccine, Live 11/13/2014       Body mass index is 19.42 kg/m². Counseling regarding nutrition done: No     Current medications:  Current Outpatient Medications   Medication Sig Dispense Refill    vits A,C,E/lutein/minerals (VISION FORMULA, WITH LUTEIN, PO) Take 1 Tab by mouth daily.  aspirin 81 mg chewable tablet Take 81 mg by mouth daily.  lubiPROStone (AMITIZA) 8 mcg capsule Take 1 Cap by mouth daily (with breakfast).  90 Cap 3    levothyroxine (SYNTHROID) 50 mcg tablet TAKE ONE (1) TABLET BY MOUTH DAILY. 90 Tab 2    traMADol (ULTRAM) 50 mg tablet Take 1 Tab by mouth every eight (8) hours as needed for Pain. Max Daily Amount: 150 mg. 30 Tab 0    fluticasone (FLONASE) 50 mcg/actuation nasal spray USE 2 SPRAYS IN EACH NOSTRIL ONCE DAILY 3 Bottle 3    enalapril (VASOTEC) 10 mg tablet Take 1 Tab by mouth two (2) times a day. 180 Tab 3    simvastatin (ZOCOR) 40 mg tablet Take 1 Tab by mouth nightly. 90 Tab 3    lidocaine (LIDODERM) 5 % 1 Patch by TransDERmal route every twenty-four (24) hours. Apply patch to the affected area for 12 hours a day and remove for 12 hours a day. 1 Each 5    simethicone 125 mg chewable tablet Take 125 mg by mouth every six (6) hours as needed for Flatulence. 60 Tab 0    brimonidine (ALPHAGAN P) 0.1 % ophthalmic solution Administer 1 Drop to right eye every eight (8) hours.  esomeprazole (NEXIUM) 40 mg capsule Take  by mouth daily.  brimonidine (ALPHAGAN) 0.15 % ophthalmic solution Administer 1 Drop to right eye three (3) times daily.  VIT C/E/ZN/COPPR/LUTEIN/ZEAXAN (OCUVITE LUTEIN & ZEAXANTHIN PO) Take 1 Tab by mouth daily.  acetaminophen (TYLENOL) 500 mg tablet Take 650 mg by mouth every six (6) hours as needed for Pain.  clobetasol (TEMOVATE) 0.05 % external solution Apply 1 Drop to affected area two (2) times a day. apply to left upper arm twice a day      dorzolamide-timolol (COSOPT) 2-0.5 % ophthalmic solution Administer 1 Drop to both eyes two (2) times a day.  ketoconazole (NIZORAL) 2 % shampoo Apply 1 mL to affected area daily. apply to hair and let sit for 3 min and rinse out      TRAVATAN Z 0.004 % ophthalmic solution Administer 1 Drop to right eye nightly.  calcium-cholecalciferol, d3, (CALCIUM 600 + D) 600-125 mg-unit Tab Take 1 Tab by mouth two (2) times a day.  polyethylene glycol (MIRALAX) 17 gram packet Take 17 g by mouth daily.   mixed with water or juice      Cholecalciferol, Vitamin D3, (VITAMIN D3) 1,000 unit cap Take 1 Tab by mouth daily. Objective:     Physical Exam:     Visit Vitals  Ht 5' 7\" (1.702 m)   Wt 56.2 kg (124 lb)   BMI 19.42 kg/m²       General: well developed, well nourished, pleasant , NAD. Hygiene good  Psych: cooperative. Pleasant. No anxiety or depression. Normal mood and affect. Neuro: alert and oriented to person/place/situation. Otherwise nonfocal.  Derm: Normal turgor for age, dry skin  HEENT: Normocephalic, atraumatic. EOMI. Conjunctiva clear. No scleral icterus. Neck: Normal range of motion. No masses. Chest: Good air entry bilaterally. Respirations nonlabored  Cardio: Normal heart sounds,no rubs, murmurs or gallops  Abdomen: Soft, nontender, nondistended, normoactive bowel sounds  Lower extremities: color normal; temperature normal. Hair growth is not present. Calves are supple, nontender, approximately equally sized in comparison.  Capillary refill <3 sec            Ulcer Description:   Wound Ankle Right;Lateral (Active)   Dressing Status  Clean, dry, and intact 8/23/2019  1:23 PM   Dressing Type  Open to air 2/1/2019  2:51 PM   Non-Pressure Injury Full thickness (subcut/muscle) 6/28/2019  1:16 PM   Wound Length (cm) 1 cm 8/23/2019  1:23 PM   Wound Width (cm) 1.1 cm 8/23/2019  1:23 PM   Wound Depth (cm) 0.1 8/23/2019  1:23 PM   Wound Surface area (cm^2) 1.1 cm^2 8/23/2019  1:23 PM   Change in Wound Size % 15.38 8/23/2019  1:23 PM   Condition of Base Granulation 8/23/2019  1:23 PM   Condition of Edges Open 2/22/2019  1:27 PM   Tissue Type Red 6/28/2019  1:16 PM   Tissue Type Percent Pink 100 7/26/2019  1:30 PM   Tissue Type Percent Red 100 8/23/2019  1:23 PM   Tissue Type Percent Yellow 5 2/1/2019  2:51 PM   Tissue Type Percent Other (comment) 10 2/22/2019  1:27 PM   Drainage Amount  Small  8/23/2019  1:23 PM   Drainage Color Serous 8/23/2019  1:23 PM   Wound Odor None 8/23/2019  1:23 PM   Periwound Skin Condition Intact 8/23/2019  1:23 PM   Cleansing and Cleansing Agents Normal saline; Soap and water 8/23/2019  1:23 PM   Procedure Tolerated Well 8/23/2019  1:23 PM   Number of days: 203       [REMOVED] Wound Ankle Lateral;Right (Removed)   Number of days: 167       [REMOVED] Wound Knee Anterior; Left (Removed)   Number of days: 52       [REMOVED] Wound Face (Removed)   Number of days: 17       [REMOVED] Wound Knee Right (Removed)   Number of days: 33       [REMOVED] Wound Knee Right (Removed)   Number of days: 0       [REMOVED] Wound Head Right (Removed)   Number of days: 1       [REMOVED] Wound Calf Right (Removed)   Number of days: 4       [REMOVED] Wound Leg Upper Right (Removed)   Number of days: 20       [REMOVED] Wound Ankle Right (Removed)   Number of days: 84       [REMOVED] Wound Leg Lower Anterior; Left (Removed)   Dressing Status  Clean, dry, and intact 4/26/2019  1:32 PM   Dressing Type  Gauze 3/29/2019  2:51 PM   Wound Length (cm) 0 cm 4/26/2019  1:32 PM   Wound Width (cm) 0 cm 4/26/2019  1:32 PM   Wound Depth (cm) 0 4/26/2019  1:32 PM   Wound Surface area (cm^2) 0 cm^2 4/26/2019  1:32 PM   Change in Wound Size % 100 4/26/2019  1:32 PM   Condition of Base Other (comment) 3/29/2019  2:51 PM   Epithelialization (%) 100 4/26/2019  1:32 PM   Tissue Type Percent Pink 100 4/26/2019  1:32 PM   Tissue Type Percent Red 100 4/12/2019  1:20 PM   Drainage Amount  None 4/26/2019  1:32 PM   Drainage Color Serosanguinous 4/12/2019  1:20 PM   Wound Odor None 4/26/2019  1:32 PM   Periwound Skin Condition Ecchymosis;Edematous 4/12/2019  1:20 PM   Cleansing and Cleansing Agents  Soap and water 4/26/2019  1:32 PM   Procedure Tolerated Well 4/12/2019  1:20 PM   Number of days: 28       [REMOVED] Wound Leg lower Left;Posterior (Removed)   Dressing Status Clean, dry, and intact 6/14/2019  1:22 PM   Wound Length (cm) 0 cm 6/14/2019  1:22 PM   Wound Width (cm) 0 cm 6/14/2019  1:22 PM   Wound Depth (cm) 0 cm 6/14/2019  1:22 PM   Wound Volume (cm^3) 0 cm^3 6/14/2019  1:22 PM   Condition of Base Epithelializing 6/14/2019  1:22 PM   Epithelialization (%) 100 6/14/2019  1:22 PM   Tissue Type Percent Maroon/Purple 100 % 5/10/2019  1:35 PM   Tissue Type Percent Pink 100 6/14/2019  1:22 PM   Tissue Type Percent Red 20 6/7/2019  1:23 PM   Tissue Type Percent Yellow 20 5/24/2019  1:13 PM   Drainage Amount None 6/14/2019  1:22 PM   Drainage Color Serous 6/7/2019  1:23 PM   Wound Odor None 6/14/2019  1:22 PM   Zeny-wound Assessment Intact 6/14/2019  1:22 PM   Cleansing and Cleansing Agents  Normal saline 6/14/2019  1:22 PM   Dressing Changed Changed/New 6/14/2019  1:22 PM   Procedure Tolerated Well 6/7/2019  1:23 PM   Number of days: 35       [REMOVED] Wound Elbow Left; Lower unapproximated skin tear 07/17/19 (Removed)   Number of days: 21       [REMOVED] Wound Elbow Left (Removed)   Dressing Status Old drainage 7/26/2019  1:30 PM   Wound Length (cm) 4.5 cm 7/26/2019  1:30 PM   Wound Width (cm) 2.5 cm 7/26/2019  1:30 PM   Wound Depth (cm) 0.1 cm 7/26/2019  1:30 PM   Wound Volume (cm^3) 1.12 cm^3 7/26/2019  1:30 PM   Condition of Base [place] 7/26/2019  1:30 PM   Tissue Type Percent Black 60 % 7/26/2019  1:30 PM   Tissue Type Percent Pink 40 7/26/2019  1:30 PM   Drainage Amount Small 7/26/2019  1:30 PM   Drainage Color Serosanguinous 7/26/2019  1:30 PM   Wound Odor None 7/26/2019  1:30 PM   Zeny-wound Assessment Intact 7/26/2019  1:30 PM   Cleansing and Cleansing Agents  Normal saline 7/26/2019  1:30 PM   Number of days: 12       [REMOVED] Wound Wrist Left 07/29/19 (Removed)   Number of days: 7         Data Review:   No results found for this or any previous visit (from the past 24 hour(s)). Assessment:     80 y.o. female with L lower leg posterior combined ulcer.     Problem List  Date Reviewed: 3/13/2019          Codes Class Noted    Wound of right leg ICD-10-CM: W27.851L  ICD-9-CM: 891.0  1/22/2019        Osteoarthrosis, unspecified whether generalized or localized, lower leg ICD-10-CM: M17.10  ICD-9-CM: 715.96  3/13/2013        TIA (transient ischemic attack) ICD-10-CM: G45.9  ICD-9-CM: 435.9  1/3/2013    Overview Signed 1/3/2013  9:26 AM by Morris Heath MD     2001             CN VI palsy (Chronic) ICD-10-CM: H49.20  ICD-9-CM: 378.54  1/3/2013    Overview Signed 1/3/2013  9:03 AM by Morris Heath MD     Spring 2012- Dr. Bishop Graham degeneration (Chronic) ICD-10-CM: H35.30  ICD-9-CM: 362.50  1/3/2013    Overview Signed 1/3/2013  9:05 AM by MD Dr. Mich Hutton             Glaucoma (Chronic) ICD-10-CM: H40.9  ICD-9-CM: 365.9  1/3/2013    Overview Signed 1/3/2013  9:05 AM by MD Dr. Mich Hutton             Hearing loss, sensorineural (Chronic) ICD-10-CM: H90.5  ICD-9-CM: 389.10  1/3/2013        Senile osteoporosis (Chronic) ICD-10-CM: M81.0  ICD-9-CM: 733.01  10/31/2012    Overview Signed 1/3/2013  9:04 AM by MD Dr. Ivan Hutton             Hyperlipidemia (Chronic) ICD-10-CM: E78.5  ICD-9-CM: 272.4  10/31/2012        Hypertension (Chronic) ICD-10-CM: I10  ICD-9-CM: 401.9  10/31/2012        Fatigue ICD-10-CM: R53.83  ICD-9-CM: 780.79  10/31/2012        Pain in joint, multiple sites ICD-10-CM: M25.50  ICD-9-CM: 719.49  10/31/2012        Other malaise and fatigue ICD-10-CM: R53.81, R53.83  ICD-9-CM: 780.79  10/31/2012        Hypothyroidism (Chronic) ICD-10-CM: E03.9  ICD-9-CM: 244.9  10/31/2012        Mitral regurgitation (Chronic) ICD-10-CM: I34.0  ICD-9-CM: 424.0  10/31/2012    Overview Signed 1/3/2013  9:08 AM by MD Dr. Moi Hutton                    Needs:  Good local wound care  Edema management  Nutrition optimization  Good Diabetic control    Plan:     Orders Placed This Encounter    WOUND CARE, DRESSING CHANGE     Right lateral ankle/lower leg:  Cleanse wound and periwound with wound cleanser or normal saline. Endoform-Cut to wound size and apply to wound bed.   Cover with [facility] to change dressing 3x weekly. M, W, F until 09/11/19. Next appointment 09/13/19. Standing Status:   Standing     Number of Occurrences:   1        Patient understood and agrees with plan. Questions answered. Follow-up Information    None            Any procedures done today in the [address] [facility],[address] are documented in a separate note in [address] [place] [address] and made part of this record by reference.      Dictated using voice recognition software; proofread, but unrecognized errors may exist.    Signed By: Gifty Waite MD     August 23, 2019

## 2019-09-13 NOTE — PROGRESS NOTES
Wound Center Progress Note    Patient: Joaquín Uriostegui MRN: [id number]  SSN: [id number]    YOB: 1923  Age: 80 y.o. Sex: female      Subjective:     Chief Complaint:  Joaquín Uriostegui is a 80 y.o. WHITE OR  female who presents with R lower leg lateral wound of 10 months duration. History of Present Illness:     Small and clean. It is dressed withN Xeroform and it is is border foam.  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    Constipation     Fatigue 10/31/2012    GERD (gastroesophageal reflux disease)     Glaucoma 1/3/2013    Hearing loss, sensorineural 1/3/2013    History of subdural hematoma (post traumatic)     Right frontal after fall 6/2016    Hypercholesterolemia     Hyperlipemia 10/31/2012    Hyperlipidemia 10/31/2012    Hypertension 10/31/2012    Macular degeneration 1/3/2013    Mitral valve disorders(424.0) 10/31/2012    Osteoarthrosis, unspecified whether generalized or localized, lower leg 3/13/2013    Other malaise and fatigue 10/31/2012    Senile osteoporosis 10/31/2012    Skin cancer of scalp 2012    TIA (transient ischemic attack)     Unspecified hypothyroidism 10/31/2012      Past Surgical History:   Procedure Laterality Date    HX APPENDECTOMY      HX COLONOSCOPY      HX MALIGNANT SKIN LESION EXCISION      scalp    RECTAL SENSATION TEST, BALLOON       Family History   Problem Relation Age of Onset    Hypertension Mother     Heart Disease Father     Breast Cancer Neg Hx       Social History     Tobacco Use    Smoking status: Never Smoker    Smokeless tobacco: Never Used   Substance Use Topics    Alcohol use: No       Prior to Admission medications    Medication Sig Start Date End Date Taking?  Authorizing Provider   simvastatin (ZOCOR) 40 mg tablet TAKE <1> TABLET BY MOUTH NIGHTLY 8/27/19   Louise Schneider MD   vits A,C,E/lutein/minerals (VISION FORMULA, WITH LUTEIN, PO) Take 1 Tab by mouth daily. Provider, Historical   aspirin 81 mg chewable tablet Take 81 mg by mouth daily. Provider, Historical   lubiPROStone (AMITIZA) 8 mcg capsule Take 1 Cap by mouth daily (with breakfast). 5/31/19   Louise Schneider MD   levothyroxine (SYNTHROID) 50 mcg tablet TAKE ONE (1) TABLET BY MOUTH DAILY. 3/15/19   Louise Schneider MD   traMADol (ULTRAM) 50 mg tablet Take 1 Tab by mouth every eight (8) hours as needed for Pain. Max Daily Amount: 150 mg. 12/27/18   Louise Schneider MD   fluticasone [facility]) 50 mcg/actuation nasal spray USE 2 SPRAYS IN [facility] NOSTRIL ONCE DAILY 6/7/18   Louise Schneider MD   enalapril (VASOTEC) 10 mg tablet Take 1 Tab by mouth two (2) times a day. 6/7/18   Louise Schneider MD   lidocaine (LIDODERM) 5 % 1 Patch by TransDERmal route every twenty-four (24) hours. Apply patch to the affected area for 12 hours a day and remove for 12 hours a day. 2/15/17   Louise Schneider MD   simethicone 125 mg chewable tablet Take 125 mg by mouth every six (6) hours as needed for Flatulence. 1/30/17   Miko Aguirre MD   brimonidine (ALPHAGAN P) 0.1 % ophthalmic solution Administer 1 Drop to right eye every eight (8) hours. Provider, Historical   esomeprazole (NEXIUM) 40 mg capsule Take  by mouth daily. Provider, Historical   brimonidine (ALPHAGAN) 0.15 % ophthalmic solution Administer 1 Drop to right eye three (3) times daily. Provider, Historical   VIT C/E/ZN/COPPR/LUTEIN/ZEAXAN (OCUVITE LUTEIN & ZEAXANTHIN PO) Take 1 Tab by mouth daily. Provider, Historical   acetaminophen (TYLENOL) 500 mg tablet Take 650 mg by mouth every six (6) hours as needed for Pain. Provider, Historical   clobetasol (TEMOVATE) 0.05 % external solution Apply 1 Drop to affected area two (2) times a day.  apply to left upper arm twice a day 4/1/14   Provider, Historical   dorzolamide-timolol (COSOPT) 2-0.5 % ophthalmic solution Administer 1 Drop to both eyes two (2) times a day. 2/2/14   Provider, Historical   ketoconazole (NIZORAL) 2 % shampoo Apply 1 mL to affected area daily. apply to hair and let sit for 3 min and rinse out 4/1/14   Provider, Historical   TRAVATAN Z 0.004 % ophthalmic solution Administer 1 Drop to right eye nightly. 2/2/14   Provider, Historical   calcium-cholecalciferol, d3, (CALCIUM 600 + D) 600-125 mg-unit Tab Take 1 Tab by mouth two (2) times a day. Provider, Historical   polyethylene glycol (MIRALAX) 17 gram packet Take 17 g by mouth daily. mixed with water or juice    Provider, Historical   Cholecalciferol, Vitamin D3, (VITAMIN D3) 1,000 unit cap Take 1 Tab by mouth daily. Provider, Historical     No Known Allergies     Review of Systems:  A comprehensive review of systems was negative except for that written in the History of Present Illness. No results found for: HBA1C, JGD1QHDU, HGBE8, WSQ7UAWD, QXP7URUU     Immunization History   Administered Date(s) Administered    Influenza High Dose Vaccine PF 10/12/2016, 11/29/2017, 12/05/2018    Influenza Vaccine 10/17/2013, 11/12/2014    Influenza Vaccine PF 09/10/2015    Influenza Vaccine Whole 09/06/2012    Pneumococcal Conjugate (PCV-13) 02/11/2015    Pneumococcal Polysaccharide (PPSV-23) 01/01/1993    TDAP Vaccine 05/31/2012    Tdap 05/31/2012    Zoster Vaccine, Live 11/13/2014       Body mass index is 19.08 kg/m². Counseling regarding nutrition done: No     Current medications:  Current Outpatient Medications   Medication Sig Dispense Refill    simvastatin (ZOCOR) 40 mg tablet TAKE <1> TABLET BY MOUTH NIGHTLY 90 Tab 2    vits A,C,E/lutein/minerals (VISION FORMULA, WITH LUTEIN, PO) Take 1 Tab by mouth daily.  aspirin 81 mg chewable tablet Take 81 mg by mouth daily.  lubiPROStone (AMITIZA) 8 mcg capsule Take 1 Cap by mouth daily (with breakfast).  90 Cap 3    levothyroxine (SYNTHROID) 50 mcg tablet TAKE ONE (1) TABLET BY MOUTH DAILY. 90 Tab 2    traMADol (ULTRAM) 50 mg tablet Take 1 Tab by mouth every eight (8) hours as needed for Pain. Max Daily Amount: 150 mg. 30 Tab 0    fluticasone (FLONASE) 50 mcg/actuation nasal spray USE 2 SPRAYS IN EACH NOSTRIL ONCE DAILY 3 Bottle 3    enalapril (VASOTEC) 10 mg tablet Take 1 Tab by mouth two (2) times a day. 180 Tab 3    lidocaine (LIDODERM) 5 % 1 Patch by TransDERmal route every twenty-four (24) hours. Apply patch to the affected area for 12 hours a day and remove for 12 hours a day. 1 Each 5    simethicone 125 mg chewable tablet Take 125 mg by mouth every six (6) hours as needed for Flatulence. 60 Tab 0    brimonidine (ALPHAGAN P) 0.1 % ophthalmic solution Administer 1 Drop to right eye every eight (8) hours.  esomeprazole (NEXIUM) 40 mg capsule Take  by mouth daily.  brimonidine (ALPHAGAN) 0.15 % ophthalmic solution Administer 1 Drop to right eye three (3) times daily.  VIT C/E/ZN/COPPR/LUTEIN/ZEAXAN (OCUVITE LUTEIN & ZEAXANTHIN PO) Take 1 Tab by mouth daily.  acetaminophen (TYLENOL) 500 mg tablet Take 650 mg by mouth every six (6) hours as needed for Pain.  clobetasol (TEMOVATE) 0.05 % external solution Apply 1 Drop to affected area two (2) times a day. apply to left upper arm twice a day      dorzolamide-timolol (COSOPT) 2-0.5 % ophthalmic solution Administer 1 Drop to both eyes two (2) times a day.  ketoconazole (NIZORAL) 2 % shampoo Apply 1 mL to affected area daily. apply to hair and let sit for 3 min and rinse out      TRAVATAN Z 0.004 % ophthalmic solution Administer 1 Drop to right eye nightly.  calcium-cholecalciferol, d3, (CALCIUM 600 + D) 600-125 mg-unit Tab Take 1 Tab by mouth two (2) times a day.  polyethylene glycol (MIRALAX) 17 gram packet Take 17 g by mouth daily. mixed with water or juice      Cholecalciferol, Vitamin D3, (VITAMIN D3) 1,000 unit cap Take 1 Tab by mouth daily.            Objective: Physical Exam:     Visit Vitals  /79 (BP 1 Location: Left arm)   Pulse 62   Temp 98.2 °F (36.8 °C)   Resp 18   Ht 5' 7\" (1.702 m)   Wt 55.2 kg (121 lb 12.8 oz)   SpO2 96%   BMI 19.08 kg/m²       General: well developed, well nourished, pleasant , NAD. Hygiene good  Psych: cooperative. Pleasant. No anxiety or depression. Normal mood and affect. Neuro: alert and oriented to person/place/situation. Otherwise nonfocal.  Derm: Normal turgor for age, dry skin  HEENT: Normocephalic, atraumatic. EOMI. Conjunctiva clear. No scleral icterus. Neck: Normal range of motion. No masses. Chest: Good air entry bilaterally. Respirations nonlabored  Cardio: Normal heart sounds,no rubs, murmurs or gallops  Abdomen: Soft, nontender, nondistended, normoactive bowel sounds  Lower extremities: color normal; temperature normal. Hair growth is not present. Calves are supple, nontender, approximately equally sized in comparison.  Capillary refill <3 sec      Diabetic Foot Ulcer/Neuropathic   Is Wound Greater than 1.0 sq cm ? : Yes  Re-Current Wound with Skin Substitue within Last Year : No  X-Ray in Last 3 Months: No  GUSTAVO In Last 6 Months : No  Smoking Status: Non- Smoker  Wound Free from Infection : No Culture Done  Is Wound Free of Eschar, [place] , and / or Bio-[place]: Yes  Malignant Process in Wound : No Biopsy Done  Hemoglobin A1Cin Last 3 Months: No  Compression Therapy of 20mm or greater ?: No     Ulcer Description:   Wound Ankle Right;Lateral (Active)   Dressing Status  Clean, dry, and intact 9/13/2019  1:18 PM   Dressing Type  Open to air 2/1/2019  2:51 PM   Non-Pressure Injury Full thickness (subcut/muscle) 6/28/2019  1:16 PM   Wound Length (cm) 0.8 cm 9/13/2019  1:18 PM   Wound Width (cm) 1.1 cm 9/13/2019  1:18 PM   Wound Depth (cm) 0.1 9/13/2019  1:18 PM   Wound Surface area (cm^2) 0.88 cm^2 9/13/2019  1:18 PM   Change in Wound Size % 32.31 9/13/2019  1:18 PM   Condition of Base Granulation 9/13/2019  1:18 PM Condition of Edges Open 2/22/2019  1:27 PM   Tissue Type Red 6/28/2019  1:16 PM   Tissue Type Percent Pink 100 7/26/2019  1:30 PM   Tissue Type Percent Red 100 9/13/2019  1:18 PM   Tissue Type Percent Yellow 5 2/1/2019  2:51 PM   Tissue Type Percent Other (comment) 10 2/22/2019  1:27 PM   Drainage Amount  Small  9/13/2019  1:18 PM   Drainage Color Serous 9/13/2019  1:18 PM   Wound Odor None 9/13/2019  1:18 PM   Periwound Skin Condition Intact 9/13/2019  1:18 PM   Cleansing and Cleansing Agents  Normal saline 9/13/2019  1:18 PM   Procedure Tolerated Well 8/23/2019  1:23 PM   Number of days: 224       [REMOVED] Wound Ankle Lateral;Right (Removed)   Number of days: 167       [REMOVED] Wound Knee Anterior; Left (Removed)   Number of days: 52       [REMOVED] Wound Face (Removed)   Number of days: 17       [REMOVED] Wound Knee Right (Removed)   Number of days: 33       [REMOVED] Wound Knee Right (Removed)   Number of days: 0       [REMOVED] Wound Head Right (Removed)   Number of days: 1       [REMOVED] Wound Calf Right (Removed)   Number of days: 4       [REMOVED] Wound Leg Upper Right (Removed)   Number of days: 20       [REMOVED] Wound Ankle Right (Removed)   Number of days: 84       [REMOVED] Wound Leg Lower Anterior; Left (Removed)   Dressing Status  Clean, dry, and intact 4/26/2019  1:32 PM   Dressing Type  Gauze 3/29/2019  2:51 PM   Wound Length (cm) 0 cm 4/26/2019  1:32 PM   Wound Width (cm) 0 cm 4/26/2019  1:32 PM   Wound Depth (cm) 0 4/26/2019  1:32 PM   Wound Surface area (cm^2) 0 cm^2 4/26/2019  1:32 PM   Change in Wound Size % 100 4/26/2019  1:32 PM   Condition of Base Other (comment) 3/29/2019  2:51 PM   Epithelialization (%) 100 4/26/2019  1:32 PM   Tissue Type Percent Pink 100 4/26/2019  1:32 PM   Tissue Type Percent Red 100 4/12/2019  1:20 PM   Drainage Amount  None 4/26/2019  1:32 PM   Drainage Color Serosanguinous 4/12/2019  1:20 PM   Wound Odor None 4/26/2019  1:32 PM   Periwound Skin Condition Ecchymosis;Edematous 4/12/2019  1:20 PM   Cleansing and Cleansing Agents  Soap and water 4/26/2019  1:32 PM   Procedure Tolerated Well 4/12/2019  1:20 PM   Number of days: 28       [REMOVED] Wound Leg lower Left;Posterior (Removed)   Dressing Status Clean, dry, and intact 6/14/2019  1:22 PM   Wound Length (cm) 0 cm 6/14/2019  1:22 PM   Wound Width (cm) 0 cm 6/14/2019  1:22 PM   Wound Depth (cm) 0 cm 6/14/2019  1:22 PM   Wound Volume (cm^3) 0 cm^3 6/14/2019  1:22 PM   Condition of Base Epithelializing 6/14/2019  1:22 PM   Epithelialization (%) 100 6/14/2019  1:22 PM   Tissue Type Percent Maroon/Purple 100 % 5/10/2019  1:35 PM   Tissue Type Percent Pink 100 6/14/2019  1:22 PM   Tissue Type Percent Red 20 6/7/2019  1:23 PM   Tissue Type Percent Yellow 20 5/24/2019  1:13 PM   Drainage Amount None 6/14/2019  1:22 PM   Drainage Color Serous 6/7/2019  1:23 PM   Wound Odor None 6/14/2019  1:22 PM   Zeny-wound Assessment Intact 6/14/2019  1:22 PM   Cleansing and Cleansing Agents  Normal saline 6/14/2019  1:22 PM   Dressing Changed Changed/New 6/14/2019  1:22 PM   Procedure Tolerated Well 6/7/2019  1:23 PM   Number of days: 35       [REMOVED] Wound Elbow Left; Lower unapproximated skin tear 07/17/19 (Removed)   Number of days: 21       [REMOVED] Wound Elbow Left (Removed)   Dressing Status Old drainage 7/26/2019  1:30 PM   Wound Length (cm) 4.5 cm 7/26/2019  1:30 PM   Wound Width (cm) 2.5 cm 7/26/2019  1:30 PM   Wound Depth (cm) 0.1 cm 7/26/2019  1:30 PM   Wound Volume (cm^3) 1.12 cm^3 7/26/2019  1:30 PM   Condition of Base Eschar;[place] 7/26/2019  1:30 PM   Tissue Type Percent Black 60 % 7/26/2019  1:30 PM   Tissue Type Percent Pink 40 7/26/2019  1:30 PM   Drainage Amount Small 7/26/2019  1:30 PM   Drainage Color Serosanguinous 7/26/2019  1:30 PM   Wound Odor None 7/26/2019  1:30 PM   Zeny-wound Assessment Intact 7/26/2019  1:30 PM   Cleansing and Cleansing Agents  Normal saline 7/26/2019  1:30 PM   Number of days: 12 [REMOVED] Wound Wrist Left 07/29/19 (Removed)   Number of days: 7         Data Review:   No results found for this or any previous visit (from the past 24 hour(s)). Assessment:     80 y.o. female with R lower leg medial combined ulcer.     Problem List  Date Reviewed: 3/13/2019          Codes Class Noted    Wound of right leg ICD-10-CM: Q10.834Q  ICD-9-CM: 891.0  1/22/2019        Osteoarthrosis, unspecified whether generalized or localized, lower leg ICD-10-CM: M17.10  ICD-9-CM: 715.96  3/13/2013        TIA (transient ischemic attack) ICD-10-CM: G45.9  ICD-9-CM: 435.9  1/3/2013    Overview Signed 1/3/2013  9:26 AM by Fernando Silva MD     2001             CN VI palsy (Chronic) ICD-10-CM: H49.20  ICD-9-CM: 378.54  1/3/2013    Overview Signed 1/3/2013  9:03 AM by Fernando Silva MD     Spring 2012- Dr. Yumiko Peoples degeneration (Chronic) ICD-10-CM: H35.30  ICD-9-CM: 362.50  1/3/2013    Overview Signed 1/3/2013  9:05 AM by MD Dr. César Macedo             Glaucoma (Chronic) ICD-10-CM: H40.9  ICD-9-CM: 365.9  1/3/2013    Overview Signed 1/3/2013  9:05 AM by MD Dr. César Macedo             Hearing loss, sensorineural (Chronic) ICD-10-CM: H90.5  ICD-9-CM: 389.10  1/3/2013        Senile osteoporosis (Chronic) ICD-10-CM: M81.0  ICD-9-CM: 733.01  10/31/2012    Overview Signed 1/3/2013  9:04 AM by MD Dr. Caleb Macedo             Hyperlipidemia (Chronic) ICD-10-CM: E78.5  ICD-9-CM: 272.4  10/31/2012        Hypertension (Chronic) ICD-10-CM: I10  ICD-9-CM: 401.9  10/31/2012        Fatigue ICD-10-CM: R53.83  ICD-9-CM: 780.79  10/31/2012        Pain in joint, multiple sites ICD-10-CM: M25.50  ICD-9-CM: 719.49  10/31/2012        Other malaise and fatigue ICD-10-CM: R53.81, R53.83  ICD-9-CM: 780.79  10/31/2012        Hypothyroidism (Chronic) ICD-10-CM: E03.9  ICD-9-CM: 244.9  10/31/2012        Mitral regurgitation (Chronic) ICD-10-CM: I34.0  ICD-9-CM: 424.0 10/31/2012    Overview Signed 1/3/2013  9:08 AM by MD Dr. Symone Godinez                    Needs:  Good local wound care  Edema management  Nutrition optimization  Good Diabetic control    Plan:     Orders Placed This Encounter    WOUND CARE, DRESSING CHANGE     Right lateral ankle/lower leg:  Cleanse wound and periwound with wound cleanser or normal saline. Endoform-Cut to wound size and apply to wound bed. Cover with [place] to change dressing 3x weekly. M, W, F     Standing Status:   Standing     Number of Occurrences:   1        Patient understood and agrees with plan. Questions answered. Follow-up Information     Follow up With Specialties Details Why Contact Info    [address] In 1 month  [facility] [address] Marcelina Kimball [address] [address]  [phone]             Any procedures done today in the [address] [facility],[address] are documented in a separate note in [facility] and made part of this record by reference.      Dictated using voice recognition software; proofread, but unrecognized errors may exist.    Signed By: Robynn Koyanagi., MD     September 13, 2019

## 2019-09-13 NOTE — WOUND CARE
[address]  [address], [address]  Phone: [phone]  Fax: [phone]    Patient: Ama Carreon MRN: [id number]  SSN: [id number]    YOB: 1923  Age: 80 y.o. Sex: female       Return Appointment: 1 month with Antoni Funez MD    Instructions:  Right lateral ankle/lower leg:  Cleanse wound and periwound with wound cleanser or normal saline. Endoform-Cut to wound size and apply to wound bed. Cover with [facility] to change dressing 3x weekly. M, W, F    Should you experience increased redness, swelling, pain, foul odor, size of wound(s), or have a temperature over 101 degrees please contact the [address] [place] [address] Road at [phone] or if after hours contact your primary care physician or go to the hospital emergency department.     Signed By: Mariel Valentine RN     September 13, 2019

## 2019-09-13 NOTE — WOUND CARE
09/13/19 1318   Wound Ankle Right;Lateral   Date First Assessed/Time First Assessed: 02/01/19 1450   Wound Type: Venous  Location: Ankle  Orientation: Right;Lateral   Dressing Status  Clean, dry, and intact   Dressing Type    (Endoform, Bordered Foam)   Wound Length (cm) 0.8 cm   Wound Width (cm) 1.1 cm   Wound Depth (cm) 0.1   Wound Surface area (cm^2) 0.88 cm^2   Change in Wound Size % 32.31   Condition of Base Granulation   Tissue Type Percent Red 100   Drainage Amount  Small    Drainage Color Serous   Wound Odor None   Periwound Skin Condition Intact   Cleansing and Cleansing Agents  Normal saline       Patient takes ASA 81mg Daily

## 2019-09-13 NOTE — WOUND CARE
Clinic Level of Care Assessment    NAME:  Roxana Dean OF BIRTH:  5/29/1923 GENDER: female  MEDICAL RECORD NUMBER:  [id number]   DATE:  9/13/2019      Wound Count Document in [address] [place] [address]  Number of Wounds Assessed Points   No Wounds/Ulcers []   0   Less than Three Wounds/Ulcers [x]   1   3-6 Wounds/Ulcers []   2   Greater than 6 Wounds/Ulcers []   3     Ambulation Status Document in Coord/KRAIG/Mobility tab  Status Definition Points   Independent Independently able to ambulate. Fully able (without any assistance) to get on/off exam table/chair. []   0   Minimal Physical Assistance Requires physical assistance of one person to ambulate and/or position patient to be examined. Includes necessary physical assistance to position lower extremities on/off stool. [x]   1   Moderate Physical Assistance Requires at least one staff member to physically assist patient in ambulating into treatment room, and on/off exam table. []   2   Full Assistance Requires assistance of at least two staff members to transfer patient into treatment room and/or on/off exam table/chair. \"Total Transfer\". []   3     Dressing Complexity Document in LDA and Write Appropriate Order  Complexity Definition Points   No Dressing  []   0   Simple Minimal, simple dressing. i.e. Band-aid, gauze, simple wrap. [x]   1   Intermediate Moderately complicated requiring licensed personnel to apply i.e. collagen matrix, ointments, gels, alginates. []   2   Complex Complicated requiring licensed personnel to apply dressings 6 or more wounds. []   3     Teaching Effort Document in Education Tab   Effort Definition Points   No Teaching  []   0   Simple Reinforce two or less topics. Document in Education navigator. [x]   1   Intermediate Reinforce three to five topics and/or one additional   new topic. Document in Education navigator. []   2   Complex Teach more than one new topic.  New patient information   packet reviewed and/or reinforce more than three topics. Document in Education navigator. HBO initial instruction. []   3       Patient Assessment and Planning  Planning Definition Points   Simple Multiple System Simple: Simple follow-up with routine assessment and planning. If Discharged, instructions and long term/follow-up care given to patient/caregiver. Discharged, instructions and/or After Visit Summary given to patient/caregiver and instructions completed. []   1   Intermediate Multiple System Intermediate: Contact with outside resources; i.e. Telephone calls to home health, [facility]. May include filling out forms and writing letters, arranging transportation, communication with insurance , vendors, etc.  Discharged, instructions and/or After Visit Summary given to patient/caregiver and instructions completed. [x]   2   Complex Multiple System Complex: Full, comprehensive assessment and planning. Follow the entire navigator under Wound Visit charting filling out each tab which includes OP Adm Database Screening, Education and CarePlan  HBO risk assessment completed. Discharged, instructions and/or After Visit Summary given to patient/caregiver and instructions completed.    []   3           Is this the Patient's First Visit to the [address] [place] [address] Road  No      Is this Patient Established @ [facility]  Yes             Clinical Level of Care      Points  0-2  Level 1 []     Points  3-5  Level 2 []     Points  6-9  Level 3 [x]     Points  10-12  Level 4 []     Points  13-15  Level 5 []       Electronically signed by Flip Bourne RN on 9/13/2019 at 1:46 PM

## 2019-10-11 NOTE — WOUND CARE
Clinic Level of Care Assessment NAME:  Elias Anderson YOB: 1923 GENDER: female MEDICAL RECORD NUMBER:  [id number] DATE:  10/11/2019 Wound Count Document in [facility] Number of Wounds Assessed Points No Wounds/Ulcers []   0 Less than Three Wounds/Ulcers [x]   1  
3-6 Wounds/Ulcers []   2 Greater than 6 Wounds/Ulcers []   3 Ambulation Status Document in Coord/KRAIG/Mobility tab Status Definition Points Independent Independently able to ambulate. Fully able (without any assistance) to get on/off exam table/chair. []   0 Minimal Physical Assistance Requires physical assistance of one person to ambulate and/or position patient to be examined. Includes necessary physical assistance to position lower extremities on/off stool. [x]   1 Moderate Physical Assistance Requires at least one staff member to physically assist patient in ambulating into treatment room, and on/off exam table. []   2 Full Assistance Requires assistance of at least two staff members to transfer patient into treatment room and/or on/off exam table/chair. \"Total Transfer\". []   3 Dressing Complexity Document in [facility] and Write Appropriate Order Complexity Definition Points No Dressing  []   0 Simple Minimal, simple dressing. i.e. Band-aid, gauze, simple wrap. [x]   1 Intermediate Moderately complicated requiring licensed personnel to apply i.e. collagen matrix, ointments, gels, alginates. []   2 Complex Complicated requiring licensed personnel to apply dressings 6 or more wounds. []   3 Teaching Effort Document in Education Tab Effort Definition Points No Teaching  []   0 Simple Reinforce two or less topics. Document in Education navigator.  []   1 Intermediate Reinforce three to five topics and/or one additional  
new topic. Document in Education navigator. [x]   2 Complex Teach more than one new topic. New patient information  
packet reviewed and/or reinforce more than three topics. Document in Education navigator. HBO initial instruction. []   3 Patient Assessment and Planning Planning Definition Points Simple Multiple System Simple: Simple follow-up with routine assessment and planning. If Discharged, instructions and long term/follow-up care given to patient/caregiver. Discharged, instructions and/or After Visit Summary given to patient/caregiver and instructions completed. []   1 Intermediate Multiple System Intermediate: Contact with outside resources; i.e. Telephone calls to home health, DME. May include filling out forms and writing letters, arranging transportation, communication with insurance , vendors, etc.  Discharged, instructions and/or After Visit Summary given to patient/caregiver and instructions completed. [x]   2 Complex Multiple System Complex: Full, comprehensive assessment and planning. Follow the entire navigator under Wound Visit charting filling out each tab which includes OP Adm Database Screening, Education and CarePlan  HBO risk assessment completed. Discharged, instructions and/or After Visit Summary given to patient/caregiver and instructions completed. []   3 Is this the Patient's First Visit to the [address] [place] [address] Road No 
 
 
Is this Patient Established @ [facility] 
Yes Clinical Level of Care Points  0-2  Level 1 [] Points  3-5  Level 2 [] Points  6-9  Level 3 [x] Points  10-12  Level 4 [] Points  13-15  Level 5 [] Electronically signed by Wendy Lara PT, [facility] on 10/11/2019 at 1:24 PM

## 2019-10-11 NOTE — WOUND CARE
[address] [place] [address] [place], [address] Phone: [phone] Fax: [phone] Patient: Ismael Calderon MRN: [id number]  SSN: [id number] YOB: 1923  Age: 80 y.o. Sex: female Return Appointment: 1 month with Julio Peña MD 
 
Instructions: Right lateral ankle/lower leg: 
Cleanse wound and periwound with wound cleanser or normal saline. Endoform-Cut to wound size and apply to wound bed. Cover with ABD and wrap with rolled gauze. Patient appears to be irritated by adhesive of bordered foam. 
[facility] dressing [facility]. M, W, F Should you experience increased redness, swelling, pain, foul odor, size of wound(s), or have a temperature over 101 degrees please contact the [address] [place] [address] Road at [phone] or if after hours contact your primary care physician or go to the hospital emergency department. Signed By: Mary Domínguez, PT, [address] [facility] October 11, 2019

## 2019-10-11 NOTE — PROGRESS NOTES
10/11/19 1319 Wound Ankle Right;Lateral  
Date First Assessed/Time First Assessed: 02/01/19 1450   Wound Type: Venous  Location: Ankle  Orientation: Right;Lateral  
Dressing Status  Clean, dry, and intact Dressing Type   
(Endoform, bordered foam) Wound Length (cm) 0.7 cm Wound Width (cm) 0.8 cm Wound Depth (cm) 0.1 Wound Surface area (cm^2) 0.56 cm^2 Change in Wound Size % 56.92 Condition of Base Granulation Tissue Type Percent Red 100 Drainage Amount  Small Drainage Color Serous Wound Odor None Cleansing and Cleansing Agents  Soap and water Patient is currently taking Aspirin 81 mg

## 2019-10-16 NOTE — PROGRESS NOTES
Wound Center Progress Note Patient: Romero Espino MRN: [id number]  SSN: [id number] YOB: 1923  Age: 80 y.o. Sex: female Subjective: Chief Complaint: 
R ankle wound History of Present Illness:    
 
Wound Caused By: Gisel Masoud Associated Signs and Symptoms: drainage, infection Timing: constant Quality: wound Severity: full thickness Modifying Factors: age, overall infirmity Has been on Bax. Has HH assisting. Family very attentive but live in [place]. 2/22/2019 Has been performing dressings herself. Discharged from [facility]. Lives alone. Unable to drive. Extremely poor eyesight. Cannot see wound to place dressings. 3/29/2019 Continue to due well with wound on the right, but has new wound on the left. Cannot recall any trauma. Not on any blood thinners. 4/12/2019 Did well with endoform. Denies any new wounds. Minimal range. 4/26/2019 Feel right side may be healed. No more falls or other trauma. 5/10/2017 Does not recall any trauma, but has developed new wounds on the left side. No changes in medications. No recalled falls. 6/14/2019 No new issues. More recent wound is healing. Initial remains closed. No further trauma. 6/28/2019 Had some pain with the wraps since last visit. Had to cut off. 
 
7/12/2019 Wraps tolerated since last. Not too tight. Feels have improved significantly. 8/9/2019 Patient and caregiver think much improved since last. No new issues. Tolerating wraps. No new issues. Left arm doing well. No falls. 10/11/2019 No new issues. Tolerating dressings. No pain. Some adherence with border foam and skin irritation . Past Medical History:  
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  
 Skin cancer of scalp 2012  TIA (transient ischemic attack)  Unspecified hypothyroidism 10/31/2012 Past Surgical History:  
Procedure Laterality Date  HX APPENDECTOMY  HX COLONOSCOPY    
 HX MALIGNANT SKIN LESION EXCISION    
 scalp  RECTAL SENSATION TEST, BALLOON Family History Problem Relation Age of Onset  Hypertension Mother  Heart Disease Father  Breast Cancer Neg Hx Social History Tobacco Use  Smoking status: Never Smoker  Smokeless tobacco: Never Used Substance Use Topics  Alcohol use: No  
   
Prior to Admission medications Medication Sig Start Date End Date Taking? Authorizing Provider  
fluticasone propionate (FLONASE) 50 mcg/actuation nasal spray USE 2 SPRAYS IN EACH NOSTRIL ONCE DAILY 9/23/19   Horton, Pleas Aschoff, MD  
simvastatin (ZOCOR) 40 mg tablet TAKE <1> TABLET BY MOUTH NIGHTLY Patient taking differently: TAKE 1 TABLET BY MOUTH NIGHTLY 8/27/19   Louise Lara MD  
vits A,C,E/lutein/minerals (VISION FORMULA, WITH LUTEIN, PO) Take 1 Tab by mouth daily. Provider, Historical  
aspirin 81 mg chewable tablet Take 81 mg by mouth daily. Provider, Historical  
lubiPROStone (AMITIZA) 8 mcg capsule Take 1 Cap by mouth daily (with breakfast). 5/31/19   Louise Lara MD  
levothyroxine (SYNTHROID) 50 mcg tablet TAKE ONE (1) TABLET BY MOUTH DAILY. 3/15/19   Louise Lara MD  
traMADol (ULTRAM) 50 mg tablet Take 1 Tab by mouth every eight (8) hours as needed for Pain.  Max Daily Amount: 150 mg. 12/27/18   Vivian Joaquin MD  
 enalapril (VASOTEC) 10 mg tablet Take 1 Tab by mouth two (2) times a day. 6/7/18   Louise Bennett MD  
lidocaine (LIDODERM) 5 % 1 Patch by TransDERmal route every twenty-four (24) hours. Apply patch to the affected area for 12 hours a day and remove for 12 hours a day. 2/15/17   Louise Bennett MD  
simethicone 125 mg chewable tablet Take 125 mg by mouth every six (6) hours as needed for Flatulence. 1/30/17   Ronal Fuller MD  
brimonidine (ALPHAGAN P) 0.1 % ophthalmic solution Administer 1 Drop to right eye every eight (8) hours. Provider, Historical  
esomeprazole (NEXIUM) 40 mg capsule Take 40 mg by mouth daily. Provider, Historical  
brimonidine (ALPHAGAN) 0.15 % ophthalmic solution Administer 1 Drop to right eye three (3) times daily. Provider, Historical  
VIT C/E/ZN/COPPR/LUTEIN/ZEAXAN (OCUVITE LUTEIN & ZEAXANTHIN PO) Take 1 Tab by mouth daily. Provider, Historical  
acetaminophen (TYLENOL) 500 mg tablet Take 650 mg by mouth every six (6) hours as needed for Pain. Provider, Historical  
clobetasol (TEMOVATE) 0.05 % external solution Apply 1 Drop to affected area two (2) times a day. apply to left upper arm twice a day 4/1/14   Provider, Historical  
dorzolamide-timolol (COSOPT) 2-0.5 % ophthalmic solution Administer 1 Drop to both eyes two (2) times a day. 2/2/14   Provider, Historical  
ketoconazole (NIZORAL) 2 % shampoo Apply 1 mL to affected area daily. apply to hair and let sit for 3 min and rinse out 4/1/14   Provider, Historical  
TRAVATAN Z 0.004 % ophthalmic solution Administer 1 Drop to right eye nightly. 2/2/14   Provider, Historical  
calcium-cholecalciferol, d3, (CALCIUM 600 + D) 600-125 mg-unit Tab Take 1 Tab by mouth two (2) times a day. Provider, Historical  
polyethylene glycol (MIRALAX) 17 gram packet Take 17 g by mouth daily.   mixed with water or juice    Provider, Historical  
Cholecalciferol, Vitamin D3, (VITAMIN D3) 1,000 unit cap Take 1 Tab by mouth daily. Provider, Historical  
 
No Known Allergies Review of Systems: 
CONSTITUTIONAL: No fever, chills HEAD: No headache EYES: No visual loss ENT: No hearing loss SKIN: No rash CARDIOVASCULAR: No chest pain RESPIRATORY: No shortness of breath GASTROINTESTINAL: No nausea, vomiting GENITOURINARY: No excessive urination NEUROLOGICAL: + mild le weakness MUSCULOSKELETAL: No muscle pain. No neck pain HEMATOLOGIC: No easy bleeding LYMPHATICS: No lymphedema. PSYCHIATRIC: No current depression ENDOCRINOLOGIC: No high sugars ALLERGIES: No history of asthma, hives, eczema or rhinitis. No results found for: HBA1C, ASI7XJNJ, HGBE8, NLI5UUAX, HIP5FSZA, TVA4HQIJ Immunization History Administered Date(s) Administered  Influenza High Dose Vaccine PF 10/12/2016, 11/29/2017, 12/05/2018  Influenza Vaccine 10/17/2013, 11/12/2014  Influenza Vaccine PF 09/10/2015  Influenza Vaccine Whole 09/06/2012  Pneumococcal Conjugate (PCV-13) 02/11/2015  Pneumococcal Polysaccharide (PPSV-23) 01/01/1993  TDAP Vaccine 05/31/2012  Tdap 05/31/2012  Zoster Vaccine, Live 11/13/2014 Body mass index is 18.83 kg/m². Current medications: 
Current Outpatient Medications Medication Sig Dispense Refill  fluticasone propionate (FLONASE) 50 mcg/actuation nasal spray USE 2 SPRAYS IN EACH NOSTRIL ONCE DAILY 3 Bottle 3  
 simvastatin (ZOCOR) 40 mg tablet TAKE <1> TABLET BY MOUTH NIGHTLY (Patient taking differently: TAKE 1 TABLET BY MOUTH NIGHTLY) 90 Tab 2  vits A,C,E/lutein/minerals (VISION FORMULA, WITH LUTEIN, PO) Take 1 Tab by mouth daily.  aspirin 81 mg chewable tablet Take 81 mg by mouth daily.  lubiPROStone (AMITIZA) 8 mcg capsule Take 1 Cap by mouth daily (with breakfast). 90 Cap 3  
 levothyroxine (SYNTHROID) 50 mcg tablet TAKE ONE (1) TABLET BY MOUTH DAILY.  90 Tab 2  
 traMADol (ULTRAM) 50 mg tablet Take 1 Tab by mouth every eight (8) hours as needed for Pain. Max Daily Amount: 150 mg. 30 Tab 0  
 enalapril (VASOTEC) 10 mg tablet Take 1 Tab by mouth two (2) times a day. 180 Tab 3  
 lidocaine (LIDODERM) 5 % 1 Patch by TransDERmal route every twenty-four (24) hours. Apply patch to the affected area for 12 hours a day and remove for 12 hours a day. 1 Each 5  
 simethicone 125 mg chewable tablet Take 125 mg by mouth every six (6) hours as needed for Flatulence. 60 Tab 0  
 brimonidine (ALPHAGAN P) 0.1 % ophthalmic solution Administer 1 Drop to right eye every eight (8) hours.  esomeprazole (NEXIUM) 40 mg capsule Take 40 mg by mouth daily.  brimonidine (ALPHAGAN) 0.15 % ophthalmic solution Administer 1 Drop to right eye three (3) times daily.  VIT C/E/ZN/COPPR/LUTEIN/ZEAXAN (OCUVITE LUTEIN & ZEAXANTHIN PO) Take 1 Tab by mouth daily.  acetaminophen (TYLENOL) 500 mg tablet Take 650 mg by mouth every six (6) hours as needed for Pain.  clobetasol (TEMOVATE) 0.05 % external solution Apply 1 Drop to affected area two (2) times a day. apply to left upper arm twice a day  dorzolamide-timolol (COSOPT) 2-0.5 % ophthalmic solution Administer 1 Drop to both eyes two (2) times a day.  ketoconazole (NIZORAL) 2 % shampoo Apply 1 mL to affected area daily. apply to hair and let sit for 3 min and rinse out  TRAVATAN Z 0.004 % ophthalmic solution Administer 1 Drop to right eye nightly.  calcium-cholecalciferol, d3, (CALCIUM 600 + D) 600-125 mg-unit Tab Take 1 Tab by mouth two (2) times a day.  polyethylene glycol (MIRALAX) 17 gram packet Take 17 g by mouth daily. mixed with water or juice  Cholecalciferol, Vitamin D3, (VITAMIN D3) 1,000 unit cap Take 1 Tab by mouth daily. Objective:  
 
Physical Exam:  
 
Visit Vitals /76 (BP 1 Location: Left arm) Pulse 63 Temp 98.3 °F (36.8 °C) Resp 18 Ht 5' 7\" (1.702 m) Wt 54.5 kg (120 lb 3.2 oz) SpO2 94% BMI 18.83 kg/m² General: well developed thin appearing Psych: cooperative. Pleasant Neuro: alert and oriented to person/place/situation. Otherwise nonfocal. 
Derm: Normal turgor for age, dry skin HEENT: Normocephalic, atraumatic. EOMI. Neck: Normal range of motion. Chest: Respirations nonlabored Cardio: RRR Abdomen: Soft, nondistended Lower extremities: No hemosiderrosis + significant varicosities Capillary refill <3 sec Right 1+ DP/PT Left 1+ DP/PT Thin friable skin. Left wound ecchymoses, fully granulated. Left arm wound remains closed. Right side wound remains. Assessment/Plan:  
 
Recovered from previous deterioration. No new issues. Much improved. Continue RLE dressing and change to non stick dressing right side.

## 2019-11-08 NOTE — WOUND CARE
Clinic Level of Care Assessment NAME:  Ab Naranjo YOB: 1923 GENDER: female MEDICAL RECORD NUMBER:  [id number] DATE:  11/8/2019 Wound Count Document in [facility] Number of Wounds Assessed Points No Wounds/Ulcers []   0 Less than Three Wounds/Ulcers [x]   1  
3-6 Wounds/Ulcers []   2 Greater than 6 Wounds/Ulcers []   3 Ambulation Status Document in Coord/KRAIG/Mobility tab Status Definition Points Independent Independently able to ambulate. Fully able (without any assistance) to get on/off exam table/chair. [x]   0 Minimal Physical Assistance Requires physical assistance of one person to ambulate and/or position patient to be examined. Includes necessary physical assistance to position lower extremities on/off stool. []   1 Moderate Physical Assistance Requires at least one staff member to physically assist patient in ambulating into treatment room, and on/off exam table. []   2 Full Assistance Requires assistance of at least two staff members to transfer patient into treatment room and/or on/off exam table/chair. \"Total Transfer\". []   3 Dressing Complexity Document in [facility] and Write Appropriate Order Complexity Definition Points No Dressing  [x]   0 Simple Minimal, simple dressing. i.e. Band-aid, gauze, simple wrap. []   1 Intermediate Moderately complicated requiring licensed personnel to apply i.e. collagen matrix, ointments, gels, alginates. []   2 Complex Complicated requiring licensed personnel to apply dressings 6 or more wounds. []   3 Teaching Effort Document in Education Tab Effort Definition Points No Teaching  []   0 Simple Reinforce two or less topics. Document in Education navigator.  []   1 Intermediate Reinforce three to five topics and/or one additional  
new topic. Document in Education navigator. [x]   2 Complex Teach more than one new topic. New patient information  
packet reviewed and/or reinforce more than three topics. Document in Education navigator. HBO initial instruction. []   3 Patient Assessment and Planning Planning Definition Points Simple Multiple System Simple: Simple follow-up with routine assessment and planning. If Discharged, instructions and long term/follow-up care given to patient/caregiver. Discharged, instructions and/or After Visit Summary given to patient/caregiver and instructions completed. []   1 Intermediate Multiple System Intermediate: Contact with outside resources; i.e. Telephone calls to home health, [facility]. May include filling out forms and writing letters, arranging transportation, communication with insurance , vendors, etc.  Discharged, instructions and/or After Visit Summary given to patient/caregiver and instructions completed. [x]   2 Complex Multiple System Complex: Full, comprehensive assessment and planning. Follow the entire navigator under Wound Visit charting filling out each tab which includes OP Adm Database Screening, Education and CarePlan  HBO risk assessment completed. Discharged, instructions and/or After Visit Summary given to patient/caregiver and instructions completed. []   3 Is this the Patient's First Visit to the [address] [place] [address] Road No 
 
 
Is this Patient Established @ [facility] 
Yes Clinical Level of Care Points  0-2  Level 1 [] Points  3-5  Level 2 [x] Points  6-9  Level 3 [] Points  10-12  Level 4 [] Points  13-15  Level 5 [] Electronically signed by Darryle Poser, PT, [address] [facility] on 11/8/2019 at 1:55 PM

## 2019-11-08 NOTE — PROGRESS NOTES
11/08/19 1351 [REMOVED] Wound Ankle Right;Lateral  
Final Assessment Date/Final Assessment Time: 11/08/19 1352  Date First Assessed/Time First Assessed: 02/01/19 1450   Wound Type: Venous  Location: Ankle  Orientation: Right;Lateral  Wound Outcome: Healed Dressing Status  Clean, dry, and intact Dressing Type   
(Endoform, ABD, rolled gauze) Wound Length (cm) 0 cm Wound Width (cm) 0 cm Wound Depth (cm) 0 Wound Surface area (cm^2) 0 cm^2 Change in Wound Size % 100 Condition of Base Epithelializing Epithelialization (%) 100 Drainage Amount  None Wound Odor None Cleansing and Cleansing Agents  Soap and water Patient is currently taking Aspirin 81 mg

## 2019-11-08 NOTE — WOUND CARE
[address] [place] [address] [address] Phone: [phone] Fax: [phone] Patient: Romeo Gomez MRN: [id number]  SSN: [id number] YOB: 1923  Age: 80 y.o. Sex: female Return Appointment: Discharge from wound center at this time. Instructions: Right lateral lower leg Wound is healed. Patient may shower as previously. No need for dressing at this time. Discharge from wound center at this time. Apply moisturizer to right lower leg 1-2 times per day. Should you experience increased redness, swelling, pain, foul odor, size of wound(s), or have a temperature over 101 degrees please contact the [address] [place] [address] Road at [phone] or if after hours contact your primary care physician or go to the hospital emergency department. Signed By: Darlyn Spaulding PT, [facility] November 8, 2019

## 2019-11-13 NOTE — PROGRESS NOTES
Wound Center Progress Note Patient: Sanam Desouza MRN: [id number]  SSN: [id number] YOB: 1923  Age: 80 y.o. Sex: female Subjective: Chief Complaint: 
R ankle wound History of Present Illness:    
 
Wound Caused By: Zahra Albert Associated Signs and Symptoms: drainage, infection Timing: constant Quality: wound Severity: full thickness Modifying Factors: age, overall infirmity Has been on [place]. Has HH assisting. Family very attentive but live in [place]. 2/22/2019 Has been performing dressings herself. Discharged from [facility]. Lives alone. Unable to drive. Extremely poor eyesight. Cannot see wound to place dressings. 3/29/2019 Continue to due well with wound on the right, but has new wound on the left. Cannot recall any trauma. Not on any blood thinners. 4/12/2019 Did well with endoform. Denies any new wounds. Minimal range. 4/26/2019 Feel right side may be healed. No more falls or other trauma. 5/10/2017 Does not recall any trauma, but has developed new wounds on the left side. No changes in medications. No recalled falls. 6/14/2019 No new issues. More recent wound is healing. Initial remains closed. No further trauma. 6/28/2019 Had some pain with the wraps since last visit. Had to cut off. 
 
7/12/2019 Wraps tolerated since last. Not too tight. Feels have improved significantly. 8/9/2019 Patient and caregiver think much improved since last. No new issues. Tolerating wraps. No new issues. Left arm doing well. No falls. 10/11/2019 No new issues. Tolerating dressings. No pain. Some adherence with border foam and skin irritation . 11/8/2019 No further drainage or new wounds. Past Medical History:  
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  
 Skin cancer of scalp 2012  TIA (transient ischemic attack)  Unspecified hypothyroidism 10/31/2012 Past Surgical History:  
Procedure Laterality Date  HX APPENDECTOMY  HX COLONOSCOPY    
 HX MALIGNANT SKIN LESION EXCISION    
 scalp  RECTAL SENSATION TEST, BALLOON Family History Problem Relation Age of Onset  Hypertension Mother  Heart Disease Father  Breast Cancer Neg Hx Social History Tobacco Use  Smoking status: Never Smoker  Smokeless tobacco: Never Used Substance Use Topics  Alcohol use: No  
   
Prior to Admission medications Medication Sig Start Date End Date Taking? Authorizing Provider  
fluticasone propionate (FLONASE) 50 mcg/actuation nasal spray USE 2 SPRAYS IN EACH NOSTRIL ONCE DAILY 9/23/19   Kristin Hicks MD  
simvastatin (ZOCOR) 40 mg tablet TAKE <1> TABLET BY MOUTH NIGHTLY Patient taking differently: TAKE 1 TABLET BY MOUTH NIGHTLY 8/27/19   Louise Sanchez MD  
vits A,C,E/lutein/minerals (VISION FORMULA, WITH LUTEIN, PO) Take 1 Tab by mouth daily. Provider, Historical  
aspirin 81 mg chewable tablet Take 81 mg by mouth daily. Provider, Historical  
lubiPROStone (AMITIZA) 8 mcg capsule Take 1 Cap by mouth daily (with breakfast). 5/31/19   Louise Sanchez MD  
levothyroxine (SYNTHROID) 50 mcg tablet TAKE ONE (1) TABLET BY MOUTH DAILY. 3/15/19   Louise Sanchez MD  
traMADol (ULTRAM) 50 mg tablet Take 1 Tab by mouth every eight (8) hours as needed for Pain.  Max Daily Amount: 150 mg. 12/27/18   Jamar Gonzalez MD  
 enalapril (VASOTEC) 10 mg tablet Take 1 Tab by mouth two (2) times a day. 6/7/18   Louise Sal MD  
lidocaine (LIDODERM) 5 % 1 Patch by TransDERmal route every twenty-four (24) hours. Apply patch to the affected area for 12 hours a day and remove for 12 hours a day. 2/15/17   Louise Sal MD  
simethicone 125 mg chewable tablet Take 125 mg by mouth every six (6) hours as needed for Flatulence. 1/30/17   Rohan Solis MD  
brimonidine (ALPHAGAN P) 0.1 % ophthalmic solution Administer 1 Drop to right eye every eight (8) hours. Provider, Historical  
esomeprazole (NEXIUM) 40 mg capsule Take 40 mg by mouth daily. Provider, Historical  
brimonidine (ALPHAGAN) 0.15 % ophthalmic solution Administer 1 Drop to right eye three (3) times daily. Provider, Historical  
VIT C/E/ZN/COPPR/LUTEIN/ZEAXAN (OCUVITE LUTEIN & ZEAXANTHIN PO) Take 1 Tab by mouth daily. Provider, Historical  
acetaminophen (TYLENOL) 500 mg tablet Take 650 mg by mouth every six (6) hours as needed for Pain. Provider, Historical  
clobetasol (TEMOVATE) 0.05 % external solution Apply 1 Drop to affected area two (2) times a day. apply to left upper arm twice a day 4/1/14   Provider, Historical  
dorzolamide-timolol (COSOPT) 2-0.5 % ophthalmic solution Administer 1 Drop to both eyes two (2) times a day. 2/2/14   Provider, Historical  
ketoconazole (NIZORAL) 2 % shampoo Apply 1 mL to affected area daily. apply to hair and let sit for 3 min and rinse out 4/1/14   Provider, Historical  
TRAVATAN Z 0.004 % ophthalmic solution Administer 1 Drop to right eye nightly. 2/2/14   Provider, Historical  
calcium-cholecalciferol, d3, (CALCIUM 600 + D) 600-125 mg-unit Tab Take 1 Tab by mouth two (2) times a day. Provider, Historical  
polyethylene glycol (MIRALAX) 17 gram packet Take 17 g by mouth daily.   mixed with water or juice    Provider, Historical  
Cholecalciferol, Vitamin D3, (VITAMIN D3) 1,000 unit cap Take 1 Tab by mouth daily. Provider, Historical  
 
No Known Allergies Review of Systems: 
CONSTITUTIONAL: No fever, chills HEAD: No headache EYES: No visual loss ENT: No hearing loss SKIN: No rash CARDIOVASCULAR: No chest pain RESPIRATORY: No shortness of breath GASTROINTESTINAL: No nausea, vomiting GENITOURINARY: No excessive urination NEUROLOGICAL: + mild le weakness MUSCULOSKELETAL: No muscle pain. No neck pain HEMATOLOGIC: No easy bleeding LYMPHATICS: No lymphedema. PSYCHIATRIC: No current depression ENDOCRINOLOGIC: No high sugars ALLERGIES: No history of asthma, hives, eczema or rhinitis. No results found for: HBA1C, ZRR1GDTR, HGBE8, JJK4RZOC, CXT8MDTK, RGR8UALQ Immunization History Administered Date(s) Administered  Influenza High Dose Vaccine PF 10/12/2016, 11/29/2017, 12/05/2018  Influenza Vaccine 10/17/2013, 11/12/2014  Influenza Vaccine PF 09/10/2015  Influenza Vaccine Whole 09/06/2012  Pneumococcal Conjugate (PCV-13) 02/11/2015  Pneumococcal Polysaccharide (PPSV-23) 01/01/1993  TDAP Vaccine 05/31/2012  Tdap 05/31/2012  Zoster Vaccine, Live 11/13/2014 Body mass index is 19.11 kg/m². Current medications: 
Current Outpatient Medications Medication Sig Dispense Refill  fluticasone propionate (FLONASE) 50 mcg/actuation nasal spray USE 2 SPRAYS IN EACH NOSTRIL ONCE DAILY 3 Bottle 3  
 simvastatin (ZOCOR) 40 mg tablet TAKE <1> TABLET BY MOUTH NIGHTLY (Patient taking differently: TAKE 1 TABLET BY MOUTH NIGHTLY) 90 Tab 2  vits A,C,E/lutein/minerals (VISION FORMULA, WITH LUTEIN, PO) Take 1 Tab by mouth daily.  aspirin 81 mg chewable tablet Take 81 mg by mouth daily.  lubiPROStone (AMITIZA) 8 mcg capsule Take 1 Cap by mouth daily (with breakfast). 90 Cap 3  
 levothyroxine (SYNTHROID) 50 mcg tablet TAKE ONE (1) TABLET BY MOUTH DAILY.  90 Tab 2  
 traMADol (ULTRAM) 50 mg tablet Take 1 Tab by mouth every eight (8) hours as needed for Pain. Max Daily Amount: 150 mg. 30 Tab 0  
 enalapril (VASOTEC) 10 mg tablet Take 1 Tab by mouth two (2) times a day. 180 Tab 3  
 lidocaine (LIDODERM) 5 % 1 Patch by TransDERmal route every twenty-four (24) hours. Apply patch to the affected area for 12 hours a day and remove for 12 hours a day. 1 Each 5  
 simethicone 125 mg chewable tablet Take 125 mg by mouth every six (6) hours as needed for Flatulence. 60 Tab 0  
 brimonidine (ALPHAGAN P) 0.1 % ophthalmic solution Administer 1 Drop to right eye every eight (8) hours.  esomeprazole (NEXIUM) 40 mg capsule Take 40 mg by mouth daily.  brimonidine (ALPHAGAN) 0.15 % ophthalmic solution Administer 1 Drop to right eye three (3) times daily.  VIT C/E/ZN/COPPR/LUTEIN/ZEAXAN (OCUVITE LUTEIN & ZEAXANTHIN PO) Take 1 Tab by mouth daily.  acetaminophen (TYLENOL) 500 mg tablet Take 650 mg by mouth every six (6) hours as needed for Pain.  clobetasol (TEMOVATE) 0.05 % external solution Apply 1 Drop to affected area two (2) times a day. apply to left upper arm twice a day  dorzolamide-timolol (COSOPT) 2-0.5 % ophthalmic solution Administer 1 Drop to both eyes two (2) times a day.  ketoconazole (NIZORAL) 2 % shampoo Apply 1 mL to affected area daily. apply to hair and let sit for 3 min and rinse out  TRAVATAN Z 0.004 % ophthalmic solution Administer 1 Drop to right eye nightly.  calcium-cholecalciferol, d3, (CALCIUM 600 + D) 600-125 mg-unit Tab Take 1 Tab by mouth two (2) times a day.  polyethylene glycol (MIRALAX) 17 gram packet Take 17 g by mouth daily. mixed with water or juice  Cholecalciferol, Vitamin D3, (VITAMIN D3) 1,000 unit cap Take 1 Tab by mouth daily. Objective:  
 
Physical Exam:  
 
Visit Vitals /90 (BP 1 Location: Left arm) Pulse 68 Temp 97.8 °F (36.6 °C) Resp 18 Ht 5' 7\" (1.702 m) Wt 55.3 kg (122 lb) SpO2 95% BMI 19.11 kg/m² General: well developed thin appearing Psych: cooperative. Pleasant Neuro: alert and oriented to person/place/situation. Otherwise nonfocal. 
Derm: Normal turgor for age, dry skin HEENT: Normocephalic, atraumatic. EOMI. Neck: Normal range of motion. Chest: Respirations nonlabored Cardio: RRR Abdomen: Soft, nondistended Lower extremities: No hemosiderrosis + significant varicosities Capillary refill <3 sec Right 1+ DP/PT Left 1+ DP/PT Thin friable skin. Left wound ecchymoses, fully granulated. Left arm wound remains closed. Assessment/Plan:  
 
Essentially healed. Will discharge. Return for any recurrence.

## 2020-01-01 ENCOUNTER — APPOINTMENT (OUTPATIENT)
Dept: GENERAL RADIOLOGY | Age: 85
DRG: 562 | End: 2020-01-01
Attending: EMERGENCY MEDICINE
Payer: COMMERCIAL

## 2020-01-01 ENCOUNTER — HOSPITAL ENCOUNTER (INPATIENT)
Age: 85
LOS: 8 days | Discharge: NURSING, REHAB OR LONG-TERM CARE | DRG: 562 | End: 2020-05-09
Attending: EMERGENCY MEDICINE | Admitting: HOSPITALIST
Payer: COMMERCIAL

## 2020-01-01 ENCOUNTER — APPOINTMENT (OUTPATIENT)
Dept: CT IMAGING | Age: 85
DRG: 562 | End: 2020-01-01
Attending: EMERGENCY MEDICINE
Payer: COMMERCIAL

## 2020-01-01 ENCOUNTER — APPOINTMENT (OUTPATIENT)
Dept: MRI IMAGING | Age: 85
DRG: 562 | End: 2020-01-01
Attending: INTERNAL MEDICINE
Payer: COMMERCIAL

## 2020-01-01 ENCOUNTER — APPOINTMENT (OUTPATIENT)
Dept: GENERAL RADIOLOGY | Age: 85
End: 2020-01-01
Attending: EMERGENCY MEDICINE
Payer: COMMERCIAL

## 2020-01-01 ENCOUNTER — HOSPITAL ENCOUNTER (EMERGENCY)
Age: 85
Discharge: HOME | End: 2020-05-11
Attending: EMERGENCY MEDICINE
Payer: COMMERCIAL

## 2020-01-01 ENCOUNTER — APPOINTMENT (OUTPATIENT)
Dept: CT IMAGING | Age: 85
DRG: 562 | End: 2020-01-01
Attending: INTERNAL MEDICINE
Payer: COMMERCIAL

## 2020-01-01 VITALS
BODY MASS INDEX: 17.22 KG/M2 | HEART RATE: 92 BPM | SYSTOLIC BLOOD PRESSURE: 163 MMHG | TEMPERATURE: 98.1 F | OXYGEN SATURATION: 98 % | DIASTOLIC BLOOD PRESSURE: 83 MMHG | WEIGHT: 123 LBS | HEIGHT: 71 IN | RESPIRATION RATE: 20 BRPM

## 2020-01-01 VITALS
SYSTOLIC BLOOD PRESSURE: 122 MMHG | HEART RATE: 68 BPM | OXYGEN SATURATION: 95 % | DIASTOLIC BLOOD PRESSURE: 80 MMHG | RESPIRATION RATE: 16 BRPM | TEMPERATURE: 97.1 F

## 2020-01-01 VITALS
HEART RATE: 80 BPM | WEIGHT: 119.8 LBS | BODY MASS INDEX: 18.8 KG/M2 | RESPIRATION RATE: 20 BRPM | OXYGEN SATURATION: 97 % | SYSTOLIC BLOOD PRESSURE: 131 MMHG | DIASTOLIC BLOOD PRESSURE: 66 MMHG | TEMPERATURE: 98.7 F | HEIGHT: 67 IN

## 2020-01-01 DIAGNOSIS — E78.5 HYPERLIPIDEMIA, UNSPECIFIED HYPERLIPIDEMIA TYPE: ICD-10-CM

## 2020-01-01 DIAGNOSIS — S42.102A CLOSED FRACTURE OF LEFT SCAPULA, UNSPECIFIED PART OF SCAPULA, INITIAL ENCOUNTER: Primary | ICD-10-CM

## 2020-01-01 DIAGNOSIS — S22.32XA CLOSED FRACTURE OF ONE RIB OF LEFT SIDE, INITIAL ENCOUNTER: ICD-10-CM

## 2020-01-01 DIAGNOSIS — I10 HYPERTENSION, UNSPECIFIED TYPE: ICD-10-CM

## 2020-01-01 DIAGNOSIS — T83.511A URINARY TRACT INFECTION ASSOCIATED WITH INDWELLING URETHRAL CATHETER, INITIAL ENCOUNTER (HCC): Primary | ICD-10-CM

## 2020-01-01 DIAGNOSIS — N39.0 URINARY TRACT INFECTION ASSOCIATED WITH INDWELLING URETHRAL CATHETER, INITIAL ENCOUNTER (HCC): Primary | ICD-10-CM
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
## 2020-01-01 LAB
ALBUMIN SERPL-MCNC: 2.5 G/DL (ref 3.2–4.6)
ALBUMIN SERPL-MCNC: 2.6 G/DL (ref 3.2–4.6)
ALBUMIN SERPL-MCNC: 3 G/DL (ref 3.2–4.6)
ALBUMIN SERPL-MCNC: 3 G/DL (ref 3.2–4.6)
ALBUMIN SERPL-MCNC: 3.4 G/DL (ref 3.2–4.6)
ALBUMIN/GLOB SERPL: 0.6 {RATIO} (ref 1.2–3.5)
ALBUMIN/GLOB SERPL: 0.8 {RATIO} (ref 1.2–3.5)
ALBUMIN/GLOB SERPL: 0.9 {RATIO} (ref 1.2–3.5)
ALBUMIN/GLOB SERPL: 0.9 {RATIO} (ref 1.2–3.5)
ALBUMIN/GLOB SERPL: 1 {RATIO} (ref 1.2–3.5)
ALP SERPL-CCNC: 64 U/L (ref 50–136)
ALP SERPL-CCNC: 69 U/L (ref 50–136)
ALP SERPL-CCNC: 73 U/L (ref 50–136)
ALP SERPL-CCNC: 73 U/L (ref 50–136)
ALP SERPL-CCNC: 77 U/L (ref 50–136)
ALT SERPL-CCNC: 19 U/L (ref 12–65)
ALT SERPL-CCNC: 20 U/L (ref 12–65)
ALT SERPL-CCNC: 23 U/L (ref 12–65)
ALT SERPL-CCNC: 25 U/L (ref 12–65)
ALT SERPL-CCNC: 30 U/L (ref 12–65)
AMORPH CRY URNS QL MICRO: ABNORMAL
AMORPH CRY URNS QL MICRO: ABNORMAL
ANION GAP SERPL CALC-SCNC: 10 MMOL/L (ref 7–16)
ANION GAP SERPL CALC-SCNC: 10 MMOL/L (ref 7–16)
ANION GAP SERPL CALC-SCNC: 12 MMOL/L (ref 7–16)
ANION GAP SERPL CALC-SCNC: 5 MMOL/L (ref 7–16)
ANION GAP SERPL CALC-SCNC: 6 MMOL/L (ref 7–16)
ANION GAP SERPL CALC-SCNC: 7 MMOL/L (ref 7–16)
ANION GAP SERPL CALC-SCNC: 9 MMOL/L (ref 7–16)
APPEARANCE UR: CLEAR
AST SERPL-CCNC: 21 U/L (ref 15–37)
AST SERPL-CCNC: 29 U/L (ref 15–37)
AST SERPL-CCNC: 41 U/L (ref 15–37)
AST SERPL-CCNC: 43 U/L (ref 15–37)
AST SERPL-CCNC: 64 U/L (ref 15–37)
ATRIAL RATE: 80 BPM
BACTERIA SPEC CULT: ABNORMAL
BACTERIA SPEC CULT: ABNORMAL
BACTERIA URNS QL MICRO: 0 /HPF
BACTERIA URNS QL MICRO: ABNORMAL /HPF
BACTERIA URNS QL MICRO: ABNORMAL /HPF
BASOPHILS # BLD: 0 K/UL (ref 0–0.2)
BASOPHILS NFR BLD: 0 % (ref 0–2)
BILIRUB SERPL-MCNC: 0.8 MG/DL (ref 0.2–1.1)
BILIRUB SERPL-MCNC: 1 MG/DL (ref 0.2–1.1)
BILIRUB SERPL-MCNC: 1.2 MG/DL (ref 0.2–1.1)
BILIRUB SERPL-MCNC: 1.5 MG/DL (ref 0.2–1.1)
BILIRUB SERPL-MCNC: 2.1 MG/DL (ref 0.2–1.1)
BILIRUB UR QL: NEGATIVE
BNP SERPL-MCNC: 2393 PG/ML
BUN SERPL-MCNC: 25 MG/DL (ref 8–23)
BUN SERPL-MCNC: 25 MG/DL (ref 8–23)
BUN SERPL-MCNC: 27 MG/DL (ref 8–23)
BUN SERPL-MCNC: 27 MG/DL (ref 8–23)
BUN SERPL-MCNC: 32 MG/DL (ref 8–23)
BUN SERPL-MCNC: 37 MG/DL (ref 8–23)
BUN SERPL-MCNC: 44 MG/DL (ref 8–23)
CALCIUM SERPL-MCNC: 8.3 MG/DL (ref 8.3–10.4)
CALCIUM SERPL-MCNC: 8.3 MG/DL (ref 8.3–10.4)
CALCIUM SERPL-MCNC: 8.7 MG/DL (ref 8.3–10.4)
CALCIUM SERPL-MCNC: 8.8 MG/DL (ref 8.3–10.4)
CALCIUM SERPL-MCNC: 9 MG/DL (ref 8.3–10.4)
CALCIUM SERPL-MCNC: 9.1 MG/DL (ref 8.3–10.4)
CALCIUM SERPL-MCNC: 9.3 MG/DL (ref 8.3–10.4)
CALCULATED P AXIS, ECG09: 0 DEGREES
CALCULATED R AXIS, ECG10: 19 DEGREES
CALCULATED T AXIS, ECG11: 133 DEGREES
CASTS URNS QL MICRO: 0 /LPF
CHLORIDE SERPL-SCNC: 103 MMOL/L (ref 98–107)
CHLORIDE SERPL-SCNC: 104 MMOL/L (ref 98–107)
CHLORIDE SERPL-SCNC: 104 MMOL/L (ref 98–107)
CHLORIDE SERPL-SCNC: 106 MMOL/L (ref 98–107)
CHLORIDE SERPL-SCNC: 109 MMOL/L (ref 98–107)
CHLORIDE SERPL-SCNC: 111 MMOL/L (ref 98–107)
CHLORIDE SERPL-SCNC: 113 MMOL/L (ref 98–107)
CK SERPL-CCNC: 193 U/L (ref 21–215)
CO2 SERPL-SCNC: 24 MMOL/L (ref 21–32)
CO2 SERPL-SCNC: 25 MMOL/L (ref 21–32)
CO2 SERPL-SCNC: 26 MMOL/L (ref 21–32)
CO2 SERPL-SCNC: 26 MMOL/L (ref 21–32)
CO2 SERPL-SCNC: 27 MMOL/L (ref 21–32)
CO2 SERPL-SCNC: 27 MMOL/L (ref 21–32)
CO2 SERPL-SCNC: 31 MMOL/L (ref 21–32)
COLOR UR: YELLOW
CREAT SERPL-MCNC: 0.6 MG/DL (ref 0.6–1)
CREAT SERPL-MCNC: 0.66 MG/DL (ref 0.6–1)
CREAT SERPL-MCNC: 0.67 MG/DL (ref 0.6–1)
CREAT SERPL-MCNC: 0.73 MG/DL (ref 0.6–1)
CREAT SERPL-MCNC: 0.75 MG/DL (ref 0.6–1)
CREAT SERPL-MCNC: 0.77 MG/DL (ref 0.6–1)
CREAT SERPL-MCNC: 0.86 MG/DL (ref 0.6–1)
CRYSTALS URNS QL MICRO: 0 /LPF
CRYSTALS URNS QL MICRO: ABNORMAL /LPF
DIAGNOSIS, 93000: NORMAL
DIFFERENTIAL METHOD BLD: ABNORMAL
EMERGENT DISEASE PANEL, EDPR: NOT DETECTED
EOSINOPHIL # BLD: 0 K/UL (ref 0–0.8)
EOSINOPHIL NFR BLD: 0 % (ref 0.5–7.8)
EPI CELLS #/AREA URNS HPF: 0 /HPF
EPI CELLS #/AREA URNS HPF: 0 /HPF
EPI CELLS #/AREA URNS HPF: ABNORMAL /HPF
ERYTHROCYTE [DISTWIDTH] IN BLOOD BY AUTOMATED COUNT: 14.1 % (ref 11.9–14.6)
ERYTHROCYTE [DISTWIDTH] IN BLOOD BY AUTOMATED COUNT: 14.3 % (ref 11.9–14.6)
ERYTHROCYTE [DISTWIDTH] IN BLOOD BY AUTOMATED COUNT: 14.5 % (ref 11.9–14.6)
ERYTHROCYTE [DISTWIDTH] IN BLOOD BY AUTOMATED COUNT: 14.7 % (ref 11.9–14.6)
ERYTHROCYTE [DISTWIDTH] IN BLOOD BY AUTOMATED COUNT: 15.1 % (ref 11.9–14.6)
GLOBULIN SER CALC-MCNC: 3.3 G/DL (ref 2.3–3.5)
GLOBULIN SER CALC-MCNC: 4.3 G/DL (ref 2.3–3.5)
GLUCOSE SERPL-MCNC: 110 MG/DL (ref 65–100)
GLUCOSE SERPL-MCNC: 110 MG/DL (ref 65–100)
GLUCOSE SERPL-MCNC: 118 MG/DL (ref 65–100)
GLUCOSE SERPL-MCNC: 135 MG/DL (ref 65–100)
GLUCOSE SERPL-MCNC: 75 MG/DL (ref 65–100)
GLUCOSE SERPL-MCNC: 80 MG/DL (ref 65–100)
GLUCOSE SERPL-MCNC: 93 MG/DL (ref 65–100)
GLUCOSE UR STRIP.AUTO-MCNC: NEGATIVE MG/DL
HCT VFR BLD AUTO: 27.4 % (ref 35.8–46.3)
HCT VFR BLD AUTO: 30.5 % (ref 35.8–46.3)
HCT VFR BLD AUTO: 32.3 % (ref 35.8–46.3)
HCT VFR BLD AUTO: 32.3 % (ref 35.8–46.3)
HCT VFR BLD AUTO: 33.1 % (ref 35.8–46.3)
HCT VFR BLD AUTO: 34.8 % (ref 35.8–46.3)
HCT VFR BLD AUTO: 36.6 % (ref 35.8–46.3)
HGB BLD-MCNC: 10 G/DL (ref 11.7–15.4)
HGB BLD-MCNC: 10.3 G/DL (ref 11.7–15.4)
HGB BLD-MCNC: 10.4 G/DL (ref 11.7–15.4)
HGB BLD-MCNC: 10.7 G/DL (ref 11.7–15.4)
HGB BLD-MCNC: 11.1 G/DL (ref 11.7–15.4)
HGB BLD-MCNC: 11.5 G/DL (ref 11.7–15.4)
HGB BLD-MCNC: 9.2 G/DL (ref 11.7–15.4)
HGB UR QL STRIP: NEGATIVE
IMM GRANULOCYTES # BLD AUTO: 0 K/UL (ref 0–0.5)
IMM GRANULOCYTES # BLD AUTO: 0.1 K/UL (ref 0–0.5)
IMM GRANULOCYTES NFR BLD AUTO: 0 % (ref 0–5)
IMM GRANULOCYTES NFR BLD AUTO: 1 % (ref 0–5)
KETONES UR QL STRIP.AUTO: NEGATIVE MG/DL
LACTATE SERPL-SCNC: 1.4 MMOL/L (ref 0.4–2)
LEUKOCYTE ESTERASE UR QL STRIP.AUTO: NEGATIVE
LYMPHOCYTES # BLD: 0.5 K/UL (ref 0.5–4.6)
LYMPHOCYTES # BLD: 0.6 K/UL (ref 0.5–4.6)
LYMPHOCYTES # BLD: 0.7 K/UL (ref 0.5–4.6)
LYMPHOCYTES # BLD: 0.9 K/UL (ref 0.5–4.6)
LYMPHOCYTES NFR BLD: 12 % (ref 13–44)
LYMPHOCYTES NFR BLD: 6 % (ref 13–44)
LYMPHOCYTES NFR BLD: 7 % (ref 13–44)
LYMPHOCYTES NFR BLD: 9 % (ref 13–44)
MAGNESIUM SERPL-MCNC: 2 MG/DL (ref 1.8–2.4)
MCH RBC QN AUTO: 35.1 PG (ref 26.1–32.9)
MCH RBC QN AUTO: 35.2 PG (ref 26.1–32.9)
MCH RBC QN AUTO: 35.5 PG (ref 26.1–32.9)
MCH RBC QN AUTO: 35.7 PG (ref 26.1–32.9)
MCH RBC QN AUTO: 36.5 PG (ref 26.1–32.9)
MCHC RBC AUTO-ENTMCNC: 31.4 G/DL (ref 31.4–35)
MCHC RBC AUTO-ENTMCNC: 31.9 G/DL (ref 31.4–35)
MCHC RBC AUTO-ENTMCNC: 31.9 G/DL (ref 31.4–35)
MCHC RBC AUTO-ENTMCNC: 32.2 G/DL (ref 31.4–35)
MCHC RBC AUTO-ENTMCNC: 32.3 G/DL (ref 31.4–35)
MCHC RBC AUTO-ENTMCNC: 32.8 G/DL (ref 31.4–35)
MCHC RBC AUTO-ENTMCNC: 33.6 G/DL (ref 31.4–35)
MCV RBC AUTO: 108.2 FL (ref 79.6–97.8)
MCV RBC AUTO: 108.7 FL (ref 79.6–97.8)
MCV RBC AUTO: 109.1 FL (ref 79.6–97.8)
MCV RBC AUTO: 110.3 FL (ref 79.6–97.8)
MCV RBC AUTO: 110.5 FL (ref 79.6–97.8)
MCV RBC AUTO: 111.4 FL (ref 79.6–97.8)
MCV RBC AUTO: 113 FL (ref 79.6–97.8)
MM INDURATION POC: 0 MM (ref 0–5)
MONOCYTES # BLD: 0.7 K/UL (ref 0.1–1.3)
MONOCYTES # BLD: 0.7 K/UL (ref 0.1–1.3)
MONOCYTES # BLD: 0.8 K/UL (ref 0.1–1.3)
MONOCYTES # BLD: 0.9 K/UL (ref 0.1–1.3)
MONOCYTES NFR BLD: 10 % (ref 4–12)
MONOCYTES NFR BLD: 12 % (ref 4–12)
MONOCYTES NFR BLD: 8 % (ref 4–12)
MONOCYTES NFR BLD: 8 % (ref 4–12)
MUCOUS THREADS URNS QL MICRO: 0 /LPF
MUCOUS THREADS URNS QL MICRO: 0 /LPF
MYOGLOBIN SERPL-MCNC: 860 NG/ML (ref 9–82)
NEUTS SEG # BLD: 5.2 K/UL (ref 1.7–8.2)
NEUTS SEG # BLD: 5.6 K/UL (ref 1.7–8.2)
NEUTS SEG # BLD: 6.6 K/UL (ref 1.7–8.2)
NEUTS SEG # BLD: 8.7 K/UL (ref 1.7–8.2)
NEUTS SEG NFR BLD: 76 % (ref 43–78)
NEUTS SEG NFR BLD: 78 % (ref 43–78)
NEUTS SEG NFR BLD: 84 % (ref 43–78)
NEUTS SEG NFR BLD: 84 % (ref 43–78)
NITRITE UR QL STRIP.AUTO: NEGATIVE
NRBC # BLD: 0 K/UL (ref 0–0.2)
NRBC # BLD: 0.02 K/UL (ref 0–0.2)
OTHER OBSERVATIONS,UCOM: ABNORMAL
OTHER OBSERVATIONS,UCOM: ABNORMAL
P-R INTERVAL, ECG05: 144 MS
PH UR STRIP: 6.5 [PH] (ref 5–9)
PLATELET # BLD AUTO: 141 K/UL (ref 150–450)
PLATELET # BLD AUTO: 151 K/UL (ref 150–450)
PLATELET # BLD AUTO: 152 K/UL (ref 150–450)
PLATELET # BLD AUTO: 155 K/UL (ref 150–450)
PLATELET # BLD AUTO: 170 K/UL (ref 150–450)
PLATELET # BLD AUTO: 189 K/UL (ref 150–450)
PLATELET # BLD AUTO: 213 K/UL (ref 150–450)
PMV BLD AUTO: 12.1 FL (ref 9.4–12.3)
PMV BLD AUTO: 12.2 FL (ref 9.4–12.3)
PMV BLD AUTO: 12.3 FL (ref 9.4–12.3)
PMV BLD AUTO: 12.4 FL (ref 9.4–12.3)
PMV BLD AUTO: 12.4 FL (ref 9.4–12.3)
PMV BLD AUTO: 12.5 FL (ref 9.4–12.3)
PMV BLD AUTO: 12.9 FL (ref 9.4–12.3)
POTASSIUM SERPL-SCNC: 3.7 MMOL/L (ref 3.5–5.1)
POTASSIUM SERPL-SCNC: 3.8 MMOL/L (ref 3.5–5.1)
POTASSIUM SERPL-SCNC: 3.8 MMOL/L (ref 3.5–5.1)
POTASSIUM SERPL-SCNC: 4 MMOL/L (ref 3.5–5.1)
POTASSIUM SERPL-SCNC: 4 MMOL/L (ref 3.5–5.1)
POTASSIUM SERPL-SCNC: 4.4 MMOL/L (ref 3.5–5.1)
POTASSIUM SERPL-SCNC: 5.1 MMOL/L (ref 3.5–5.1)
PPD POC: NEGATIVE NEGATIVE
PROT SERPL-MCNC: 5.9 G/DL (ref 6.3–8.2)
PROT SERPL-MCNC: 6.3 G/DL (ref 6.3–8.2)
PROT SERPL-MCNC: 6.3 G/DL (ref 6.3–8.2)
PROT SERPL-MCNC: 6.7 G/DL (ref 6.3–8.2)
PROT SERPL-MCNC: 6.8 G/DL (ref 6.3–8.2)
PROT UR STRIP-MCNC: ABNORMAL MG/DL
Q-T INTERVAL, ECG07: 348 MS
QRS DURATION, ECG06: 86 MS
QTC CALCULATION (BEZET), ECG08: 401 MS
RBC # BLD AUTO: 2.52 M/UL (ref 4.05–5.2)
RBC # BLD AUTO: 2.82 M/UL (ref 4.05–5.2)
RBC # BLD AUTO: 2.9 M/UL (ref 4.05–5.2)
RBC # BLD AUTO: 2.96 M/UL (ref 4.05–5.2)
RBC # BLD AUTO: 3 M/UL (ref 4.05–5.2)
RBC # BLD AUTO: 3.15 M/UL (ref 4.05–5.2)
RBC # BLD AUTO: 3.24 M/UL (ref 4.05–5.2)
RBC #/AREA URNS HPF: 0 /HPF
RBC #/AREA URNS HPF: ABNORMAL /HPF
RBC #/AREA URNS HPF: ABNORMAL /HPF
SERVICE CMNT-IMP: ABNORMAL
SODIUM SERPL-SCNC: 138 MMOL/L (ref 136–145)
SODIUM SERPL-SCNC: 138 MMOL/L (ref 136–145)
SODIUM SERPL-SCNC: 139 MMOL/L (ref 136–145)
SODIUM SERPL-SCNC: 139 MMOL/L (ref 136–145)
SODIUM SERPL-SCNC: 147 MMOL/L (ref 136–145)
SP GR UR REFRACTOMETRY: 1.01 (ref 1–1.02)
UROBILINOGEN UR QL STRIP.AUTO: 1 EU/DL (ref 0.2–1)
VENTRICULAR RATE, ECG03: 80 BPM
WBC # BLD AUTO: 10.4 K/UL (ref 4.3–11.1)
WBC # BLD AUTO: 6.7 K/UL (ref 4.3–11.1)
WBC # BLD AUTO: 6.9 K/UL (ref 4.3–11.1)
WBC # BLD AUTO: 6.9 K/UL (ref 4.3–11.1)
WBC # BLD AUTO: 7.2 K/UL (ref 4.3–11.1)
WBC # BLD AUTO: 7.9 K/UL (ref 4.3–11.1)
WBC # BLD AUTO: 9.8 K/UL (ref 4.3–11.1)
WBC URNS QL MICRO: 0 /HPF
WBC URNS QL MICRO: ABNORMAL /HPF
WBC URNS QL MICRO: ABNORMAL /HPF

## 2020-01-01 PROCEDURE — 94760 N-INVAS EAR/PLS OXIMETRY 1: CPT

## 2020-01-01 PROCEDURE — 36415 COLL VENOUS BLD VENIPUNCTURE: CPT

## 2020-01-01 PROCEDURE — 83605 ASSAY OF LACTIC ACID: CPT

## 2020-01-01 PROCEDURE — 74011000250 HC RX REV CODE- 250: Performed by: HOSPITALIST

## 2020-01-01 PROCEDURE — 77010033678 HC OXYGEN DAILY

## 2020-01-01 PROCEDURE — 74011250637 HC RX REV CODE- 250/637: Performed by: INTERNAL MEDICINE

## 2020-01-01 PROCEDURE — 73030 X-RAY EXAM OF SHOULDER: CPT

## 2020-01-01 PROCEDURE — 74011250636 HC RX REV CODE- 250/636: Performed by: FAMILY MEDICINE

## 2020-01-01 PROCEDURE — 73562 X-RAY EXAM OF KNEE 3: CPT

## 2020-01-01 PROCEDURE — 74011250636 HC RX REV CODE- 250/636: Performed by: INTERNAL MEDICINE

## 2020-01-01 PROCEDURE — 80048 BASIC METABOLIC PNL TOTAL CA: CPT

## 2020-01-01 PROCEDURE — 82550 ASSAY OF CK (CPK): CPT

## 2020-01-01 PROCEDURE — 74011000302 HC RX REV CODE- 302: Performed by: HOSPITALIST

## 2020-01-01 PROCEDURE — 74011636320 HC RX REV CODE- 636/320: Performed by: INTERNAL MEDICINE

## 2020-01-01 PROCEDURE — 71045 X-RAY EXAM CHEST 1 VIEW: CPT

## 2020-01-01 PROCEDURE — 70450 CT HEAD/BRAIN W/O DYE: CPT

## 2020-01-01 PROCEDURE — 51798 US URINE CAPACITY MEASURE: CPT

## 2020-01-01 PROCEDURE — 74011000258 HC RX REV CODE- 258: Performed by: INTERNAL MEDICINE

## 2020-01-01 PROCEDURE — 71260 CT THORAX DX C+: CPT

## 2020-01-01 PROCEDURE — 86580 TB INTRADERMAL TEST: CPT | Performed by: HOSPITALIST

## 2020-01-01 PROCEDURE — 85025 COMPLETE CBC W/AUTO DIFF WBC: CPT

## 2020-01-01 PROCEDURE — 65270000029 HC RM PRIVATE

## 2020-01-01 PROCEDURE — 97530 THERAPEUTIC ACTIVITIES: CPT | Performed by: PHYSICAL THERAPIST

## 2020-01-01 PROCEDURE — 87186 SC STD MICRODIL/AGAR DIL: CPT

## 2020-01-01 PROCEDURE — 97161 PT EVAL LOW COMPLEX 20 MIN: CPT

## 2020-01-01 PROCEDURE — 77030040830 HC CATH URETH FOL MDII -A

## 2020-01-01 PROCEDURE — 99284 EMERGENCY DEPT VISIT MOD MDM: CPT

## 2020-01-01 PROCEDURE — 74011250637 HC RX REV CODE- 250/637: Performed by: HOSPITALIST

## 2020-01-01 PROCEDURE — 97165 OT EVAL LOW COMPLEX 30 MIN: CPT

## 2020-01-01 PROCEDURE — 80053 COMPREHEN METABOLIC PANEL: CPT

## 2020-01-01 PROCEDURE — 81015 MICROSCOPIC EXAM OF URINE: CPT

## 2020-01-01 PROCEDURE — 97530 THERAPEUTIC ACTIVITIES: CPT

## 2020-01-01 PROCEDURE — 51702 INSERT TEMP BLADDER CATH: CPT

## 2020-01-01 PROCEDURE — 83880 ASSAY OF NATRIURETIC PEPTIDE: CPT

## 2020-01-01 PROCEDURE — 83735 ASSAY OF MAGNESIUM: CPT

## 2020-01-01 PROCEDURE — 85027 COMPLETE CBC AUTOMATED: CPT

## 2020-01-01 PROCEDURE — 83874 ASSAY OF MYOGLOBIN: CPT

## 2020-01-01 PROCEDURE — 70551 MRI BRAIN STEM W/O DYE: CPT

## 2020-01-01 PROCEDURE — 92610 EVALUATE SWALLOWING FUNCTION: CPT

## 2020-01-01 PROCEDURE — 74011250636 HC RX REV CODE- 250/636: Performed by: HOSPITALIST

## 2020-01-01 PROCEDURE — 81003 URINALYSIS AUTO W/O SCOPE: CPT

## 2020-01-01 PROCEDURE — 74011250636 HC RX REV CODE- 250/636: Performed by: EMERGENCY MEDICINE

## 2020-01-01 PROCEDURE — 73060 X-RAY EXAM OF HUMERUS: CPT

## 2020-01-01 PROCEDURE — 77030038269 HC DRN EXT URIN PURWCK BARD -A

## 2020-01-01 PROCEDURE — 3331090002 HH PPS REVENUE DEBIT

## 2020-01-01 PROCEDURE — 96374 THER/PROPH/DIAG INJ IV PUSH: CPT

## 2020-01-01 PROCEDURE — 74011000250 HC RX REV CODE- 250: Performed by: INTERNAL MEDICINE

## 2020-01-01 PROCEDURE — 87088 URINE BACTERIA CULTURE: CPT

## 2020-01-01 PROCEDURE — 97112 NEUROMUSCULAR REEDUCATION: CPT

## 2020-01-01 PROCEDURE — 87086 URINE CULTURE/COLONY COUNT: CPT

## 2020-01-01 PROCEDURE — 96375 TX/PRO/DX INJ NEW DRUG ADDON: CPT

## 2020-01-01 PROCEDURE — 3331090001 HH PPS REVENUE CREDIT

## 2020-01-01 PROCEDURE — 93005 ELECTROCARDIOGRAM TRACING: CPT | Performed by: EMERGENCY MEDICINE

## 2020-01-01 RX ORDER — CEPHALEXIN 500 MG/1
500 CAPSULE ORAL 2 TIMES DAILY
Qty: 14 CAP | Refills: 0 | Status: SHIPPED | OUTPATIENT
Start: 2020-01-01 | End: 2020-01-01

## 2020-01-01 RX ORDER — LUBIPROSTONE 8 UG/1
8 CAPSULE, GELATIN COATED ORAL DAILY
Status: DISCONTINUED | OUTPATIENT
Start: 2020-01-01 | End: 2020-01-01 | Stop reason: HOSPADM

## 2020-01-01 RX ORDER — METOPROLOL TARTRATE 5 MG/5ML
5 INJECTION INTRAVENOUS
Status: DISCONTINUED | OUTPATIENT
Start: 2020-01-01 | End: 2020-01-01 | Stop reason: HOSPADM

## 2020-01-01 RX ORDER — FAMOTIDINE 20 MG/1
20 TABLET, FILM COATED ORAL DAILY
Status: DISCONTINUED | OUTPATIENT
Start: 2020-01-01 | End: 2020-01-01 | Stop reason: HOSPADM

## 2020-01-01 RX ORDER — IBUPROFEN 400 MG/1
400 TABLET ORAL
Qty: 30 TAB | Refills: 0 | Status: SHIPPED
Start: 2020-01-01

## 2020-01-01 RX ORDER — NALOXONE HYDROCHLORIDE 0.4 MG/ML
0.4 INJECTION, SOLUTION INTRAMUSCULAR; INTRAVENOUS; SUBCUTANEOUS AS NEEDED
Status: DISCONTINUED | OUTPATIENT
Start: 2020-01-01 | End: 2020-01-01 | Stop reason: HOSPADM

## 2020-01-01 RX ORDER — MORPHINE SULFATE 2 MG/ML
2 INJECTION, SOLUTION INTRAMUSCULAR; INTRAVENOUS
Status: COMPLETED | OUTPATIENT
Start: 2020-01-01 | End: 2020-01-01

## 2020-01-01 RX ORDER — SIMETHICONE 80 MG
125 TABLET,CHEWABLE ORAL
Status: DISCONTINUED | OUTPATIENT
Start: 2020-01-01 | End: 2020-01-01 | Stop reason: HOSPADM

## 2020-01-01 RX ORDER — HALOPERIDOL 5 MG/ML
2 INJECTION INTRAMUSCULAR ONCE
Status: DISCONTINUED | OUTPATIENT
Start: 2020-01-01 | End: 2020-01-01

## 2020-01-01 RX ORDER — DEXTROSE MONOHYDRATE 50 MG/ML
86.5 INJECTION, SOLUTION INTRAVENOUS CONTINUOUS
Status: DISPENSED | OUTPATIENT
Start: 2020-01-01 | End: 2020-01-01

## 2020-01-01 RX ORDER — SODIUM CHLORIDE 0.9 % (FLUSH) 0.9 %
10 SYRINGE (ML) INJECTION
Status: COMPLETED | OUTPATIENT
Start: 2020-01-01 | End: 2020-01-01

## 2020-01-01 RX ORDER — SODIUM CHLORIDE, SODIUM LACTATE, POTASSIUM CHLORIDE, CALCIUM CHLORIDE 600; 310; 30; 20 MG/100ML; MG/100ML; MG/100ML; MG/100ML
100 INJECTION, SOLUTION INTRAVENOUS CONTINUOUS
Status: DISCONTINUED | OUTPATIENT
Start: 2020-01-01 | End: 2020-01-01

## 2020-01-01 RX ORDER — DORZOLAMIDE HYDROCHLORIDE AND TIMOLOL MALEATE 20; 5 MG/ML; MG/ML
1 SOLUTION/ DROPS OPHTHALMIC 2 TIMES DAILY
Status: DISCONTINUED | OUTPATIENT
Start: 2020-01-01 | End: 2020-01-01 | Stop reason: HOSPADM

## 2020-01-01 RX ORDER — ONDANSETRON 2 MG/ML
4 INJECTION INTRAMUSCULAR; INTRAVENOUS
Status: DISCONTINUED | OUTPATIENT
Start: 2020-01-01 | End: 2020-01-01 | Stop reason: HOSPADM

## 2020-01-01 RX ORDER — FLUTICASONE PROPIONATE 50 MCG
2 SPRAY, SUSPENSION (ML) NASAL DAILY
Status: DISCONTINUED | OUTPATIENT
Start: 2020-01-01 | End: 2020-01-01 | Stop reason: HOSPADM

## 2020-01-01 RX ORDER — HALOPERIDOL 5 MG/ML
1 INJECTION INTRAMUSCULAR ONCE
Status: COMPLETED | OUTPATIENT
Start: 2020-01-01 | End: 2020-01-01

## 2020-01-01 RX ORDER — HYDROMORPHONE HYDROCHLORIDE 1 MG/ML
1 INJECTION, SOLUTION INTRAMUSCULAR; INTRAVENOUS; SUBCUTANEOUS ONCE
Status: COMPLETED | OUTPATIENT
Start: 2020-01-01 | End: 2020-01-01

## 2020-01-01 RX ORDER — HYDROMORPHONE HYDROCHLORIDE 1 MG/ML
0.5 INJECTION, SOLUTION INTRAMUSCULAR; INTRAVENOUS; SUBCUTANEOUS
Status: DISCONTINUED | OUTPATIENT
Start: 2020-01-01 | End: 2020-01-01

## 2020-01-01 RX ORDER — HALOPERIDOL 5 MG/ML
3 INJECTION INTRAMUSCULAR
Status: DISCONTINUED | OUTPATIENT
Start: 2020-01-01 | End: 2020-01-01

## 2020-01-01 RX ORDER — GUAIFENESIN 100 MG/5ML
81 LIQUID (ML) ORAL DAILY
Status: DISCONTINUED | OUTPATIENT
Start: 2020-01-01 | End: 2020-01-01 | Stop reason: HOSPADM

## 2020-01-01 RX ORDER — TRAMADOL HYDROCHLORIDE 50 MG/1
50 TABLET ORAL
Status: DISCONTINUED | OUTPATIENT
Start: 2020-01-01 | End: 2020-01-01 | Stop reason: SDUPTHER

## 2020-01-01 RX ORDER — MORPHINE SULFATE 2 MG/ML
2 INJECTION, SOLUTION INTRAMUSCULAR; INTRAVENOUS ONCE
Status: COMPLETED | OUTPATIENT
Start: 2020-01-01 | End: 2020-01-01

## 2020-01-01 RX ORDER — SODIUM CHLORIDE 0.9 % (FLUSH) 0.9 %
5-40 SYRINGE (ML) INJECTION AS NEEDED
Status: DISCONTINUED | OUTPATIENT
Start: 2020-01-01 | End: 2020-01-01 | Stop reason: HOSPADM

## 2020-01-01 RX ORDER — ACETAMINOPHEN 325 MG/1
650 TABLET ORAL
Status: DISCONTINUED | OUTPATIENT
Start: 2020-01-01 | End: 2020-01-01 | Stop reason: HOSPADM

## 2020-01-01 RX ORDER — SODIUM CHLORIDE 0.9 % (FLUSH) 0.9 %
5-40 SYRINGE (ML) INJECTION EVERY 8 HOURS
Status: DISCONTINUED | OUTPATIENT
Start: 2020-01-01 | End: 2020-01-01 | Stop reason: HOSPADM

## 2020-01-01 RX ORDER — HALOPERIDOL 5 MG/ML
5 INJECTION INTRAMUSCULAR ONCE
Status: COMPLETED | OUTPATIENT
Start: 2020-01-01 | End: 2020-01-01

## 2020-01-01 RX ORDER — CLOBETASOL PROPIONATE 0.46 MG/ML
1 SOLUTION TOPICAL 2 TIMES DAILY
Status: DISCONTINUED | OUTPATIENT
Start: 2020-01-01 | End: 2020-01-01

## 2020-01-01 RX ORDER — HYDROCODONE BITARTRATE AND ACETAMINOPHEN 5; 325 MG/1; MG/1
1 TABLET ORAL
Status: DISCONTINUED | OUTPATIENT
Start: 2020-01-01 | End: 2020-01-01

## 2020-01-01 RX ORDER — DIVALPROEX SODIUM 125 MG/1
125 CAPSULE, COATED PELLETS ORAL 3 TIMES DAILY
Status: DISCONTINUED | OUTPATIENT
Start: 2020-01-01 | End: 2020-01-01 | Stop reason: HOSPADM

## 2020-01-01 RX ORDER — FUROSEMIDE 10 MG/ML
10 INJECTION INTRAMUSCULAR; INTRAVENOUS ONCE
Status: DISCONTINUED | OUTPATIENT
Start: 2020-01-01 | End: 2020-01-01

## 2020-01-01 RX ORDER — FERROUS SULFATE, DRIED 160(50) MG
1 TABLET, EXTENDED RELEASE ORAL 2 TIMES DAILY
Status: DISCONTINUED | OUTPATIENT
Start: 2020-01-01 | End: 2020-01-01 | Stop reason: HOSPADM

## 2020-01-01 RX ORDER — ATORVASTATIN CALCIUM 20 MG/1
20 TABLET, FILM COATED ORAL
Status: DISCONTINUED | OUTPATIENT
Start: 2020-01-01 | End: 2020-01-01 | Stop reason: HOSPADM

## 2020-01-01 RX ORDER — AMLODIPINE BESYLATE 5 MG/1
5 TABLET ORAL DAILY
Status: DISCONTINUED | OUTPATIENT
Start: 2020-01-01 | End: 2020-01-01 | Stop reason: HOSPADM

## 2020-01-01 RX ORDER — IBUPROFEN 400 MG/1
400 TABLET ORAL
Status: DISCONTINUED | OUTPATIENT
Start: 2020-01-01 | End: 2020-01-01 | Stop reason: HOSPADM

## 2020-01-01 RX ORDER — BRIMONIDINE TARTRATE 2 MG/ML
1 SOLUTION/ DROPS OPHTHALMIC EVERY 8 HOURS
Status: DISCONTINUED | OUTPATIENT
Start: 2020-01-01 | End: 2020-01-01 | Stop reason: HOSPADM

## 2020-01-01 RX ORDER — ONDANSETRON 2 MG/ML
4 INJECTION INTRAMUSCULAR; INTRAVENOUS
Status: COMPLETED | OUTPATIENT
Start: 2020-01-01 | End: 2020-01-01

## 2020-01-01 RX ORDER — ENALAPRIL MALEATE 2.5 MG/1
2.5 TABLET ORAL 2 TIMES DAILY
Qty: 60 TAB | Refills: 0 | Status: SHIPPED | OUTPATIENT
Start: 2020-01-01

## 2020-01-01 RX ORDER — PANTOPRAZOLE SODIUM 40 MG/1
40 TABLET, DELAYED RELEASE ORAL
Status: DISCONTINUED | OUTPATIENT
Start: 2020-01-01 | End: 2020-01-01 | Stop reason: HOSPADM

## 2020-01-01 RX ORDER — SODIUM CHLORIDE, SODIUM LACTATE, POTASSIUM CHLORIDE, CALCIUM CHLORIDE 600; 310; 30; 20 MG/100ML; MG/100ML; MG/100ML; MG/100ML
75 INJECTION, SOLUTION INTRAVENOUS CONTINUOUS
Status: DISPENSED | OUTPATIENT
Start: 2020-01-01 | End: 2020-01-01

## 2020-01-01 RX ORDER — LEVOTHYROXINE SODIUM 50 UG/1
50 TABLET ORAL
Status: DISCONTINUED | OUTPATIENT
Start: 2020-01-01 | End: 2020-01-01 | Stop reason: HOSPADM

## 2020-01-01 RX ORDER — MORPHINE SULFATE 2 MG/ML
1 INJECTION, SOLUTION INTRAMUSCULAR; INTRAVENOUS
Status: DISCONTINUED | OUTPATIENT
Start: 2020-01-01 | End: 2020-01-01

## 2020-01-01 RX ADMIN — ATORVASTATIN CALCIUM 20 MG: 20 TABLET, FILM COATED ORAL at 21:59

## 2020-01-01 RX ADMIN — Medication 1 TABLET: at 21:44

## 2020-01-01 RX ADMIN — DIVALPROEX SODIUM 125 MG: 125 CAPSULE ORAL at 16:31

## 2020-01-01 RX ADMIN — Medication 10 ML: at 13:10

## 2020-01-01 RX ADMIN — IOPAMIDOL 70 ML: 755 INJECTION, SOLUTION INTRAVENOUS at 17:09

## 2020-01-01 RX ADMIN — ASPIRIN 81 MG 81 MG: 81 TABLET ORAL at 09:41

## 2020-01-01 RX ADMIN — DORZOLAMIDE HYDROCHLORIDE AND TIMOLOL MALEATE 1 DROP: 20; 5 SOLUTION/ DROPS OPHTHALMIC at 18:00

## 2020-01-01 RX ADMIN — Medication 10 ML: at 06:00

## 2020-01-01 RX ADMIN — Medication 10 ML: at 21:59

## 2020-01-01 RX ADMIN — ASPIRIN 81 MG 81 MG: 81 TABLET ORAL at 08:38

## 2020-01-01 RX ADMIN — ASPIRIN 81 MG 81 MG: 81 TABLET ORAL at 09:34

## 2020-01-01 RX ADMIN — HYDROCODONE BITARTRATE AND ACETAMINOPHEN 1 TABLET: 5; 325 TABLET ORAL at 09:41

## 2020-01-01 RX ADMIN — Medication 1 TABLET: at 21:06

## 2020-01-01 RX ADMIN — Medication 10 ML: at 14:32

## 2020-01-01 RX ADMIN — Medication 1 TABLET: at 09:34

## 2020-01-01 RX ADMIN — DORZOLAMIDE HYDROCHLORIDE AND TIMOLOL MALEATE 1 DROP: 20; 5 SOLUTION/ DROPS OPHTHALMIC at 17:00

## 2020-01-01 RX ADMIN — FLUTICASONE PROPIONATE 2 SPRAY: 50 SPRAY, METERED NASAL at 08:39

## 2020-01-01 RX ADMIN — BRIMONIDINE TARTRATE 1 DROP: 2 SOLUTION OPHTHALMIC at 15:12

## 2020-01-01 RX ADMIN — ATORVASTATIN CALCIUM 20 MG: 20 TABLET, FILM COATED ORAL at 21:24

## 2020-01-01 RX ADMIN — DORZOLAMIDE HYDROCHLORIDE AND TIMOLOL MALEATE 1 DROP: 20; 5 SOLUTION/ DROPS OPHTHALMIC at 09:42

## 2020-01-01 RX ADMIN — ASPIRIN 81 MG 81 MG: 81 TABLET ORAL at 11:04

## 2020-01-01 RX ADMIN — AMLODIPINE BESYLATE 5 MG: 5 TABLET ORAL at 09:41

## 2020-01-01 RX ADMIN — FAMOTIDINE 20 MG: 20 TABLET, FILM COATED ORAL at 17:37

## 2020-01-01 RX ADMIN — HYDROMORPHONE HYDROCHLORIDE 0.5 MG: 1 INJECTION, SOLUTION INTRAMUSCULAR; INTRAVENOUS; SUBCUTANEOUS at 19:17

## 2020-01-01 RX ADMIN — DORZOLAMIDE HYDROCHLORIDE AND TIMOLOL MALEATE 1 DROP: 20; 5 SOLUTION/ DROPS OPHTHALMIC at 16:49

## 2020-01-01 RX ADMIN — BRIMONIDINE TARTRATE 1 DROP: 2 SOLUTION OPHTHALMIC at 21:57

## 2020-01-01 RX ADMIN — Medication 1 TABLET: at 09:41

## 2020-01-01 RX ADMIN — LEVOTHYROXINE SODIUM 50 MCG: 0.05 TABLET ORAL at 05:27

## 2020-01-01 RX ADMIN — ASPIRIN 81 MG 81 MG: 81 TABLET ORAL at 09:02

## 2020-01-01 RX ADMIN — FAMOTIDINE 20 MG: 20 TABLET, FILM COATED ORAL at 16:49

## 2020-01-01 RX ADMIN — MORPHINE SULFATE 2 MG: 2 INJECTION, SOLUTION INTRAMUSCULAR; INTRAVENOUS at 10:38

## 2020-01-01 RX ADMIN — FAMOTIDINE 20 MG: 20 TABLET, FILM COATED ORAL at 18:09

## 2020-01-01 RX ADMIN — ONDANSETRON 4 MG: 2 INJECTION INTRAMUSCULAR; INTRAVENOUS at 13:57

## 2020-01-01 RX ADMIN — PANTOPRAZOLE SODIUM 40 MG: 40 TABLET, DELAYED RELEASE ORAL at 05:47

## 2020-01-01 RX ADMIN — BRIMONIDINE TARTRATE 1 DROP: 2 SOLUTION OPHTHALMIC at 13:11

## 2020-01-01 RX ADMIN — HYDROCODONE BITARTRATE AND ACETAMINOPHEN 1 TABLET: 5; 325 TABLET ORAL at 19:01

## 2020-01-01 RX ADMIN — Medication 10 ML: at 15:12

## 2020-01-01 RX ADMIN — Medication 1 TABLET: at 11:04

## 2020-01-01 RX ADMIN — Medication 10 ML: at 21:03

## 2020-01-01 RX ADMIN — Medication 10 ML: at 05:31

## 2020-01-01 RX ADMIN — Medication 10 ML: at 21:42

## 2020-01-01 RX ADMIN — DORZOLAMIDE HYDROCHLORIDE AND TIMOLOL MALEATE 1 DROP: 20; 5 SOLUTION/ DROPS OPHTHALMIC at 08:39

## 2020-01-01 RX ADMIN — BRIMONIDINE TARTRATE 1 DROP: 2 SOLUTION OPHTHALMIC at 05:30

## 2020-01-01 RX ADMIN — HYDROMORPHONE HYDROCHLORIDE 1 MG: 1 INJECTION, SOLUTION INTRAMUSCULAR; INTRAVENOUS; SUBCUTANEOUS at 11:49

## 2020-01-01 RX ADMIN — DORZOLAMIDE HYDROCHLORIDE AND TIMOLOL MALEATE 1 DROP: 20; 5 SOLUTION/ DROPS OPHTHALMIC at 17:28

## 2020-01-01 RX ADMIN — HYDROMORPHONE HYDROCHLORIDE 0.5 MG: 1 INJECTION, SOLUTION INTRAMUSCULAR; INTRAVENOUS; SUBCUTANEOUS at 17:22

## 2020-01-01 RX ADMIN — HALOPERIDOL LACTATE 5 MG: 5 INJECTION INTRAMUSCULAR at 14:32

## 2020-01-01 RX ADMIN — LEVOTHYROXINE SODIUM 50 MCG: 0.05 TABLET ORAL at 05:50

## 2020-01-01 RX ADMIN — Medication 10 ML: at 22:00

## 2020-01-01 RX ADMIN — AMLODIPINE BESYLATE 5 MG: 5 TABLET ORAL at 11:04

## 2020-01-01 RX ADMIN — ATORVASTATIN CALCIUM 20 MG: 20 TABLET, FILM COATED ORAL at 21:06

## 2020-01-01 RX ADMIN — TUBERCULIN PURIFIED PROTEIN DERIVATIVE 5 UNITS: 5 INJECTION, SOLUTION INTRADERMAL at 19:41

## 2020-01-01 RX ADMIN — BRIMONIDINE TARTRATE 1 DROP: 2 SOLUTION OPHTHALMIC at 14:31

## 2020-01-01 RX ADMIN — MORPHINE SULFATE 2 MG: 2 INJECTION, SOLUTION INTRAMUSCULAR; INTRAVENOUS at 13:57

## 2020-01-01 RX ADMIN — FLUTICASONE PROPIONATE 2 SPRAY: 50 SPRAY, METERED NASAL at 10:00

## 2020-01-01 RX ADMIN — BRIMONIDINE TARTRATE 1 DROP: 2 SOLUTION OPHTHALMIC at 15:20

## 2020-01-01 RX ADMIN — DIVALPROEX SODIUM 125 MG: 125 CAPSULE ORAL at 21:06

## 2020-01-01 RX ADMIN — BRIMONIDINE TARTRATE 1 DROP: 2 SOLUTION OPHTHALMIC at 18:10

## 2020-01-01 RX ADMIN — LEVOTHYROXINE SODIUM 50 MCG: 0.05 TABLET ORAL at 05:29

## 2020-01-01 RX ADMIN — HYDROCODONE BITARTRATE AND ACETAMINOPHEN 1 TABLET: 5; 325 TABLET ORAL at 05:23

## 2020-01-01 RX ADMIN — DORZOLAMIDE HYDROCHLORIDE AND TIMOLOL MALEATE 1 DROP: 20; 5 SOLUTION/ DROPS OPHTHALMIC at 10:12

## 2020-01-01 RX ADMIN — BRIMONIDINE TARTRATE 1 DROP: 2 SOLUTION OPHTHALMIC at 05:06

## 2020-01-01 RX ADMIN — Medication 10 ML: at 05:23

## 2020-01-01 RX ADMIN — HALOPERIDOL LACTATE 3 MG: 5 INJECTION, SOLUTION INTRAMUSCULAR at 08:39

## 2020-01-01 RX ADMIN — HYDROMORPHONE HYDROCHLORIDE 0.5 MG: 1 INJECTION, SOLUTION INTRAMUSCULAR; INTRAVENOUS; SUBCUTANEOUS at 10:38

## 2020-01-01 RX ADMIN — BRIMONIDINE TARTRATE 1 DROP: 2 SOLUTION OPHTHALMIC at 06:00

## 2020-01-01 RX ADMIN — AMLODIPINE BESYLATE 5 MG: 5 TABLET ORAL at 10:10

## 2020-01-01 RX ADMIN — Medication 10 ML: at 21:45

## 2020-01-01 RX ADMIN — BRIMONIDINE TARTRATE 1 DROP: 2 SOLUTION OPHTHALMIC at 05:26

## 2020-01-01 RX ADMIN — BRIMONIDINE TARTRATE 1 DROP: 2 SOLUTION OPHTHALMIC at 15:00

## 2020-01-01 RX ADMIN — Medication 1 TABLET: at 21:42

## 2020-01-01 RX ADMIN — Medication 1 TABLET: at 21:59

## 2020-01-01 RX ADMIN — Medication 10 ML: at 21:57

## 2020-01-01 RX ADMIN — BRIMONIDINE TARTRATE 1 DROP: 2 SOLUTION OPHTHALMIC at 22:00

## 2020-01-01 RX ADMIN — LEVOTHYROXINE SODIUM 50 MCG: 0.05 TABLET ORAL at 05:47

## 2020-01-01 RX ADMIN — Medication 10 ML: at 05:51

## 2020-01-01 RX ADMIN — SODIUM CHLORIDE, SODIUM LACTATE, POTASSIUM CHLORIDE, AND CALCIUM CHLORIDE 75 ML/HR: 600; 310; 30; 20 INJECTION, SOLUTION INTRAVENOUS at 05:00

## 2020-01-01 RX ADMIN — BRIMONIDINE TARTRATE 1 DROP: 2 SOLUTION OPHTHALMIC at 05:28

## 2020-01-01 RX ADMIN — Medication 1 TABLET: at 10:10

## 2020-01-01 RX ADMIN — HYDROMORPHONE HYDROCHLORIDE 0.5 MG: 1 INJECTION, SOLUTION INTRAMUSCULAR; INTRAVENOUS; SUBCUTANEOUS at 04:48

## 2020-01-01 RX ADMIN — DEXTROSE MONOHYDRATE 86.5 ML/HR: 5 INJECTION, SOLUTION INTRAVENOUS at 10:16

## 2020-01-01 RX ADMIN — Medication 10 ML: at 05:28

## 2020-01-01 RX ADMIN — DORZOLAMIDE HYDROCHLORIDE AND TIMOLOL MALEATE 1 DROP: 20; 5 SOLUTION/ DROPS OPHTHALMIC at 09:43

## 2020-01-01 RX ADMIN — FLUTICASONE PROPIONATE 2 SPRAY: 50 SPRAY, METERED NASAL at 09:42

## 2020-01-01 RX ADMIN — Medication 1 TABLET: at 08:40

## 2020-01-01 RX ADMIN — FLUTICASONE PROPIONATE 2 SPRAY: 50 SPRAY, METERED NASAL at 10:12

## 2020-01-01 RX ADMIN — DIVALPROEX SODIUM 125 MG: 125 CAPSULE ORAL at 21:44

## 2020-01-01 RX ADMIN — HYDROMORPHONE HYDROCHLORIDE 0.5 MG: 1 INJECTION, SOLUTION INTRAMUSCULAR; INTRAVENOUS; SUBCUTANEOUS at 02:20

## 2020-01-01 RX ADMIN — BRIMONIDINE TARTRATE 1 DROP: 2 SOLUTION OPHTHALMIC at 21:45

## 2020-01-01 RX ADMIN — DORZOLAMIDE HYDROCHLORIDE AND TIMOLOL MALEATE 1 DROP: 20; 5 SOLUTION/ DROPS OPHTHALMIC at 10:19

## 2020-01-01 RX ADMIN — HALOPERIDOL LACTATE 1 MG: 5 INJECTION INTRAMUSCULAR at 12:20

## 2020-01-01 RX ADMIN — ATORVASTATIN CALCIUM 20 MG: 20 TABLET, FILM COATED ORAL at 21:44

## 2020-01-01 RX ADMIN — METOPROLOL TARTRATE 5 MG: 5 INJECTION INTRAVENOUS at 00:25

## 2020-01-01 RX ADMIN — BRIMONIDINE TARTRATE 1 DROP: 2 SOLUTION OPHTHALMIC at 22:24

## 2020-01-01 RX ADMIN — HYDROCODONE BITARTRATE AND ACETAMINOPHEN 1 TABLET: 5; 325 TABLET ORAL at 01:12

## 2020-01-01 RX ADMIN — HYDROCODONE BITARTRATE AND ACETAMINOPHEN 1 TABLET: 5; 325 TABLET ORAL at 16:33

## 2020-01-01 RX ADMIN — HYDROMORPHONE HYDROCHLORIDE 0.5 MG: 1 INJECTION, SOLUTION INTRAMUSCULAR; INTRAVENOUS; SUBCUTANEOUS at 14:24

## 2020-01-01 RX ADMIN — ATORVASTATIN CALCIUM 20 MG: 20 TABLET, FILM COATED ORAL at 22:23

## 2020-01-01 RX ADMIN — ACETAMINOPHEN 650 MG: 325 TABLET, FILM COATED ORAL at 18:09

## 2020-01-01 RX ADMIN — DORZOLAMIDE HYDROCHLORIDE AND TIMOLOL MALEATE 1 DROP: 20; 5 SOLUTION/ DROPS OPHTHALMIC at 18:11

## 2020-01-01 RX ADMIN — DORZOLAMIDE HYDROCHLORIDE AND TIMOLOL MALEATE 1 DROP: 20; 5 SOLUTION/ DROPS OPHTHALMIC at 09:45

## 2020-01-01 RX ADMIN — PANTOPRAZOLE SODIUM 40 MG: 40 TABLET, DELAYED RELEASE ORAL at 05:26

## 2020-01-01 RX ADMIN — SODIUM CHLORIDE 100 ML: 900 INJECTION, SOLUTION INTRAVENOUS at 17:09

## 2020-01-01 RX ADMIN — ATORVASTATIN CALCIUM 20 MG: 20 TABLET, FILM COATED ORAL at 21:42

## 2020-01-01 RX ADMIN — FLUTICASONE PROPIONATE 2 SPRAY: 50 SPRAY, METERED NASAL at 09:43

## 2020-01-01 RX ADMIN — BRIMONIDINE TARTRATE 1 DROP: 2 SOLUTION OPHTHALMIC at 21:25

## 2020-01-01 RX ADMIN — Medication 1 TABLET: at 20:24

## 2020-01-01 RX ADMIN — Medication 10 ML: at 14:30

## 2020-01-01 RX ADMIN — FLUTICASONE PROPIONATE 2 SPRAY: 50 SPRAY, METERED NASAL at 10:19

## 2020-01-01 RX ADMIN — BRIMONIDINE TARTRATE 1 DROP: 2 SOLUTION OPHTHALMIC at 13:57

## 2020-01-01 RX ADMIN — DORZOLAMIDE HYDROCHLORIDE AND TIMOLOL MALEATE 1 DROP: 20; 5 SOLUTION/ DROPS OPHTHALMIC at 17:37

## 2020-01-01 RX ADMIN — Medication 10 ML: at 22:25

## 2020-01-01 RX ADMIN — MORPHINE SULFATE 1 MG: 2 INJECTION, SOLUTION INTRAMUSCULAR; INTRAVENOUS at 19:40

## 2020-01-01 RX ADMIN — BRIMONIDINE TARTRATE 1 DROP: 2 SOLUTION OPHTHALMIC at 21:59

## 2020-01-01 RX ADMIN — BRIMONIDINE TARTRATE 1 DROP: 2 SOLUTION OPHTHALMIC at 05:51

## 2020-01-01 RX ADMIN — DORZOLAMIDE HYDROCHLORIDE AND TIMOLOL MALEATE 1 DROP: 20; 5 SOLUTION/ DROPS OPHTHALMIC at 17:33

## 2020-01-01 RX ADMIN — SODIUM CHLORIDE, SODIUM LACTATE, POTASSIUM CHLORIDE, AND CALCIUM CHLORIDE 75 ML/HR: 600; 310; 30; 20 INJECTION, SOLUTION INTRAVENOUS at 10:42

## 2020-01-01 RX ADMIN — Medication 1 TABLET: at 22:23

## 2020-01-01 RX ADMIN — Medication 1 TABLET: at 14:33

## 2020-01-01 RX ADMIN — Medication 10 ML: at 05:05

## 2020-01-01 RX ADMIN — DORZOLAMIDE HYDROCHLORIDE AND TIMOLOL MALEATE 1 DROP: 20; 5 SOLUTION/ DROPS OPHTHALMIC at 09:01

## 2020-01-01 RX ADMIN — Medication 1 TABLET: at 09:02

## 2020-01-01 RX ADMIN — HYDROMORPHONE HYDROCHLORIDE 0.5 MG: 1 INJECTION, SOLUTION INTRAMUSCULAR; INTRAVENOUS; SUBCUTANEOUS at 08:49

## 2020-01-01 RX ADMIN — BRIMONIDINE TARTRATE 1 DROP: 2 SOLUTION OPHTHALMIC at 21:43

## 2020-01-01 RX ADMIN — ASPIRIN 81 MG 81 MG: 81 TABLET ORAL at 14:33

## 2020-01-01 RX ADMIN — ASPIRIN 81 MG 81 MG: 81 TABLET ORAL at 10:10

## 2020-01-01 RX ADMIN — BRIMONIDINE TARTRATE 1 DROP: 2 SOLUTION OPHTHALMIC at 05:00

## 2020-01-01 RX ADMIN — DORZOLAMIDE HYDROCHLORIDE AND TIMOLOL MALEATE 1 DROP: 20; 5 SOLUTION/ DROPS OPHTHALMIC at 19:40

## 2020-01-01 RX ADMIN — Medication 10 ML: at 13:57

## 2020-01-01 RX ADMIN — PANTOPRAZOLE SODIUM 40 MG: 40 TABLET, DELAYED RELEASE ORAL at 05:29

## 2020-01-01 RX ADMIN — Medication 10 ML: at 14:33

## 2020-01-01 RX ADMIN — Medication 10 ML: at 14:00

## 2020-01-01 RX ADMIN — HYDROMORPHONE HYDROCHLORIDE 0.5 MG: 1 INJECTION, SOLUTION INTRAMUSCULAR; INTRAVENOUS; SUBCUTANEOUS at 21:59

## 2020-01-01 RX ADMIN — HYDROMORPHONE HYDROCHLORIDE 0.5 MG: 1 INJECTION, SOLUTION INTRAMUSCULAR; INTRAVENOUS; SUBCUTANEOUS at 23:04

## 2020-01-01 RX ADMIN — SODIUM CHLORIDE, SODIUM LACTATE, POTASSIUM CHLORIDE, AND CALCIUM CHLORIDE 100 ML/HR: 600; 310; 30; 20 INJECTION, SOLUTION INTRAVENOUS at 04:22

## 2020-01-01 RX ADMIN — AMLODIPINE BESYLATE 5 MG: 5 TABLET ORAL at 08:38

## 2020-01-01 RX ADMIN — SODIUM CHLORIDE, SODIUM LACTATE, POTASSIUM CHLORIDE, AND CALCIUM CHLORIDE 75 ML/HR: 600; 310; 30; 20 INJECTION, SOLUTION INTRAVENOUS at 17:15

## 2020-01-01 RX ADMIN — Medication 10 ML: at 17:09

## 2020-01-01 RX ADMIN — FLUTICASONE PROPIONATE 2 SPRAY: 50 SPRAY, METERED NASAL at 09:00

## 2020-01-01 RX ADMIN — DIVALPROEX SODIUM 125 MG: 125 CAPSULE ORAL at 08:38

## 2020-01-01 RX ADMIN — HYDROCODONE BITARTRATE AND ACETAMINOPHEN 1 TABLET: 5; 325 TABLET ORAL at 12:27

## 2020-01-01 RX ADMIN — PANTOPRAZOLE SODIUM 40 MG: 40 TABLET, DELAYED RELEASE ORAL at 05:50

## 2020-05-01 PROBLEM — R53.81 PHYSICAL DECONDITIONING: Status: ACTIVE | Noted: 2020-01-01

## 2020-05-01 PROBLEM — Z91.89 AT RISK FOR UNSAFE BEHAVIOR: Status: ACTIVE | Noted: 2020-01-01

## 2020-05-01 PROBLEM — R53.81 DEBILITY: Status: ACTIVE | Noted: 2020-01-01

## 2020-05-01 PROBLEM — S22.32XA FRACTURE OF ONE RIB, LEFT SIDE, INITIAL ENCOUNTER FOR CLOSED FRACTURE: Status: ACTIVE | Noted: 2020-01-01

## 2020-05-01 PROBLEM — R29.6 RECURRENT FALLS: Status: ACTIVE | Noted: 2020-01-01

## 2020-05-01 PROBLEM — S42.102A CLOSED LEFT SCAPULAR FRACTURE: Status: ACTIVE | Noted: 2020-01-01

## 2020-05-01 NOTE — H&P
HOSPITALIST H&P/CONSULT 
NAME:  Colin Toledo Age:  80 y.o. 
:   1923 MRN:   [id number] PCP: Natasha Infante MD 
Consulting MD: Treatment Team: Attending Provider: Gabriel Ochoa MD; Primary Nurse: Tk Light RN 
HPI:  
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 Pt lives by herself, she was found down by her neighbor today around 1300 hrs. As per pt, she fell around 1000 hrs this AM. Pt reports recurrent falls, lives by herself. She reports of multiple bruises all over her body, and she reportedly hit her head today. She c/o mild to moderate pain diffusely throughout her body joints. She denies fever, nausea/vomiting, chest pain, headache, weakness/numbness/tingling in extremities, diarrhea, urinary symptoms. ER physician attempted to make pt walk, but she is not able to stand up, needed moderate 2 ppl assistance and was noted to be unsteady. ER work up showed an unremarkable CT head, suspected left scapular tip fracture with left 6th rib fracture. Given pt is at high risk of falls and is unsafe being alone at home, and pain management, pt will admitted for PT/OT eval and placement. Complete ROS done and is as stated in HPI or otherwise negative Past Medical History:  
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  
 Skin cancer of scalp 2012  TIA (transient ischemic attack)  Unspecified hypothyroidism 10/31/2012 Past Surgical History:  
Procedure Laterality Date  HX APPENDECTOMY  HX COLONOSCOPY    
 HX MALIGNANT SKIN LESION EXCISION    
 scalp  RECTAL SENSATION TEST, BALLOON Prior to Admission Medications Prescriptions Last Dose Informant Patient Reported? Taking? Cholecalciferol, Vitamin D3, (VITAMIN D3) 1,000 unit cap   Yes No  
Sig: Take 1 Tab by mouth daily. TRAVATAN Z 0.004 % ophthalmic solution   Yes No  
Sig: Administer 1 Drop to right eye nightly. acetaminophen (TYLENOL) 500 mg tablet   Yes No  
Sig: Take 650 mg by mouth every six (6) hours as needed for Pain. aspirin 81 mg chewable tablet   Yes No  
Sig: Take 81 mg by mouth daily. brimonidine (ALPHAGAN P) 0.1 % ophthalmic solution   Yes No  
Sig: Administer 1 Drop to right eye every eight (8) hours. brimonidine (ALPHAGAN) 0.15 % ophthalmic solution   Yes No  
Sig: Administer 1 Drop to right eye three (3) times daily. calcium-cholecalciferol, d3, (CALCIUM 600 + D) 600-125 mg-unit Tab   Yes No  
Sig: Take 1 Tab by mouth two (2) times a day. clobetasol (TEMOVATE) 0.05 % external solution   Yes No  
Sig: Apply 1 Drop to affected area two (2) times a day. apply to left upper arm twice a day  
dorzolamide-timolol (COSOPT) 2-0.5 % ophthalmic solution   Yes No  
Sig: Administer 1 Drop to both eyes two (2) times a day. enalapril (VASOTEC) 10 mg tablet   No No  
Sig: Take 1 Tab by mouth two (2) times a day. esomeprazole (NEXIUM) 40 mg capsule   Yes No  
Sig: Take 40 mg by mouth daily. fluticasone propionate (FLONASE) 50 mcg/actuation nasal spray   No No  
Sig: USE 2 SPRAYS IN EACH NOSTRIL ONCE DAILY  
ketoconazole (NIZORAL) 2 % shampoo   Yes No  
Sig: Apply 1 mL to affected area daily. apply to hair and let sit for 3 min and rinse out levothyroxine (SYNTHROID) 50 mcg tablet   No No  
Sig: TAKE ONE (1) TABLET BY MOUTH DAILY. lidocaine (LIDODERM) 5 %   No No  
Si Patch by TransDERmal route every twenty-four (24) hours. Apply patch to the affected area for 12 hours a day and remove for 12 hours a day. lubiPROStone (AMITIZA) 8 mcg capsule   No No  
Sig: Take 1 Cap by mouth daily (with breakfast). polyethylene glycol (MIRALAX) 17 gram packet   Yes No  
Sig: Take 17 g by mouth daily. mixed with water or juice  
simethicone 125 mg chewable tablet   No No  
Sig: Take 125 mg by mouth every six (6) hours as needed for Flatulence. simvastatin (ZOCOR) 40 mg tablet   No No  
Sig: TAKE <1> TABLET BY MOUTH NIGHTLY Patient taking differently: TAKE 1 TABLET BY MOUTH NIGHTLY  
traMADol (ULTRAM) 50 mg tablet   No No  
Sig: Take 1 Tab by mouth every eight (8) hours as needed for Pain. Max Daily Amount: 150 mg.  
vits A,C,E/lutein/minerals (VISION FORMULA, WITH LUTEIN, PO)   Yes No  
Sig: Take 1 Tab by mouth daily. Facility-Administered Medications: None No Known Allergies Social History Tobacco Use  Smoking status: Never Smoker  Smokeless tobacco: Never Used Substance Use Topics  Alcohol use: No  
  
Family History Problem Relation Age of Onset  Hypertension Mother  Heart Disease Father  Breast Cancer Neg Hx Objective:  
 
Visit Vitals /78 Pulse 72 Temp 97.5 °F (36.4 °C) Resp 24 Ht 5' 7\" (1.702 m) Wt 55.8 kg (123 lb) SpO2 97% BMI 19.26 kg/m² Temp (24hrs), Av.5 °F (36.4 °C), Min:97.5 °F (36.4 °C), Max:97.5 °F (36.4 °C) Oxygen Therapy O2 Sat (%): 97 % (20 1442) Pulse via Oximetry: 72 beats per minute (20 1442) O2 Device: Nasal cannula (20 1257) O2 Flow Rate (L/min): 2 l/min (20 1257) Physical Exam: 
General:    Frail, cachectic, elderly, hard of hearing, mild distress Head:   Normocephalic, without obvious abnormality, atraumatic. Nose:  Nares normal. No drainage or sinus tenderness. Lungs:   Clear to auscultation bilaterally. No Wheezing or Rhonchi. No rales. Heart:   Regular rate and rhythm,  no murmur, rub or gallop. Abdomen:   Soft, non-tender. Not distended. Bowel sounds normal.  
Extremities: Multiple superficial bruises on bilateral upper and lower extremities with superficial skin abrasion on left wrist and bilateral knees Skin:     Texture, turgor normal. No rashes or lesions. Not Jaundiced Neurologic: gcs 15, moves all 4 extremities spontaneously Psych:             AOx3, mood and affect flat Data Review:  
Recent Results (from the past 24 hour(s)) MYOGLOBIN Collection Time: 05/01/20  1:40 PM  
Result Value Ref Range Myoglobin 860 (H) 9 - 82 ng/mL CK Collection Time: 05/01/20  1:40 PM  
Result Value Ref Range  21 - 215 U/L  
CBC WITH AUTOMATED DIFF Collection Time: 05/01/20  2:03 PM  
Result Value Ref Range WBC 7.9 4.3 - 11.1 K/uL  
 RBC 2.52 (L) 4.05 - 5.2 M/uL HGB 9.2 (L) 11.7 - 15.4 g/dL HCT 27.4 (L) 35.8 - 46.3 % .7 (H) 79.6 - 97.8 FL  
 MCH 36.5 (H) 26.1 - 32.9 PG  
 MCHC 33.6 31.4 - 35.0 g/dL  
 RDW 14.3 11.9 - 14.6 % PLATELET 377 (L) 083 - 450 K/uL MPV 12.2 9.4 - 12.3 FL ABSOLUTE NRBC 0.00 0.0 - 0.2 K/uL  
 DF AUTOMATED NEUTROPHILS 84 (H) 43 - 78 % LYMPHOCYTES 6 (L) 13 - 44 % MONOCYTES 8 4.0 - 12.0 % EOSINOPHILS 0 (L) 0.5 - 7.8 % BASOPHILS 0 0.0 - 2.0 % IMMATURE GRANULOCYTES 1 0.0 - 5.0 %  
 ABS. NEUTROPHILS 6.6 1.7 - 8.2 K/UL  
 ABS. LYMPHOCYTES 0.5 0.5 - 4.6 K/UL  
 ABS. MONOCYTES 0.7 0.1 - 1.3 K/UL  
 ABS. EOSINOPHILS 0.0 0.0 - 0.8 K/UL  
 ABS. BASOPHILS 0.0 0.0 - 0.2 K/UL  
 ABS. IMM. GRANS. 0.1 0.0 - 0.5 K/UL METABOLIC PANEL, COMPREHENSIVE Collection Time: 05/01/20  4:15 PM  
Result Value Ref Range Sodium 138 136 - 145 mmol/L Potassium 4.4 3.5 - 5.1 mmol/L  Chloride 104 98 - 107 mmol/L  
 CO2 27 21 - 32 mmol/L  
 Anion gap 7 7 - 16 mmol/L Glucose 135 (H) 65 - 100 mg/dL BUN 25 (H) 8 - 23 MG/DL Creatinine 0.86 0.6 - 1.0 MG/DL  
 GFR est AA >60 >60 ml/min/1.73m2 GFR est non-AA >60 >60 ml/min/1.73m2 Calcium 8.8 8.3 - 10.4 MG/DL Bilirubin, total 0.8 0.2 - 1.1 MG/DL  
 ALT (SGPT) 20 12 - 65 U/L  
 AST (SGOT) 21 15 - 37 U/L Alk. phosphatase 73 50 - 136 U/L Protein, total 6.7 6.3 - 8.2 g/dL Albumin 3.4 3.2 - 4.6 g/dL Globulin 3.3 2.3 - 3.5 g/dL A-G Ratio 1.0 (L) 1.2 - 3.5 Imaging Billy Khariarnold Duff Clinical History: The patient is a 80years year old Female presenting with 
symptoms of L shoulder pain after fall. 
  
Comparison:  Left humerus films 5/1/2020 
  
Findings: 
  
3 views of the left shoulder were obtained.  
  
There is slight deformity of the left posterior lateral sixth rib. In addition 
there is suggestion of a fracture of the scapular tip. Diffuse osteopenia 
otherwise limits evaluation. 
  
Joint spaces are well-maintained. 
  
The visualized lung field is unremarkable. Calcification is seen in the left 
lateral chest wall/breast soft tissues 
  
IMPRESSION Impression: 
  
  
1. Suspected scapular tip fracture with questionable additional injury involving 
the left posterior lateral sixth rib. Further evaluation with a chest CT should 
be considered. 
  
 
Clinical History: The patient is a 80years year old Female presenting with 
symptoms of L humerus pain after fall. 
  
Comparison:  none 
  
Findings: 
  
2 views of the left humerus were obtained.  
  
No fracture or dislocation is identified. There is diffuse osteopenia. 
  
Degenerative changes are suggested throughout the elbow. 
  
IMPRESSION Impression: 
  
No acute osseous or joint abnormalities. EXAM: CT HEAD WITHOUT CONTRAST 
  
INDICATION: Found down.  Head trauma. 
  
COMPARISON: Head CT 12/14/2018 
  
TECHNIQUE: Contiguous axial images were obtained from the skull base through the 
 vertex without IV contrast. Radiation dose reduction techniques were used for 
this study. Our CT scanners use one or all of the following: Automated exposure 
control, adjustment of the mA and/or kV according to patient size, iterative 
reconstruction. 
  
FINDINGS:  
Global parenchymal volume loss and ex vacuo ventriculomegaly. Periventricular 
white matter hypoattenuation reflects sequelae of small vessel ischemic disease. No acute infarction or hemorrhage is evident. No hydrocephalus or midline shift. No extra-axial mass or hemorrhage. The basal cisterns are patent.  
  
The visualized portions of the orbits are normal. The mastoid air cells and 
paranasal sinuses are patent. 
  
The visualized vascular structures have an unremarkable noncontrast appearance. 
  
The calvarium and soft tissues appear normal. 
  
IMPRESSION IMPRESSION:  
No acute intracranial abnormality. Assessment and Plan: Active Hospital Problems Diagnosis Date Noted  Recurrent falls 05/01/2020 Priority: 1 - One  
 Fracture of one rib, left side, initial encounter for closed fracture 05/01/2020 Priority: 2 - Two  Closed left scapular fracture 05/01/2020 Priority: 3 - Three  At risk for unsafe behavior 05/01/2020 Priority: 4 - Four  Debility 05/01/2020 Priority: 5 - Five  Physical deconditioning 05/01/2020  Wound of right leg 01/22/2019  Macular degeneration 01/03/2013 Dr. Raquel Prince  Hearing loss, sensorineural 01/03/2013  CN VI palsy 01/03/2013 Spring 2012- Dr. Raquel Prince  Glaucoma 01/03/2013 Dr. Raquel Prince  Hypothyroidism 10/31/2012  Hyperlipidemia 10/31/2012  Hypertension 10/31/2012  Senile osteoporosis 10/31/2012 Dr. Esequiel Noel PLAN 
·  Admit to inpatient for debility, recurrent falls, left scapular and 6th rib fracture and pain management · Pain control with tramadol and norco prn 
· PT/OT eval 
· PPD ordered · BP optimally controlled, continue to monitor · Resume synthroid · Continue other home medications including eye drops · IV fluids for hydration · DVT prophylaxis with scd · Gi prophylaxis with PPI Code Status: full High risk Anticipated discharge: >2MN Signed By: Kendra Lorenz MD   
 May 1, 2020

## 2020-05-01 NOTE — PROGRESS NOTES
Pt continues to moan, cry and guard in room. MD made aware of continued pain. Pt unable to hear or see to answer complex admission questions. Pt frequently responding to what she thinks she is hearing, not the appropriate question. Will continue to monitor for s/sx of confusion.

## 2020-05-01 NOTE — ED PROVIDER NOTES
Patient has a history of hypertension, hyperlipidemia and TIA. She states she lives at home alone. She says she slipped on something in her kitchen and fell onto her kitchen floor. While she was falling, she hit her head on the cabinets. She denies any loss of consciousness, is now complaining only of pain to her left shoulder and upper arm. She was unable to get up off the floor. A neighbor found her lying on the floor and called EMS. She states her arm was hurting her too badly to get up off the floor. Past Medical History:  
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  
 Skin cancer of scalp 2012  TIA (transient ischemic attack)  Unspecified hypothyroidism 10/31/2012 Past Surgical History:  
Procedure Laterality Date  HX APPENDECTOMY  HX COLONOSCOPY    
 HX MALIGNANT SKIN LESION EXCISION    
 scalp  RECTAL SENSATION TEST, BALLOON Family History:  
Problem Relation Age of Onset  Hypertension Mother  Heart Disease Father  Breast Cancer Neg Hx Social History Socioeconomic History  Marital status: SINGLE Spouse name: Not on file  Number of children: Not on file  Years of education: Not on file  Highest education level: Not on file Occupational History  Not on file Social Needs  Financial resource strain: Not on file  Food insecurity Worry: Not on file Inability: Not on file  Transportation needs Medical: Not on file Non-medical: Not on file Tobacco Use  Smoking status: Never Smoker  Smokeless tobacco: Never Used Substance and Sexual Activity  Alcohol use: No  
 Drug use: No  
 Sexual activity: Not on file Lifestyle  Physical activity Days per week: Not on file Minutes per session: Not on file  Stress: Not on file Relationships  Social connections Talks on phone: Not on file Gets together: Not on file Attends [employer]ism service: Not on file Active member of club or organization: Not on file Attends meetings of clubs or organizations: Not on file Relationship status: Not on file  Intimate partner violence Fear of current or ex partner: Not on file Emotionally abused: Not on file Physically abused: Not on file Forced sexual activity: Not on file Other Topics Concern [address] Service Not Asked  Blood Transfusions Not Asked  Caffeine Concern Not Asked  Occupational Exposure Not Asked [place] Hazards Not Asked  Sleep Concern Not Asked  Stress Concern Not Asked  Weight Concern Not Asked  Special Diet Not Asked  Back Care Not Asked  Exercise Not Asked  Bike Helmet Not Asked  Seat Belt Not Asked  Self-Exams Not Asked Social History Narrative  Not on file ALLERGIES: Patient has no known allergies. Review of Systems Constitutional: Negative for chills and fever. Gastrointestinal: Negative for nausea and vomiting. All other systems reviewed and are negative. Vitals:  
 05/01/20 1242 05/01/20 1244 05/01/20 1257 05/01/20 1300 BP: 139/80 139/80 Pulse: 76   74 Resp: 24 Temp: 97.5 °F (36.4 °C) SpO2: 94%  98% 99% Weight: 55.8 kg (123 lb) Height: 5' 7\" (1.702 m) Physical Exam 
Vitals signs and nursing note reviewed. Constitutional: Appearance: Normal appearance. She is well-developed and normal weight. HENT:  
   Head: Normocephalic and atraumatic. Eyes:  
   Conjunctiva/sclera: Conjunctivae normal.  
   Pupils: Pupils are equal, round, and reactive to light. Neck: Musculoskeletal: Normal range of motion and neck supple. Pulmonary:  
   Effort: Pulmonary effort is normal. No respiratory distress. Musculoskeletal:     
   General: Tenderness present. No deformity. Comments: Left upper arm discomfort with passive range of motion at her left shoulder. She is neurovascular intact distally in her left hand with normal pulses and sensation. Skin: 
   General: Skin is warm and dry. Neurological:  
   Mental Status: She is alert and oriented to person, place, and time. Psychiatric:     
   Behavior: Behavior normal.  
 
  
 
MDM Number of Diagnoses or Management Options Closed fracture of left scapula, unspecified part of scapula, initial encounter: new and requires workup Closed fracture of one rib of left side, initial encounter: new and requires workup Fall, initial encounter: new and requires workup Hyperlipidemia, unspecified hyperlipidemia type: Hypertension, unspecified type:  
Diagnosis management comments: 4:09 PM discussed results with patient. I spoke with Dr. Sheldon Luz, discussed details of case. He requests that we try to ambulate the patient and get the  to see her here in the ER. Amount and/or Complexity of Data Reviewed Clinical lab tests: ordered and reviewed Tests in the radiology section of CPT®: ordered and reviewed Discuss the patient with other providers: yes Risk of Complications, Morbidity, and/or Mortality Presenting problems: moderate Diagnostic procedures: moderate Management options: moderate Patient Progress Patient progress: stable Procedures

## 2020-05-01 NOTE — ED TRIAGE NOTES
Pt arrived via EMS from home. Pt neighbor found Pt on floor unknown of how long Pt was there. Pt is on Aspirin 81mg. Pt has skin tare on Rarm and Rknee and Lhand. Pt states she hit her head and complaint of L shoulder pain. EMS vitlas /70 HR 80 O2 96% 2L NC,  Temp 97.8 Pt neighbors Name is Dwaine Jett and  Phone number is [phone].

## 2020-05-01 NOTE — ED NOTES
TRANSFER - OUT REPORT: 
 
Verbal report given to [facility] IMMANUEL YORK RN(name) on Belinda Sierra  being transferred to [facility](unit) for routine progression of care Report consisted of patients Situation, Background, Assessment and  
Recommendations(SBAR). Information from the following report(s) ED Summary was reviewed with the receiving nurse. Lines:  
Peripheral IV 05/01/20 Left Antecubital (Active) Opportunity for questions and clarification was provided. Patient transported with: 
 [employer]

## 2020-05-02 NOTE — PROGRESS NOTES
Complete grooming/oral care with standby assistance. Complete upper body bathing and dressing with standby assistance. Complete 20-25 minute therapeutic activity/ADL with 3 or less rest breaks. Complete toileting with minimal assistance. Time frame: 4- 7 visits. OCCUPATIONAL THERAPY: Initial Assessment and Daily Note 5/2/2020 INPATIENT: OT Visit Days: 1 Payor: Marleen Fuentes MMP / Plan: [place] [employer] MMP / Product Type: Managed Care Medicare /  
  
NAME/AGE/GENDER: Aaron Clark is a 80 y.o. female PRIMARY DIAGNOSIS:  Recurrent falls [R29.6] Macular degeneration [H35.30] At risk for unsafe behavior [Z91.89] Fracture of one rib, left side, initial encounter for closed fracture [S22.32XA] Closed left scapular fracture [S42.102A] Debility [R53.81] Physical deconditioning [R53.81] Recurrent falls Recurrent falls ICD-10: Treatment Diagnosis:  
 Generalized Muscle Weakness (M62.81) History of falling (Z91.81) Precautions/Allergies: 
   Patient has no known allergies. ASSESSMENT:  
 
Ms. Beto Michele presents with a history of the above and is moaning in bed. She states she received rehab once after she fell another time. Ms. Beto Michele completed bed mobility with moderate to maximal assistance and stood with moderate to maximal assistance of 2 people. She currently requires maximal overall assistance for ADL. Total for instrumental ADL such as homemaking, cooking, etc.  Patient states her neighbors assist her, but unsure of total extent of her previous level of independence. Aaron Clark presents with the following problems and would benefit from occupational therapy to maximize independence with self-care,instrumental activities of daily living, and functional transfers/mobility for activities of daily living/instrumental activities of daily living via the stated goals. This section established at most recent assessment PROBLEM LIST (Impairments causing functional limitations): 
Decreased Strength Decreased ADL/Functional Activities Decreased Transfer Abilities Decreased Balance Increased Pain Decreased Activity Tolerance Decreased Flexibility/Joint Mobility Decreased [place] with Home Exercise Program 
 INTERVENTIONS PLANNED: (Benefits and precautions of occupational therapy have been discussed with the patient.) Activities of daily living training Therapeutic activity Therapeutic exercise TREATMENT PLAN: Frequency/Duration: Follow patient 3 times/week to address above goals. Rehabilitation Potential For Stated Goals: Good REHAB RECOMMENDATIONS (at time of discharge pending progress):   
Placement: It is my opinion, based on this patient's performance to date, that Ms. Lexis Dickey may benefit from intensive therapy at a [address] [place] [address] after discharge due to the functional deficits listed above that are likely to improve with skilled rehabilitation and concerns that he/she may be unsafe to be unsupervised at home due to pain, debility and decreased overall independence with ADL. Equipment:  
None at this time OCCUPATIONAL PROFILE AND HISTORY:  
History of Present Injury/Illness (Reason for Referral): 
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 Pt lives by herself, she was found down by her neighbor today around 1300 hrs. As per pt, she fell around 1000 hrs this AM. Pt reports recurrent falls, lives by herself. She reports of multiple bruises all over her body, and she reportedly hit her head today. She c/o mild to moderate pain diffusely throughout her body joints.  She denies fever, nausea/vomiting, chest pain, headache, weakness/numbness/tingling in extremities, diarrhea, urinary symptoms. ER physician attempted to make pt walk, but she is not able to stand up, needed moderate 2 ppl assistance and was noted to be unsteady. ER work up showed an unremarkable CT head, suspected left scapular tip fracture with left 6th rib fracture. Given pt is at high risk of falls and is unsafe being alone at home, and pain management, pt will admitted for PT/OT eval and placement. Past Medical History/Comorbidities: Ms. Keli Tidwell  has a past medical history of CN VI palsy (1/3/2013), Constipation, Fatigue (10/31/2012), GERD (gastroesophageal reflux disease), Glaucoma (1/3/2013), Hearing loss, sensorineural (1/3/2013), History of subdural hematoma (post traumatic), Hypercholesterolemia, Hyperlipemia (10/31/2012), Hyperlipidemia (10/31/2012), Hypertension (10/31/2012), Macular degeneration (1/3/2013), Mitral valve disorders(424.0) (10/31/2012), Osteoarthrosis, unspecified whether generalized or localized, lower leg (3/13/2013), Other malaise and fatigue (10/31/2012), Senile osteoporosis (10/31/2012), Skin cancer of scalp (2012), TIA (transient ischemic attack), and Unspecified hypothyroidism (10/31/2012). Ms. Keli Tidwell  has a past surgical history that includes pr rectal sensation test, balloon; hx appendectomy; hx colonoscopy; and hx malignant skin lesion excision. Social History/Living Environment:  
Home Environment: Private residence Prior Level of Function/Work/Activity: 
Modified independent with ADL. Neighbors help out a lot per patient. Previous Treatment Approaches: SNF Rehab in [place] per patient. Number of Personal Factors/Comorbidities that affect the Plan of Care: Expanded review of therapy/medical records (1-2):  MODERATE COMPLEXITY ASSESSMENT OF OCCUPATIONAL PERFORMANCE[de-identified]  
Activities of Daily Living:  
Basic ADLs (From Assessment) Complex ADLs (From Assessment) Feeding: Minimum assistance Oral Facial Hygiene/Grooming: Minimum assistance Bathing: Maximum assistance Upper Body Dressing: Moderate assistance Lower Body Dressing: Moderate assistance Toileting: Maximum assistance Instrumental ADL Meal Preparation: Total assistance Homemaking: Total assistance Grooming/Bathing/Dressing Activities of Daily Living Cognitive Retraining Safety/Judgement: Fall prevention Bed/Mat Mobility Supine to Sit: Moderate assistance Sit to Supine: Moderate assistance Sit to Stand: Moderate assistance Stand to Sit: Moderate assistance Scooting: Moderate assistance Most Recent Physical Functioning:  
Gross Assessment: 
AROM: Generally decreased, functional 
Strength: Generally decreased, functional 
         
  
Posture: 
  
Balance: 
Sitting: Impaired Sitting - Static: Fair (occasional) Sitting - Dynamic: Fair (occasional) Standing: Impaired Standing - Static: Constant support Standing - Dynamic : Constant support Bed Mobility: 
Supine to Sit: Moderate assistance Sit to Supine: Moderate assistance Scooting: Moderate assistance Wheelchair Mobility: 
  
Transfers: 
Sit to Stand: Moderate assistance Stand to Sit: Moderate assistance Patient Vitals for the past 6 hrs: 
 BP SpO2 Pulse 05/02/20 0802 121/70 95 % 67  
05/02/20 1132 115/64 97 % 94 Mental Status Neurologic State: Alert Orientation Level: Oriented to person Cognition: Follows commands Perception: Appears intact Perseveration: No perseveration noted Safety/Judgement: Fall prevention Physical Skills Involved: 
Range of Motion Balance Strength Activity Tolerance Pain (acute) Skin Integrity Cognitive Skills Affected (resulting in the inability to perform in a timely and safe manner): Comprehension Psychosocial Skills Affected: 
Habits/Routines Environmental Adaptation Social Interaction Social Roles Number of elements that affect the Plan of Care: 3-5:  MODERATE COMPLEXITY CLINICAL DECISION MAKING:  
 Vera Daily Activity Inpatient Short Form How much help from another person does the patient currently need. .. Total A Lot A Little None 1. Putting on and taking off regular lower body clothing? [] 1   [x] 2   [] 3   [] 4  
2. Bathing (including washing, rinsing, drying)? [] 1   [x] 2   [] 3   [] 4  
3. Toileting, which includes using toilet, bedpan or urinal?   [] 1   [x] 2   [] 3   [] 4  
4. Putting on and taking off regular upper body clothing? [] 1   [] 2   [x] 3   [] 4  
5. Taking care of personal grooming such as brushing teeth? [] 1   [] 2   [x] 3   [] 4  
6. Eating meals? [] 1   [] 2   [x] 3   [] 4  
© 2007, Trustees of [address] [place] [address], under license to "[employer]. All rights reserved Score:  Initial: 15 Most Recent: X (Date: -- ) Interpretation of Tool:  Represents activities that are increasingly more difficult (i.e. Bed mobility, Transfers, Gait). Medical Necessity:    
Patient demonstrates  
good 
 rehab potential due to higher previous functional level. Reason for Services/Other Comments: 
Patient continues to require skilled intervention due to Decreased independence with ADL. Matthieu Ra Use of outcome tool(s) and clinical judgement create a POC that gives a: LOW COMPLEXITY  
 
 
 
TREATMENT:  
(In addition to Assessment/Re-Assessment sessions the following treatments were rendered) Pre-treatment Symptoms/Complaints:   
Pain: Initial:  
Pain Intensity 1: 5  Post Session:  similar Assessment/Reassessment only, no treatment provided today Braces/Orthotics/Lines/Etc:  
IV 
O2 Device: Nasal cannula Treatment/Session Assessment:   
Response to Treatment:  No treatment rendered. Assessment only. Interdisciplinary Collaboration:  
Physical Therapist 
Occupational Therapist 
Registered Nurse After treatment position/precautions:  
Supine in bed Bed/Chair-wheels locked Call light within reach Side rails x 2  
 Compliance with Program/Exercises: Will assess as treatment progresses. Recommendations/Intent for next treatment session: \"Next visit will focus on advancements to more challenging activities\". Total Treatment Duration: OT Patient Time In/Time Out Time In: 2815 Time Out: 8291 Leilarayshawn Coleman, ELINOR, OTR/L

## 2020-05-02 NOTE — PROGRESS NOTES
Speech therapy note Attempted to see pt this am, however, pt unable to awake to safely accept PO.  
 
 Luke Becker MA/SAFIA/SLP

## 2020-05-02 NOTE — PROGRESS NOTES
Goals of care discussed with pt's niece, Ruth Ford ([phone]), her  is pt's POA. I have also talked to pt's neighbor, Darnell Jorge. It's been confirmed that pt has wished to be DNR/DNI. Plan of care discussed with both. Code status will be changed to DNR/DNI

## 2020-05-02 NOTE — PROGRESS NOTES
05/01/20 1919 Dual Skin Pressure Injury Assessment Dual Skin Pressure Injury Assessment X Second Care Provider (Based on [address] [place] [address]) Radha Hugo RN  
 
 
 09/29[birth year]83 1919 Skin Integumentary Skin Integumentary (WDL) X Skin Color Ecchymosis (comment) (bruising everywhere) Skin Condition/Temp Dry;Warm;Flaky;Fragile Skin Integrity Abrasion 
(right knee, right arm, left hand. ) Turgor Epidermis thin w/ loss of subcut tissue Hair Growth Absent Varicosities Absent

## 2020-05-02 NOTE — PROGRESS NOTES
Redressed skin tear to pt R knee. Pt pulling at dressings and moaning. Medicated per MAR. Will continue to monitor

## 2020-05-02 NOTE — PROGRESS NOTES
pt has not voided this shift. she keeps reporting urgency but has not voided yet. she is hooked up to [address] but nothing has appeared. bladder scan performed and 500 cc noted. Hospitalist made aware. Order received to place urinary cath and collect UA and send to lab.

## 2020-05-02 NOTE — PROGRESS NOTES
HOSPITALIST PROGRESS NOTE 
NAME:  Halle Coates Age:  80 y.o. 
:   1923 MRN:   [id number] PCP: Della Lopez MD 
Consulting MD: Treatment Team: Attending Provider: Alexus Wise MD 
SUBJECTIVE:  
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 ER physician attempted to make pt walk, but she is not able to stand up, needed moderate 2 ppl assistance and was noted to be unsteady. ER work up showed an unremarkable CT head, suspected left scapular tip fracture with left 6th rib fracture. Given pt is at high risk of falls and is unsafe being alone at home, and pain management, pt will admitted for PT/OT eval and placement. : 
Pt sleeping, hard of hearing. Easy to arouse but have to literally scream in her right ear. Pt reports difficulty in swallowing, made NPO this AM. Speech consulted. No fever, chills, diarrhea, nausea/vomiting, headache Complete ROS done and is as stated above or otherwise negative Objective:  
 
Visit Vitals /76 (BP 1 Location: Right arm, BP Patient Position: At rest) Pulse (!) 101 Temp 98.7 °F (37.1 °C) Resp 20 Ht 5' 7\" (1.702 m) Wt 55.8 kg (123 lb) SpO2 94% BMI 19.26 kg/m² Temp (24hrs), Av.1 °F (36.7 °C), Min:97.5 °F (36.4 °C), Max:98.7 °F (37.1 °C) Oxygen Therapy O2 Sat (%): 94 % (20 0435) Pulse via Oximetry: 72 beats per minute (20 1442) O2 Device: Nasal cannula (20 1257) O2 Flow Rate (L/min): 2 l/min (20 1257) Physical Exam: 
General:    Frail, cachectic, elderly, hard of hearing, mild distress Head:   Normocephalic, without obvious abnormality, atraumatic. Nose:  Nares normal. No drainage or sinus tenderness. Lungs:   Clear to auscultation bilaterally. No Wheezing or Rhonchi.  No rales. 
Heart:   Regular rate and rhythm,  no murmur, rub or gallop. Abdomen:   Soft, non-tender. Not distended. Bowel sounds normal.  
Extremities: Multiple superficial bruises on bilateral upper and lower extremities with superficial skin abrasion on left wrist and bilateral knees Skin:     Texture, turgor normal. No rashes or lesions. Not Jaundiced Neurologic: gcs 15, moves all 4 extremities spontaneously Psych:             AOx3, mood and affect flat Data Review:  
Recent Results (from the past 24 hour(s)) MYOGLOBIN Collection Time: 05/01/20  1:40 PM  
Result Value Ref Range Myoglobin 860 (H) 9 - 82 ng/mL CK Collection Time: 05/01/20  1:40 PM  
Result Value Ref Range  21 - 215 U/L  
CBC WITH AUTOMATED DIFF Collection Time: 05/01/20  2:03 PM  
Result Value Ref Range WBC 7.9 4.3 - 11.1 K/uL  
 RBC 2.52 (L) 4.05 - 5.2 M/uL HGB 9.2 (L) 11.7 - 15.4 g/dL HCT 27.4 (L) 35.8 - 46.3 % .7 (H) 79.6 - 97.8 FL  
 MCH 36.5 (H) 26.1 - 32.9 PG  
 MCHC 33.6 31.4 - 35.0 g/dL  
 RDW 14.3 11.9 - 14.6 % PLATELET 881 (L) 719 - 450 K/uL MPV 12.2 9.4 - 12.3 FL ABSOLUTE NRBC 0.00 0.0 - 0.2 K/uL  
 DF AUTOMATED NEUTROPHILS 84 (H) 43 - 78 % LYMPHOCYTES 6 (L) 13 - 44 % MONOCYTES 8 4.0 - 12.0 % EOSINOPHILS 0 (L) 0.5 - 7.8 % BASOPHILS 0 0.0 - 2.0 % IMMATURE GRANULOCYTES 1 0.0 - 5.0 %  
 ABS. NEUTROPHILS 6.6 1.7 - 8.2 K/UL  
 ABS. LYMPHOCYTES 0.5 0.5 - 4.6 K/UL  
 ABS. MONOCYTES 0.7 0.1 - 1.3 K/UL  
 ABS. EOSINOPHILS 0.0 0.0 - 0.8 K/UL  
 ABS. BASOPHILS 0.0 0.0 - 0.2 K/UL  
 ABS. IMM. GRANS. 0.1 0.0 - 0.5 K/UL METABOLIC PANEL, COMPREHENSIVE Collection Time: 05/01/20  4:15 PM  
Result Value Ref Range Sodium 138 136 - 145 mmol/L Potassium 4.4 3.5 - 5.1 mmol/L Chloride 104 98 - 107 mmol/L  
 CO2 27 21 - 32 mmol/L Anion gap 7 7 - 16 mmol/L Glucose 135 (H) 65 - 100 mg/dL BUN 25 (H) 8 - 23 MG/DL  Creatinine 0.86 0.6 - 1.0 MG/DL  
 GFR est AA >60 >60 ml/min/1.73m2 GFR est non-AA >60 >60 ml/min/1.73m2 Calcium 8.8 8.3 - 10.4 MG/DL Bilirubin, total 0.8 0.2 - 1.1 MG/DL  
 ALT (SGPT) 20 12 - 65 U/L  
 AST (SGOT) 21 15 - 37 U/L Alk. phosphatase 73 50 - 136 U/L Protein, total 6.7 6.3 - 8.2 g/dL Albumin 3.4 3.2 - 4.6 g/dL Globulin 3.3 2.3 - 3.5 g/dL A-G Ratio 1.0 (L) 1.2 - 3.5 URINE MICROSCOPIC Collection Time: 05/01/20  5:06 PM  
Result Value Ref Range WBC 0 0 /hpf  
 RBC 0 0 /hpf Epithelial cells 0 0 /hpf Bacteria 3+ (H) 0 /hpf Casts 0 0 /lpf Crystals, urine 0 0 /LPF Amorphous Crystals 2+ (H) 0 Mucus 0 0 /lpf Other observations RESULTS VERIFIED MANUALLY Imaging Olimpia Whitt Si Clinical History: The patient is a 80years year old Female presenting with 
symptoms of L shoulder pain after fall. 
  
Comparison:  Left humerus films 5/1/2020 
  
Findings: 
  
3 views of the left shoulder were obtained.  
  
There is slight deformity of the left posterior lateral sixth rib. In addition 
there is suggestion of a fracture of the scapular tip. Diffuse osteopenia 
otherwise limits evaluation. 
  
Joint spaces are well-maintained. 
  
The visualized lung field is unremarkable. Calcification is seen in the left 
lateral chest wall/breast soft tissues 
  
IMPRESSION Impression: 
  
  
1. Suspected scapular tip fracture with questionable additional injury involving 
the left posterior lateral sixth rib. Further evaluation with a chest CT should 
be considered. 
  
 
Clinical History: The patient is a 80years year old Female presenting with 
symptoms of L humerus pain after fall. 
  
Comparison:  none 
  
Findings: 
  
2 views of the left humerus were obtained.  
  
No fracture or dislocation is identified. There is diffuse osteopenia. 
  
Degenerative changes are suggested throughout the elbow. 
  
IMPRESSION Impression: 
  
No acute osseous or joint abnormalities. EXAM: CT HEAD WITHOUT CONTRAST 
  
INDICATION: Found down. Head trauma. 
  
COMPARISON: Head CT 12/14/2018 
  
TECHNIQUE: Contiguous axial images were obtained from the skull base through the 
vertex without IV contrast. Radiation dose reduction techniques were used for 
this study. Our CT scanners use one or all of the following: Automated exposure 
control, adjustment of the mA and/or kV according to patient size, iterative 
reconstruction. 
  
FINDINGS:  
Global parenchymal volume loss and ex vacuo ventriculomegaly. Periventricular 
white matter hypoattenuation reflects sequelae of small vessel ischemic disease. No acute infarction or hemorrhage is evident. No hydrocephalus or midline shift. No extra-axial mass or hemorrhage. The basal cisterns are patent.  
  
The visualized portions of the orbits are normal. The mastoid air cells and 
paranasal sinuses are patent. 
  
The visualized vascular structures have an unremarkable noncontrast appearance. 
  
The calvarium and soft tissues appear normal. 
  
IMPRESSION IMPRESSION:  
No acute intracranial abnormality. Assessment and Plan: Active Hospital Problems Diagnosis Date Noted  Recurrent falls 05/01/2020 Priority: 1 - One  
 Fracture of one rib, left side, initial encounter for closed fracture 05/01/2020 Priority: 2 - Two  Closed left scapular fracture 05/01/2020 Priority: 3 - Three  At risk for unsafe behavior 05/01/2020 Priority: 4 - Four  Debility 05/01/2020 Priority: 5 - Five  Physical deconditioning 05/01/2020  Wound of right leg 01/22/2019  Macular degeneration 01/03/2013 Dr. Bob Gunn  Hearing loss, sensorineural 01/03/2013  CN VI palsy 01/03/2013 Spring 2012- Dr. Bob Gunn  Glaucoma 01/03/2013 Dr. Bob Gunn  Hypothyroidism 10/31/2012  Hyperlipidemia 10/31/2012  Hypertension 10/31/2012  Senile osteoporosis 10/31/2012 Dr. Elle Rice PLAN 
 · Speech evaluation for dysphagia · NPO except meds · Pain control with tramadol and norco prn 
· PT/OT eval 
· PPD ordered · BP optimally controlled, continue to monitor · Resume synthroid · Continue other home medications including eye drops · IV fluids for hydration · DVT prophylaxis with scd · Gi prophylaxis with PPI Code Status: full High risk Disposition: pending Signed By: Wali Decker MD   
 May 2, 2020

## 2020-05-02 NOTE — PROGRESS NOTES
Pt with significant pain throughout shift. Medicated per MAR. Pt extremely hard of hearing and had difficulty swallowing pills this am. MD made aware. Pt made NPO and speech evaluation ordered. Pt very tearful, confused and with little to no safety awareness this pm. Lap belt placed on pt after second attempt to get out of bed without assistance. Pt moaning and guarding all over her body. Continues to be unable to hear or see well. Hourly rounds performed through shift, pt denies needs at this time. Bed in low position and call light/ personal items within reach. Will continue to monitor and report to day shift nurse.

## 2020-05-02 NOTE — PROGRESS NOTES
Givens catheter 16f inserted utilizing sterile procedure. Pt tolerated well. Pt had immediate urine ouptut of 300 yellow urine.

## 2020-05-02 NOTE — PROGRESS NOTES
TRANSFER - IN REPORT: 
 
Verbal report received from [facility] RN(name) on [place] [place] [address]  being received from ED(unit) for routine progression of care Report consisted of patients Situation, Background, Assessment and  
Recommendations(SBAR). Information from the following report(s) SBAR, OR Summary, Intake/Output and Recent Results was reviewed with the receiving nurse. Opportunity for questions and clarification was provided. Assessment completed upon patients arrival to unit and care assumed.

## 2020-05-02 NOTE — PROGRESS NOTES
Problem: Mobility Impaired (Adult and Pediatric) Goal: *Acute Goals and Plan of Care (Insert Text) Description: LTG: 
(1.)Ms. Lexis Dickey will move from supine to sit and sit to supine  in bed with MODERATE ASSIST within 7 treatment day(s). (2.)Ms. Lexis Dickey will transfer from bed to chair and chair to bed with MODERATE ASSIST using the least restrictive device within 7 treatment day(s). (3.)Ms. Lexis Dickey will ambulate with MODERATE ASSIST for 10 feet with the least restrictive device within 7 treatment day(s). (4)Ms. Lexis Dickey will perform HEP with cues and assistance to increase safety on his feet in 7 days. ________________________________________________________________________________________________ Outcome: Progressing Towards Goal 
  
PHYSICAL THERAPY: Initial Assessment and PM 5/2/2020 INPATIENT: PT Visit Days : 1 Payor: Tavares Sawyer MMP / Plan: [email or web address] MMP / Product Type: [facility] Care Medicare /   
  
NAME/AGE/GENDER: Luz Maria Pitts is a 80 y.o. female PRIMARY DIAGNOSIS: Recurrent falls [R29.6] Macular degeneration [H35.30] At risk for unsafe behavior [Z91.89] Fracture of one rib, left side, initial encounter for closed fracture [S22.32XA] Closed left scapular fracture [S42.102A] Debility [R53.81] Physical deconditioning [R53.81] Recurrent falls Recurrent falls ICD-10: Treatment Diagnosis:  
 Generalized Muscle Weakness (M62.81) Other abnormalities of gait and mobility (R26.89) History of falling (Z91.81) Precaution/Allergies: 
Patient has no known allergies. ASSESSMENT:  
 
Ms. Lexis Dickey presents with significant debility. She is very [place] and it is difficult to get any history. She did know the date and reports that she lives alone and has neighbors who help her. She uses a rollator. She complains of left sided rib pain. She needed significant assistance to sit up.   She was able to sit on the edge of bed fairly well but then required significant assistance to stand at bedside with posterior lean. She was unable to take any steps and is not safe on her feet. She is not safe to return home alone. Will follow. At this time, patient is appropriate for Co-treatment with occupational therapy due to patient's decreased overall endurance/tolerance levels, as well as need for high level skilled assistance to complete functional transfers/mobility and functional tasks. Kee Dawson is appropriate for a multidisciplinary co-treatment of PT and OT to address goals of both disciplines. This section established at most recent assessment PROBLEM LIST (Impairments causing functional limitations): 
Decreased Strength Decreased ADL/Functional Activities Decreased Transfer Abilities Decreased Ambulation Ability/Technique Decreased Balance Increased Pain Decreased Activity Tolerance Increased Fatigue Decreased Flexibility/Joint Mobility Decreased Knowledge of Precautions Decreased [place] with Home Exercise Program 
 INTERVENTIONS PLANNED: (Benefits and precautions of physical therapy have been discussed with the patient.) Balance Exercise Bed Mobility Gait Training Home Exercise Program (HEP) Range of Motion (ROM) Therapeutic Activites Therapeutic Exercise/Strengthening Transfer Training TREATMENT PLAN: Frequency/Duration: daily for duration of hospital stay Rehabilitation Potential For Stated Goals: Fair REHAB RECOMMENDATIONS (at time of discharge pending progress):   
Placement: 
snf 
Equipment:  
None at this time HISTORY:  
History of Present Injury/Illness (Reason for Referral): 
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  
Pt lives by herself, she was found down by her neighbor today around 56 hrs. As per pt, she fell around 1000 hrs this AM. Pt reports recurrent falls, lives by herself. She reports of multiple bruises all over her body, and she reportedly hit her head today. She c/o mild to moderate pain diffusely throughout her body joints. She denies fever, nausea/vomiting, chest pain, headache, weakness/numbness/tingling in extremities, diarrhea, urinary symptoms. ER physician attempted to make pt walk, but she is not able to stand up, needed moderate 2 ppl assistance and was noted to be unsteady. ER work up showed an unremarkable CT head, suspected left scapular tip fracture with left 6th rib fracture. Given pt is at high risk of falls and is unsafe being alone at home, and pain management, pt will admitted for PT/OT eval and placement. Past Medical History/Comorbidities: Ms. Lauren Nielsen  has a past medical history of CN VI palsy (1/3/2013), Constipation, Fatigue (10/31/2012), GERD (gastroesophageal reflux disease), Glaucoma (1/3/2013), Hearing loss, sensorineural (1/3/2013), History of subdural hematoma (post traumatic), Hypercholesterolemia, Hyperlipemia (10/31/2012), Hyperlipidemia (10/31/2012), Hypertension (10/31/2012), Macular degeneration (1/3/2013), Mitral valve disorders(424.0) (10/31/2012), Osteoarthrosis, unspecified whether generalized or localized, lower leg (3/13/2013), Other malaise and fatigue (10/31/2012), Senile osteoporosis (10/31/2012), Skin cancer of scalp (2012), TIA (transient ischemic attack), and Unspecified hypothyroidism (10/31/2012). Ms. Lauren Nielsen  has a past surgical history that includes pr rectal sensation test, balloon; hx appendectomy; hx colonoscopy; and hx malignant skin lesion excision. Social History/Living Environment:  
Home Environment: Private residence Current DME Used/Available at Home: ricardo Conde Prior Level of Function/Work/Activity: 
Falling, rollator, lives alone Number of Personal Factors/Comorbidities that affect the Plan of Care: 3+: HIGH COMPLEXITY EXAMINATION:  
Most Recent Physical Functioning:  
Gross Assessment: 
AROM: Generally decreased, functional 
Strength: Generally decreased, functional 
         
  
Posture: 
  
Balance: 
Sitting: Impaired Sitting - Static: Fair (occasional) Sitting - Dynamic: Fair (occasional) Standing: Impaired;Pull to stand; With support Standing - Static: Constant support Standing - Dynamic : Constant support Bed Mobility: 
Supine to Sit: Additional time;Maximum assistance Sit to Supine: Additional time;Maximum assistance Scooting: Maximum assistance; Additional time Wheelchair Mobility: 
  
Transfers: 
Sit to Stand: Maximum assistance Stand to Sit: Maximum assistance Duration: 8 Minutes Gait:unable Body Structures Involved: 
Bones Joints Muscles Ligaments Body Functions Affected: Movement Related Activities and Participation Affected: Mobility Number of elements that affect the Plan of Care: 3: MODERATE COMPLEXITY CLINICAL PRESENTATION:  
Presentation: Stable and uncomplicated: LOW COMPLEXITY CLINICAL DECISION MAKIN [facility] [address] [id number] AM-PAC 6 Clicks Basic Mobility Inpatient Short Form How much difficulty does the patient currently have. .. Unable A Lot A Little None 1. Turning over in bed (including adjusting bedclothes, sheets and blankets)? [] 1   [x] 2   [] 3   [] 4  
2. Sitting down on and standing up from a chair with arms ( e.g., wheelchair, bedside commode, etc.)   [] 1   [x] 2   [] 3   [] 4  
3. Moving from lying on back to sitting on the side of the bed? [] 1   [x] 2   [] 3   [] 4 How much help from another person does the patient currently need. .. Total A Lot A Little None 4. Moving to and from a bed to a chair (including a wheelchair)? [] 1   [x] 2   [] 3   [] 4  
5. Need to walk in hospital room? [x] 1   [] 2   [] 3   [] 4  
6. Climbing 3-5 steps with a railing?    [x] 1   [] 2   [] 3   [] 4  
 © 2007, Trustees of [address] [place] [address] [address] [address], under license to [employer]. All rights reserved Score:  Initial: 10 Most Recent: X (Date: -- ) Interpretation of Tool:  Represents activities that are increasingly more difficult (i.e. Bed mobility, Transfers, Gait). Medical Necessity:    
Patient is expected to demonstrate progress in  
strength, range of motion, and balance 
 to  
decrease assistance required with exercises and functional mobility Shaaron Nonoba Reason for Services/Other Comments: 
Patient  
continues to require present interventions due to patient's inability to perform exercises and functional mobility independently Shaaron Money Use of outcome tool(s) and clinical judgement create a POC that gives a: Questionable prediction of patient's progress: MODERATE COMPLEXITY  
  
 
 
 
TREATMENT:  
(In addition to Assessment/Re-Assessment sessions the following treatments were rendered) Pre-treatment Symptoms/Complaints:  rib pain left Pain: Initial:  
   Post Session:  unable to rate  
assessment Therapeutic Activity: (  8 Minutes ):  Therapeutic activities including Bed transfers and standing, rolling, sitting balance to improve mobility, strength, and balance. Required maximal   to promote static balance in standing. Braces/Orthotics/Lines/Etc:  
IV 
O2 Device: Nasal cannula Treatment/Session Assessment:   
Response to Treatment:  debility Interdisciplinary Collaboration: Occupational Therapist 
Registered Nurse After treatment position/precautions:  
Supine in bed Bed/Chair-wheels locked Bed in low position Call light within reach RN notified Compliance with Program/Exercises: Will assess as treatment progresses Recommendations/Intent for next treatment session: \"Next visit will focus on reduction in assistance provided\". Total Treatment Duration: PT Patient Time In/Time Out Time In: 3456 Time Out: 7750 Brigid Rivas PT

## 2020-05-03 NOTE — PROGRESS NOTES
patient is screaming and crying. she keeps saying no one is talking to her. Doctor and various staff have been in room throughout shift. Pt becoming more disoriented. Pt pulled off bandage to R arm and bleeding present. Pt attempting to pull out IV. This nurse attempted to call pt's neighbor Erich Porter in order to reassure pt that home is being taken care of, but no answer. VM left for neighbor to call back. Hospitalist made aware.

## 2020-05-03 NOTE — PROGRESS NOTES
Pt continuously confused. Calling out into [facility]ar for people not there. Removing 02. Crying out in pain. Tearful and regressive. Pt removing clothes multiple times this shift and attempting to slide from bed. Lap belt replaced. Pt PO medications held due to no command following and difficulty swallowing yesterday. Hourly rounds performed through shift, pt denies needs at this time. Bed in low position and call light/ personal items within reach. Will continue to monitor and report to day shift nurse.

## 2020-05-03 NOTE — PROGRESS NOTES
Dressing to pt's L side of neck placed. moderate amount of bleeding noted. Xeroform and gauze placed on neck. Pt repositioned. Pt calm at the moment. Trying to eat. Bed low and locked. Hourly rounds completed this shift.

## 2020-05-03 NOTE — PROGRESS NOTES
SPEECH LANGUAGE PATHOLOGY: DYSPHAGIA- Initial Assessment and Discharge NAME/AGE/GENDER: Basilio Horner is a 80 y.o. female DATE: 5/3/2020 PRIMARY DIAGNOSIS: Recurrent falls [R29.6] Macular degeneration [H35.30] At risk for unsafe behavior [Z91.89] Fracture of one rib, left side, initial encounter for closed fracture [S22.32XA] Closed left scapular fracture [S42.102A] Debility [R53.81] Physical deconditioning [R53.81] ICD-10: Treatment Diagnosis: R13.11 Dysphagia, Oral Phase RECOMMENDATIONS  
DIET:  
? PO:  Mechanical soft ? Liquids:  regular thin MEDICATIONS: With liquid ASPIRATION PRECAUTIONS · Slow rate of intake · Small bites/sips · Upright at 90 degrees during meal 
  
 
  
EDUCATION: 
· Recommendations discussed with Nursing · Patient ? Adri Fox RECOMMENDATIONS for CONTINUED SPEECH THERAPY:  
No further speech therapy indicated at this time. ASSESSMENT Patient presents with normal oropharyngeal swallow function. No overt signs/sx of aspiration with any textures presented. Recommend mechanical soft/thin medications as tolerated PLAN   
FREQUENCY/DURATION: No further speech therapy indicated at this time as oropharyngeal swallow function is within normal limits. - Recommendations for next treatment session: No additional speech therapy indicated at this time. SUBJECTIVE Confused. [place]. Unable to hear even with HA. Yelling out.  
History of Present Injury/Illness: Ms. 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), Constipation, Fatigue (10/31/2012), GERD (gastroesophageal reflux disease), Glaucoma (1/3/2013), Hearing loss, sensorineural (1/3/2013), History of subdural hematoma (post traumatic), Hypercholesterolemia, Hyperlipemia (10/31/2012), Hyperlipidemia (10/31/2012), Hypertension (10/31/2012), Macular degeneration (1/3/2013), Mitral valve disorders(424.0) (10/31/2012), Osteoarthrosis, unspecified whether generalized or localized, lower leg (3/13/2013), Other malaise and fatigue (10/31/2012), Senile osteoporosis (10/31/2012), Skin cancer of scalp (2012), TIA (transient ischemic attack), and Unspecified hypothyroidism (10/31/2012). . She also  has a past surgical history that includes pr rectal sensation test, balloon; hx appendectomy; hx colonoscopy; and hx malignant skin lesion excision. Problem List:  (Impairments causing functional limitations): 1. dysphagia Previous Dysphagia: NONE REPORTED Diet Prior to Evaluation: NPO Orientation:  
Person Pain: Pain Scale 1: Visual 
Pain Intensity 1: 0 Cognitive-Linguistic Screen: 
? Speech Production:  
o WNL ? Expressive Language: 
o WNL 
o  
? Receptive Language: 
o Impaired 
o Very [place] ? Cognition:  
o Impaired 
o Very [place] OBJECTIVE Oral Motor:  
· Labial: No impairment · Dentition: Upper Dentures and Lower Dentures · Oral Hygiene: Adequate · Lingual: No impairment Swallow evaluation: 
 Patient consumed trials of thin via cup and straw, pureed and mixed. Solid not assessed. Appropriate oral prep. Timely swallow. Adequate oral clearing. No overt signs/sx of aspiration with presented trials. INTERDISCIPLINARY COLLABORATION: Registered Nurse PRECAUTIONS/ALLERGIES: Patient has no known allergies. Tool Used: Dysphagia Outcome and Severity Scale (SANDEE) Score Comments Normal Diet  [] 7 With no strategies or extra time needed Functional Swallow  [] 6 May have mild oral or pharyngeal delay Mild Dysphagia  [] 5 Which may require one diet consistency restricted Mild-Moderate Dysphagia  [] 4 With 1-2 diet consistencies restricted Moderate Dysphagia  [] 3 With 2 or more diet consistencies restricted Moderate-Severe Dysphagia  [] 2 With partial PO strategies (trials with ST only) Severe Dysphagia  [] 1 With inability to tolerate any PO safely Score:  Initial: 6 Most Recent:  (Date 05/03/20 ) Interpretation of Tool: The Dysphagia Outcome and Severity Scale (SANDEE) is a simple, easy-to-use, 7-point scale developed to systematically rate the functional severity of dysphagia based on objective assessment and make recommendations for diet level, independence level, and type of nutrition. Current Medications: No current facility-administered medications on file prior to encounter. Current Outpatient Medications on File Prior to Encounter Medication Sig Dispense Refill  levothyroxine (SYNTHROID) 50 mcg tablet TAKE ONE (1) TABLET BY MOUTH DAILY. 90 Tab 2  
 fluticasone propionate (FLONASE) 50 mcg/actuation nasal spray USE 2 SPRAYS IN EACH NOSTRIL ONCE DAILY 3 Bottle 3  
 simvastatin (ZOCOR) 40 mg tablet TAKE <1> TABLET BY MOUTH NIGHTLY (Patient taking differently: TAKE 1 TABLET BY MOUTH NIGHTLY) 90 Tab 2  vits A,C,E/lutein/minerals (VISION FORMULA, WITH LUTEIN, PO) Take 1 Tab by mouth daily.  aspirin 81 mg chewable tablet Take 81 mg by mouth daily.  lubiPROStone (AMITIZA) 8 mcg capsule Take 1 Cap by mouth daily (with breakfast). 90 Cap 3  
 traMADol (ULTRAM) 50 mg tablet Take 1 Tab by mouth every eight (8) hours as needed for Pain. Max Daily Amount: 150 mg. 30 Tab 0  
 enalapril (VASOTEC) 10 mg tablet Take 1 Tab by mouth two (2) times a day. 180 Tab 3  
 lidocaine (LIDODERM) 5 % 1 Patch by TransDERmal route every twenty-four (24) hours. Apply patch to the affected area for 12 hours a day and remove for 12 hours a day. 1 Each 5  
 simethicone 125 mg chewable tablet Take 125 mg by mouth every six (6) hours as needed for Flatulence. 60 Tab 0  
 brimonidine (ALPHAGAN P) 0.1 % ophthalmic solution Administer 1 Drop to right eye every eight (8) hours.  esomeprazole (NEXIUM) 40 mg capsule Take 40 mg by mouth daily.  brimonidine (ALPHAGAN) 0.15 % ophthalmic solution Administer 1 Drop to right eye three (3) times daily.  acetaminophen (TYLENOL) 500 mg tablet Take 650 mg by mouth every six (6) hours as needed for Pain.  clobetasol (TEMOVATE) 0.05 % external solution Apply 1 Drop to affected area two (2) times a day. apply to left upper arm twice a day  dorzolamide-timolol (COSOPT) 2-0.5 % ophthalmic solution Administer 1 Drop to both eyes two (2) times a day.  ketoconazole (NIZORAL) 2 % shampoo Apply 1 mL to affected area daily. apply to hair and let sit for 3 min and rinse out  TRAVATAN Z 0.004 % ophthalmic solution Administer 1 Drop to right eye nightly.  calcium-cholecalciferol, d3, (CALCIUM 600 + D) 600-125 mg-unit Tab Take 1 Tab by mouth two (2) times a day.  polyethylene glycol (MIRALAX) 17 gram packet Take 17 g by mouth daily. mixed with water or juice  Cholecalciferol, Vitamin D3, (VITAMIN D3) 1,000 unit cap Take 1 Tab by mouth daily. After treatment position/precautions: · Upright in bed · RN notified · Call light within reach Total Treatment Duration:  
Time In: 0820 Time Out: 0830  Miesha Lopez MA/CCC/SLP

## 2020-05-03 NOTE — PROGRESS NOTES
Hospitalist Progress Note Admit Date:  2020 12:37 PM  
Name:  Aaron Clark Age:  80 y.o. 
:  1923 MRN:  [id number] PCP:  Vidhi Love MD 
Treatment Team: Attending Provider: Willow Bermudez MD 
 
Subjective: May 3, 2020 patient seen and evaluated. Yelling out for staff. Does not want to be left alone in the room. Notes some belly pain after eating. Has gotten pain medicine about 30 minutes ago Objective:  
 
Patient Vitals for the past 24 hrs: 
 Temp Pulse Resp BP SpO2  
20 0726 97.9 °F (36.6 °C) 80 18 133/73 100 % 20 0404 99.1 °F (37.3 °C) 79 18 116/53 (!) 88 % 20 2354 99.9 °F (37.7 °C) (!) 103 17 116/68 (!) 89 % 20 1948 98.8 °F (37.1 °C) 92 18 130/75 90 % 20 1541 98.1 °F (36.7 °C) 66 17 111/63 90 % 20 1132 97.9 °F (36.6 °C) 94 18 115/64 97 % Oxygen Therapy O2 Sat (%): 100 % (20 0726) Pulse via Oximetry: 72 beats per minute (20 1442) O2 Device: Nasal cannula (20 1257) O2 Flow Rate (L/min): 2 l/min (20 1257) Intake/Output Summary (Last 24 hours) at 5/3/2020 7633 Last data filed at 2020 2354 Gross per 24 hour Intake  Output 375 ml Net -375 ml General:    Frail elderly female in no obvious cardiopulmonary distress, nasal cannula out of her nose; bruising in the upper chest wall; alert. CV:   RRR. No murmur, rub, or gallop. Lungs:   CTAB. No wheezing, rhonchi, or rales. Abdomen:   Soft, nontender, nondistended. Extremities: Warm and dry. No cyanosis or edema. Skin:     No rashes or jaundice. Data Review: 
I have reviewed all labs, meds, telemetry events, and studies from the last 24 hours. Recent Results (from the past 24 hour(s)) URINALYSIS W/ RFLX MICROSCOPIC Collection Time: 20  2:17 PM  
Result Value Ref Range Color YELLOW Appearance CLEAR Specific gravity 1.011 1.001 - 1.023    
 pH (UA) 6.5 5.0 - 9.0 Protein TRACE (A) NEG mg/dL Glucose Negative mg/dL Ketone Negative NEG mg/dL Bilirubin Negative NEG Blood Negative NEG Urobilinogen 1.0 0.2 - 1.0 EU/dL Nitrites Negative NEG Leukocyte Esterase Negative NEG    
 WBC 3-5 0 /hpf  
 RBC 0-3 0 /hpf Epithelial cells 0 0 /hpf Bacteria 0 0 /hpf Casts 0 0 /lpf PLEASE READ & DOCUMENT PPD TEST IN 24 HRS Collection Time: 05/02/20  9:03 PM  
Result Value Ref Range PPD Negative Negative  
 mm Induration 0 0 - 5 mm METABOLIC PANEL, COMPREHENSIVE Collection Time: 05/03/20  5:52 AM  
Result Value Ref Range Sodium 138 136 - 145 mmol/L Potassium 4.0 3.5 - 5.1 mmol/L Chloride 104 98 - 107 mmol/L  
 CO2 25 21 - 32 mmol/L Anion gap 9 7 - 16 mmol/L Glucose 75 65 - 100 mg/dL BUN 27 (H) 8 - 23 MG/DL Creatinine 0.75 0.6 - 1.0 MG/DL  
 GFR est AA >60 >60 ml/min/1.73m2 GFR est non-AA >60 >60 ml/min/1.73m2 Calcium 8.7 8.3 - 10.4 MG/DL Bilirubin, total 1.5 (H) 0.2 - 1.1 MG/DL  
 ALT (SGPT) 23 12 - 65 U/L  
 AST (SGOT) 43 (H) 15 - 37 U/L Alk. phosphatase 73 50 - 136 U/L Protein, total 6.3 6.3 - 8.2 g/dL Albumin 3.0 (L) 3.2 - 4.6 g/dL Globulin 3.3 2.3 - 3.5 g/dL A-G Ratio 0.9 (L) 1.2 - 3.5    
CBC W/O DIFF Collection Time: 05/03/20  5:52 AM  
Result Value Ref Range WBC 9.8 4.3 - 11.1 K/uL  
 RBC 2.96 (L) 4.05 - 5.2 M/uL  
 HGB 10.4 (L) 11.7 - 15.4 g/dL HCT 32.3 (L) 35.8 - 46.3 % .1 (H) 79.6 - 97.8 FL  
 MCH 35.1 (H) 26.1 - 32.9 PG  
 MCHC 32.2 31.4 - 35.0 g/dL  
 RDW 14.3 11.9 - 14.6 % PLATELET 722 856 - 531 K/uL MPV 12.3 9.4 - 12.3 FL ABSOLUTE NRBC 0.00 0.0 - 0.2 K/uL All Micro Results None Current Meds: 
Current Facility-Administered Medications Medication Dose Route Frequency  aspirin chewable tablet 81 mg  81 mg Oral DAILY  brimonidine (ALPHAGAN) 0.2 % ophthalmic solution 1 Drop  1 Drop Right Eye Q8H  
  calcium-vitamin D (OS-JERICHO) 500 mg-200 unit tablet  1 Tab Oral BID  dorzolamide-timoloL (COSOPT) 22.3-6.8 mg/mL ophthalmic solution 1 Drop  1 Drop Both Eyes BID  pantoprazole (PROTONIX) tablet 40 mg  40 mg Oral ACB  fluticasone propionate (FLONASE) 50 mcg/actuation nasal spray 2 Spray  2 Spray Both Nostrils DAILY  levothyroxine (SYNTHROID) tablet 50 mcg  50 mcg Oral ACB  lubiPROStone (AMITIZA) capsule 8 mcg (Patient Supplied)  8 mcg Oral DAILY  simethicone (MYLICON) tablet 889 mg  120 mg Oral Q6H PRN  
 atorvastatin (LIPITOR) tablet 20 mg  20 mg Oral QHS  sodium chloride (NS) flush 5-40 mL  5-40 mL IntraVENous Q8H  
 sodium chloride (NS) flush 5-40 mL  5-40 mL IntraVENous PRN  
 acetaminophen (TYLENOL) tablet 650 mg  650 mg Oral Q4H PRN  
 HYDROcodone-acetaminophen (NORCO) 5-325 mg per tablet 1 Tab  1 Tab Oral Q8H PRN  
 naloxone (NARCAN) injection 0.4 mg  0.4 mg IntraVENous PRN  
 ondansetron (ZOFRAN) injection 4 mg  4 mg IntraVENous Q4H PRN  
 HYDROmorphone (PF) (DILAUDID) injection 0.5 mg  0.5 mg IntraVENous Q3H PRN Other Studies (last 24 hours): No results found. Assessment and Plan:  
 
Hospital Problems as of 5/3/2020 Date Reviewed: 3/13/2019 Codes Class Noted - Resolved POA Debility ICD-10-CM: R53.81 ICD-9-CM: 799.3  5/1/2020 - Present Unknown Physical deconditioning ICD-10-CM: R53.81 ICD-9-CM: 799.3  5/1/2020 - Present Unknown Closed left scapular fracture ICD-10-CM: B14.973K ICD-9-CM: 811.00  5/1/2020 - Present Unknown * (Principal) Recurrent falls ICD-10-CM: R29.6 ICD-9-CM: V15.88  5/1/2020 - Present Unknown Fracture of one rib, left side, initial encounter for closed fracture ICD-10-CM: S22.32XA ICD-9-CM: 807.01  5/1/2020 - Present Unknown At risk for unsafe behavior ICD-10-CM: Z91.89 ICD-9-CM: V15.89  5/1/2020 - Present Unknown Wound of right leg ICD-10-CM: P22.405A ICD-9-CM: 891.0  1/22/2019 - Present Yes CN VI palsy (Chronic) ICD-10-CM: H49.20 ICD-9-CM: 378.54  1/3/2013 - Present Yes Overview Signed 1/3/2013  9:03 AM by Natasha Infante MD  
  Spring 2012- Dr. Laura Johnson Macular degeneration (Chronic) ICD-10-CM: H35.30 ICD-9-CM: 362.50  1/3/2013 - Present Unknown Overview Signed 1/3/2013  9:05 AM by MD Dr. Laura Barraza Glaucoma (Chronic) ICD-10-CM: H40.9 ICD-9-CM: 365.9  1/3/2013 - Present Yes Overview Signed 1/3/2013  9:05 AM by MD Dr. Laura Barraza Hearing loss, sensorineural (Chronic) ICD-10-CM: H90.5 ICD-9-CM: 389.10  1/3/2013 - Present Yes Senile osteoporosis (Chronic) ICD-10-CM: M81.0 ICD-9-CM: 733.01  10/31/2012 - Present Yes Overview Signed 1/3/2013  9:04 AM by MD Dr. Marcella Barraza Hyperlipidemia (Chronic) ICD-10-CM: X74.2 ICD-9-CM: 272.4  10/31/2012 - Present Yes Hypertension (Chronic) ICD-10-CM: I10 
ICD-9-CM: 401.9  10/31/2012 - Present Yes Hypothyroidism (Chronic) ICD-10-CM: E03.9 ICD-9-CM: 244.9  10/31/2012 - Present Yes PLAN:   
· Patient seen by speech and okayed for mechanical soft thin liquid diet · Continue PRN pain meds · Continue appropriate home medications · Continue supportive care · Plan is likely for placement to rehab 
 
DC planning/Dispo: Likely will need placement; will follow-up with case management and therapy recommendations. DVT ppx: SCDs Signed: 
Belén Miramontes MD

## 2020-05-03 NOTE — PROGRESS NOTES
After CT while moving patient back to her bed, the oxygen tubing caused a skin tear on left side of neck. Gauze applied until bleeding stopped. Gauze lightly placed on skin tear and RN notified so she can be aware and properly dress area. Quantros placed.

## 2020-05-03 NOTE — PROGRESS NOTES
Pt removed dressing to R arm. Skin tear bleeding. Skin tear cleaned and xeroform placed and wrapped in gauze.

## 2020-05-03 NOTE — PROGRESS NOTES
Consult request: 
 
 
Reviewed notes prior to visit for spiritual concerns Noted when staff was in room patient is extremely hard of hearing Staff suggested talking into patient left ear. She appeared anxious in her conversation with staff They were able to calm her down and moved her to 636. She is very peaceful when I attempted to see her Will follow Precautions : 
PPE was worn No physical contact with patient Social distancing observed Wan [place], staff

## 2020-05-04 NOTE — PROGRESS NOTES
Hourly rounds performed. All needs meet. Bed low/locked. Call light within reach. Patient calmer and less agitated.   Will continue to monitor and report to oncoming RN

## 2020-05-04 NOTE — PROGRESS NOTES
Problem: Mobility Impaired (Adult and Pediatric) Goal: *Acute Goals and Plan of Care (Insert Text) Description: LTG: 
(1.)Ms. Chrissie Maynard will move from supine to sit and sit to supine  in bed with MODERATE ASSIST within 7 treatment day(s). (2.)Ms. Chrissie Maynard will transfer from bed to chair and chair to bed with MODERATE ASSIST using the least restrictive device within 7 treatment day(s). (3.)Ms. Chrissie Maynard will ambulate with MODERATE ASSIST for 10 feet with the least restrictive device within 7 treatment day(s). (4)Ms. Chrissie Maynard will perform HEP with cues and assistance to increase safety on his feet in 7 days. ________________________________________________________________________________________________ Outcome: Progressing Towards Goal 
  
PHYSICAL THERAPY: Daily Note and PM 5/4/2020 INPATIENT: PT Visit Days : 2 Payor: Job Lombardo MMP / Plan: [facility] [employer] MMP / Product Type: [employer] Medicare /   
  
NAME/AGE/GENDER: Ne Pierson is a 80 y.o. female PRIMARY DIAGNOSIS: Recurrent falls [R29.6] Macular degeneration [H35.30] At risk for unsafe behavior [Z91.89] Fracture of one rib, left side, initial encounter for closed fracture [S22.32XA] Closed left scapular fracture [S42.102A] Debility [R53.81] Physical deconditioning [R53.81] Recurrent falls Recurrent falls ICD-10: Treatment Diagnosis:  
 · Generalized Muscle Weakness (M62.81) · Other abnormalities of gait and mobility (R26.89) · History of falling (Z91.81) Precaution/Allergies: 
Patient has no known allergies. ASSESSMENT:  
 
Ms. Chrissie Maynard presents with significant debility and very [place]. 
 5/4: Upon entry, pnt is supine in bed and agreeable to therapy. BP is taken at 146/79. The lap belt is removed and she performs supine to sit with mod-max assist x2. Scooting to EOB with mod assist. She performs seated marching x10 reps with poor dynamic trunk control.  STS w/ mod assist x2, followed by stand to sit with mod assist x2. Pnt occasionally talks to people who are not in the room, but can answer questions asked very loudly by the therapist. Sit to supine performed with mod-max assist x2. Lap belt was reapplied and nursing notified (who then came into the room) as patient was attempting to leave her bed, insisting that she needed to go. At the end of the session, pnt was supine in bed under the lap belt with nursing present. She is currently functioning below her baseline and will benefit from continued acute care therapy at this time. This section established at most recent assessment PROBLEM LIST (Impairments causing functional limitations): 1. Decreased Strength 2. Decreased ADL/Functional Activities 3. Decreased Transfer Abilities 4. Decreased Ambulation Ability/Technique 5. Decreased Balance 6. Increased Pain 7. Decreased Activity Tolerance 8. Increased Fatigue 9. Decreased Flexibility/Joint Mobility 10. Decreased Knowledge of Precautions 11. Decreased [place] with Home Exercise Program 
 INTERVENTIONS PLANNED: (Benefits and precautions of physical therapy have been discussed with the patient.) 1. Balance Exercise 2. Bed Mobility 3. Gait Training 4. Home Exercise Program (HEP) 5. Range of Motion (ROM) 6. Therapeutic Activites 7. Therapeutic Exercise/Strengthening 8. Transfer Training TREATMENT PLAN: Frequency/Duration: daily for duration of hospital stay Rehabilitation Potential For Stated Goals: Fair REHAB RECOMMENDATIONS (at time of discharge pending progress):   
Placement: 
snf 
Equipment:  
? None at this time HISTORY:  
History of Present Injury/Illness (Reason for Referral): 
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 Pt lives by herself, she was found down by her neighbor today around 1300 hrs. As per pt, she fell around 1000 hrs this AM. Pt reports recurrent falls, lives by herself. She reports of multiple bruises all over her body, and she reportedly hit her head today. She c/o mild to moderate pain diffusely throughout her body joints. She denies fever, nausea/vomiting, chest pain, headache, weakness/numbness/tingling in extremities, diarrhea, urinary symptoms. ER physician attempted to make pt walk, but she is not able to stand up, needed moderate 2 ppl assistance and was noted to be unsteady. ER work up showed an unremarkable CT head, suspected left scapular tip fracture with left 6th rib fracture. Given pt is at high risk of falls and is unsafe being alone at home, and pain management, pt will admitted for PT/OT eval and placement. Past Medical History/Comorbidities: Ms. 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), Constipation, Fatigue (10/31/2012), GERD (gastroesophageal reflux disease), Glaucoma (1/3/2013), Hearing loss, sensorineural (1/3/2013), History of subdural hematoma (post traumatic), Hypercholesterolemia, Hyperlipemia (10/31/2012), Hyperlipidemia (10/31/2012), Hypertension (10/31/2012), Macular degeneration (1/3/2013), Mitral valve disorders(424.0) (10/31/2012), Osteoarthrosis, unspecified whether generalized or localized, lower leg (3/13/2013), Other malaise and fatigue (10/31/2012), Senile osteoporosis (10/31/2012), Skin cancer of scalp (2012), TIA (transient ischemic attack), and Unspecified hypothyroidism (10/31/2012). Ms. Lucretia Friend  has a past surgical history that includes pr rectal sensation test, balloon; hx appendectomy; hx colonoscopy; and hx malignant skin lesion excision. Social History/Living Environment:  
Home Environment: Private residence Current DME Used/Available at Home: ricardo [employer] Prior Level of Function/Work/Activity: 
Falling, rollator, lives alone Number of Personal Factors/Comorbidities that affect the Plan of Care: 3+: HIGH COMPLEXITY EXAMINATION:  
Most Recent Physical Functioning:  
Gross Assessment: 
  
         
  
Posture: 
  
Balance: 
Sitting: Impaired Sitting - Static: Fair (occasional) Sitting - Dynamic: Fair (occasional) Standing: Impaired Standing - Static: Poor Bed Mobility: 
Supine to Sit: Moderate assistance;Maximum assistance;Assist x2 Sit to Supine: Moderate assistance;Maximum assistance;Assist x2 Scooting: Moderate assistance Duration: 25 Minutes Wheelchair Mobility: 
  
Transfers: 
Sit to Stand: Moderate assistance;Assist x2 Stand to Sit: Moderate assistance;Assist x2 Gait:unable Body Structures Involved: 1. Bones 2. Joints 3. Muscles 4. Ligaments Body Functions Affected: 1. Movement Related Activities and Participation Affected: 1. Mobility Number of elements that affect the Plan of Care: 3: MODERATE COMPLEXITY CLINICAL PRESENTATION:  
Presentation: Stable and uncomplicated: LOW COMPLEXITY CLINICAL DECISION MAKING:  
[employer] AM-PAC 6 Clicks Basic Mobility Inpatient Short Form How much difficulty does the patient currently have. .. Unable A Lot A Little None 1. Turning over in bed (including adjusting bedclothes, sheets and blankets)? [] 1   [x] 2   [] 3   [] 4  
2. Sitting down on and standing up from a chair with arms ( e.g., wheelchair, bedside commode, etc.)   [] 1   [x] 2   [] 3   [] 4  
3. Moving from lying on back to sitting on the side of the bed? [] 1   [x] 2   [] 3   [] 4 How much help from another person does the patient currently need. .. Total A Lot A Little None 4. Moving to and from a bed to a chair (including a wheelchair)? [] 1   [x] 2   [] 3   [] 4  
5. Need to walk in hospital room? [x] 1   [] 2   [] 3   [] 4  
6. Climbing 3-5 steps with a railing? [x] 1   [] 2   [] 3   [] 4  
© 2007, Trustees of [facility] [employer], under license to [employer].  All rights reserved Score:  Initial: 10 Most Recent: X (Date: -- ) Interpretation of Tool:  Represents activities that are increasingly more difficult (i.e. Bed mobility, Transfers, Gait). Medical Necessity:    
· Patient is expected to demonstrate progress in  
· strength, range of motion, and balance ·  to  
· decrease assistance required with exercises and functional mobility · . Reason for Services/Other Comments: 
· Patient · continues to require present interventions due to patient's inability to perform exercises and functional mobility independently · . Use of outcome tool(s) and clinical judgement create a POC that gives a: Questionable prediction of patient's progress: MODERATE COMPLEXITY  
  
 
 
 
TREATMENT:  
(In addition to Assessment/Re-Assessment sessions the following treatments were rendered) Pre-treatment Symptoms/Complaints:  \"I have to leave\" Pain: Initial:  
   Post Session:  unable to rate  
assessment Therapeutic Activity: (25 Minutes   ):  Therapeutic activities including Bed transfers, standing, seated marches,  and standing, rolling, sitting balance to improve mobility, strength, and balance. Required maximal to moderate cues   to promote static balance in standing. Braces/Orthotics/Lines/Etc:  
· IV 
· O2 Device: Nasal cannula Treatment/Session Assessment:   
· Response to Treatment:  debility · Interdisciplinary Collaboration:  
o Physical Therapist 
o Registered Nurse 
o Rehabilitation Attendant 
o Certified Nursing Assistant/Patient Care Technician · After treatment position/precautions:  
o Supine in bed 
o Bed/Chair-wheels locked 
o Bed in low position 
o Call light within reach 
o Nurse at bedside 
o lapbelt applied · Compliance with Program/Exercises: Will assess as treatment progresses · Recommendations/Intent for next treatment session: \"Next visit will focus on reduction in assistance provided\". Total Treatment Duration: PT Patient Time In/Time Out Time In: 8745 Time Out: 4136 Hong Harris

## 2020-05-04 NOTE — PROGRESS NOTES
CM was notified by RN Sabiha Escobar, patient's POA is Paulina Ayers, who requested to speak with CM. CM received consult to assist with discharge planning. CM attempted to see patient, however, it appeared the patient had difficulty hearing. CM contacted patient's Galdino Lawson [phone], to complete assessment. 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 Demographic information confirmed by the patient. The patient lives alone in a one level home, with 1 step at the entrance. Per Vonna Gosselin, patient's two neighbors and friend assist the patient with supportive care needs. Patient is able to complete ADL's. According to Vonna Gosselin, patient is able to complete sponge baths. CM was receptive to this information. The patient receives her medications from [facility] [address] in [place]. Vonna Gosselin confirmed the patient had [facility] services with [facility] in the past. PT/OT/speech have been consulted. PPD has been placed. Per PT note, the patient has been recommended for STR. CM informed Vonna Gosselin of this information. Vonna Gosselin requested CM send referral to [facility]. CM sent referral this day. No additional needs voiced at this time. CM continues to follow the patient to assist with discharge planning. I have confirmed that the patient does not have the capacity at home to be able to do a virtual visit with their PCP. Care Management Interventions PCP Verified by CM: Yes Mode of Transport at Discharge: BLS Transition of Care Consult (CM Consult): Discharge Planning Discharge Durable Medical Equipment: No(Rolling walker, elevated toliet seat. ) Physical Therapy Consult: Yes Occupational Therapy Consult: Yes Speech Therapy Consult: Yes Current Support Network: Lives Alone, Own Home(The patient lives alone in a one level home.) Confirm Follow Up Transport: Other (see comment)(Patient's neighbor provides transportation to [facility]. Patient no longer drives. ) The Plan for Transition of Care is Related to the Following Treatment Goals : Patient to obtain care to become medically stable and to discharge to a SNF to improve physical mobility. The Patient and/or Patient Representative was Provided with a Choice of Provider and Agrees with the Discharge Plan?: Yes Name of the Patient Representative Who was Provided with a Choice of Provider and Agrees with the Discharge Plan: Kirill Kyle [place] of Choice List was Provided with Basic Dialogue that Supports the Patient's Individualized Plan of Care/Goals, Treatment Preferences and Shares the Quality Data Associated with the Providers?: Yes [employer] Information Provided?: No 
Discharge Location Discharge Placement: Skilled nursing facility

## 2020-05-04 NOTE — PROGRESS NOTES
Hospitalist Progress Note Admit Date:  2020 12:37 PM  
Name:  Hermelinda New Age:  80 y.o. 
:  1923 MRN:  [id number] PCP:  Luz Elena Cullen MD 
Treatment Team: Attending Provider: Reese Johnson MD; Primary Nurse: Maxim Mccann, JENNIFER; Charge Nurse: Yue Lombardo; Utilization Review: Kemar Lainez; Care Manager: Ayla Rothman Subjective: May 3, 2020 patient seen and evaluated. Yelling out for staff. Does not want to be left alone in the room. Notes some belly pain after eating. Has gotten pain medicine about 30 minutes ago May 4, 2020patient seen and evaluated; more calm today and sedated; a little bit confused tells me that her oxygen tube is for nausea medicine. Objective:  
 
Patient Vitals for the past 24 hrs: 
 Temp Pulse Resp BP SpO2  
20 1630    152/84   
20 1500    146/79   
20 1454 97.8 °F (36.6 °C) 83 18 (!) 160/99 94 % 20 1050 98 °F (36.7 °C) (!) 114 20 (!) 152/95 96 % 20 0810     91 % 20 0657 97.8 °F (36.6 °C) 78 17 140/83 92 % 20 0400 97.6 °F (36.4 °C) 77 16 147/86 93 % 20 0000 97.3 °F (36.3 °C) 85 16 (!) 139/94 91 % 20 1900 98.3 °F (36.8 °C) 77 16 124/72 95 % Oxygen Therapy O2 Sat (%): 94 % (20 1454) Pulse via Oximetry: 94 beats per minute (20 0810) O2 Device: Nasal cannula (20 0810) O2 Flow Rate (L/min): 2 l/min (20 0810) Intake/Output Summary (Last 24 hours) at 2020 1722 Last data filed at [address] [place] [address] Gross per 24 hour Intake 160 ml Output 400 ml Net -240 ml General:    Frail elderly female in no obvious cardiopulmonary distress, nasal cannula out of her nose; bruising in the upper chest wall; alert. CV:   RRR. No murmur, rub, or gallop. Lungs:   CTAB. No wheezing, rhonchi, or rales. Abdomen:   Soft, nontender, nondistended. Extremities: Warm and dry. No cyanosis or edema. Patient presents with:  Cough/URI      HPI:     Arlinda Snellen is a 54year old female with a past history of asthma who is a daily smoker presents with a chief complaint of cough, congestion, sore throat over the course of last 2 weeks.   Has also e Skin:     No rashes or jaundice. Data Review: 
I have reviewed all labs, meds, telemetry events, and studies from the last 24 hours. Recent Results (from the past 24 hour(s)) PLEASE READ & DOCUMENT PPD TEST IN 48 HRS Collection Time: 05/03/20  9:26 PM  
Result Value Ref Range PPD Negative Negative  
 mm Induration 0 0 - 5 mm METABOLIC PANEL, COMPREHENSIVE Collection Time: 05/04/20  6:13 AM  
Result Value Ref Range Sodium 139 136 - 145 mmol/L Potassium 3.8 3.5 - 5.1 mmol/L Chloride 106 98 - 107 mmol/L  
 CO2 27 21 - 32 mmol/L Anion gap 6 (L) 7 - 16 mmol/L Glucose 118 (H) 65 - 100 mg/dL BUN 27 (H) 8 - 23 MG/DL Creatinine 0.67 0.6 - 1.0 MG/DL  
 GFR est AA >60 >60 ml/min/1.73m2 GFR est non-AA >60 >60 ml/min/1.73m2 Calcium 8.3 8.3 - 10.4 MG/DL Bilirubin, total 1.0 0.2 - 1.1 MG/DL  
 ALT (SGPT) 25 12 - 65 U/L  
 AST (SGOT) 41 (H) 15 - 37 U/L Alk. phosphatase 64 50 - 136 U/L Protein, total 5.9 (L) 6.3 - 8.2 g/dL Albumin 2.6 (L) 3.2 - 4.6 g/dL Globulin 3.3 2.3 - 3.5 g/dL A-G Ratio 0.8 (L) 1.2 - 3.5    
CBC W/O DIFF Collection Time: 05/04/20  6:13 AM  
Result Value Ref Range WBC 6.7 4.3 - 11.1 K/uL  
 RBC 2.82 (L) 4.05 - 5.2 M/uL  
 HGB 10.0 (L) 11.7 - 15.4 g/dL HCT 30.5 (L) 35.8 - 46.3 % .2 (H) 79.6 - 97.8 FL  
 MCH 35.5 (H) 26.1 - 32.9 PG  
 MCHC 32.8 31.4 - 35.0 g/dL  
 RDW 14.1 11.9 - 14.6 % PLATELET 378 511 - 079 K/uL MPV 12.4 (H) 9.4 - 12.3 FL ABSOLUTE NRBC 0.00 0.0 - 0.2 K/uL All Micro Results None Current Meds: 
Current Facility-Administered Medications Medication Dose Route Frequency  metoprolol (LOPRESSOR) injection 5 mg  5 mg IntraVENous Q6H PRN  
 divalproex (DEPAKOTE SPRINKLE) capsule 125 mg  125 mg Oral TID  
 HYDROmorphone (PF) (DILAUDID) injection 0.5 mg  0.5 mg IntraVENous Q6H PRN  
 ibuprofen (MOTRIN) tablet 400 mg  400 mg Oral Q6H PRN  
 recommended she go to the ER for further work-up. She would like to hold off at this time. Patient states she is currently homeless and living in her car.   Is requesting a note for the next few days to state that she needs bed rest so that she can get in with:  Álvaro Anderson, DO  [address] [facility] [address] [place] [address]  famotidine (PEPCID) tablet 20 mg  20 mg Oral DAILY  aspirin chewable tablet 81 mg  81 mg Oral DAILY  brimonidine (ALPHAGAN) 0.2 % ophthalmic solution 1 Drop  1 Drop Right Eye Q8H  
 calcium-vitamin D (OS-JERICHO) 500 mg-200 unit tablet  1 Tab Oral BID  dorzolamide-timoloL (COSOPT) 22.3-6.8 mg/mL ophthalmic solution 1 Drop  1 Drop Both Eyes BID  pantoprazole (PROTONIX) tablet 40 mg  40 mg Oral ACB  fluticasone propionate (FLONASE) 50 mcg/actuation nasal spray 2 Spray  2 Spray Both Nostrils DAILY  levothyroxine (SYNTHROID) tablet 50 mcg  50 mcg Oral ACB  lubiPROStone (AMITIZA) capsule 8 mcg (Patient Supplied)  8 mcg Oral DAILY  simethicone (MYLICON) tablet 104 mg  120 mg Oral Q6H PRN  
 atorvastatin (LIPITOR) tablet 20 mg  20 mg Oral QHS  sodium chloride (NS) flush 5-40 mL  5-40 mL IntraVENous Q8H  
 sodium chloride (NS) flush 5-40 mL  5-40 mL IntraVENous PRN  
 acetaminophen (TYLENOL) tablet 650 mg  650 mg Oral Q4H PRN  
 naloxone (NARCAN) injection 0.4 mg  0.4 mg IntraVENous PRN  
 ondansetron (ZOFRAN) injection 4 mg  4 mg IntraVENous Q4H PRN Other Studies (last 24 hours): 
Ct Chest W Cont Result Date: 5/3/2020 CT THORAX WITH CONTRAST HISTORY: hypoxic resp failure; syncope; left scapular and rib fracture, 89 years Female From xray: 1. Suspected scapular tip fracture with questionable additional injury involving the left posterior lateral sixth rib. Further evaluation with a chest CT should be considered COMPARISON: Left shoulder radiographs 2 days ago TECHNIQUE: 70 cc of nonionic contrast injected, and axial helical images from the thoracic inlet through diaphragm were obtained. Coronal reformatted images were obtained at the scanner console and made available for review.   All CT scans at this location are performed using dose modulation techniques as appropriate to a performed exam including the following: automated exposure control; adjustment of the mA and/or kV according to patient's size (this includes techniques or standardized protocols for targeted exams where dose is matched to indication/reason for exam; i.e. extremities or head); use of iterative reconstruction technique. FINDINGS: Partially visualized thyroid unremarkable. No evidence of significant mediastinal, hilar, or axillary lymphadenopathy. Tortuous thoracic aorta. Mild calcific atherosclerosis of a normal caliber aortic arch and descending aorta. No evidence of proximal pulmonary embolus. Mild dependent subsegmental atelectasis bilateral lung bases. No evidence of pleural effusion or air space consolidation. Visualized proximal airways unremarkable. Small simple appearing right renal cortical cysts. Visualized upper abdominal viscera including the adrenal glands are otherwise unremarkable. As visualized on the recent radiographs, there does appear to be an acute mildly displaced and overlapped fracture of the inferior tip of the left scapula which appears somewhat comminuted. No definite acute rib injury is seen. Visualized osseous structures otherwise unremarkable. IMPRESSION: 1. Findings most likely represent an acute mildly displaced and overlapped comminuted fracture of the inferior tip of the left scapula. No evidence of acute rib injury. 2.  Other incidental findings as above. Assessment and Plan:  
 
Hospital Problems as of 5/4/2020 Date Reviewed: 3/13/2019 Codes Class Noted - Resolved POA Debility ICD-10-CM: R53.81 ICD-9-CM: 799.3  5/1/2020 - Present Unknown Physical deconditioning ICD-10-CM: R53.81 ICD-9-CM: 799.3  5/1/2020 - Present Unknown Closed left scapular fracture ICD-10-CM: V11.377F ICD-9-CM: 811.00  5/1/2020 - Present Unknown * (Principal) Recurrent falls ICD-10-CM: R29.6 ICD-9-CM: V15.88  5/1/2020 - Present Unknown Fracture of one rib, left side, initial encounter for closed fracture ICD-10-CM: S22.32XA ICD-9-CM: 807.01  5/1/2020 - Present Unknown At risk for unsafe behavior ICD-10-CM: Z91.89 ICD-9-CM: V15.89  5/1/2020 - Present Unknown Wound of right leg ICD-10-CM: E79.466T ICD-9-CM: 891.0  1/22/2019 - Present Yes CN VI palsy (Chronic) ICD-10-CM: H49.20 ICD-9-CM: 378.54  1/3/2013 - Present Yes Overview Signed 1/3/2013  9:03 AM by Titus Woodard MD  
  Spring 2012- Dr. Modesta Brown Macular degeneration (Chronic) ICD-10-CM: H35.30 ICD-9-CM: 362.50  1/3/2013 - Present Unknown Overview Signed 1/3/2013  9:05 AM by MD Dr. Modesta Blair Glaucoma (Chronic) ICD-10-CM: H40.9 ICD-9-CM: 365.9  1/3/2013 - Present Yes Overview Signed 1/3/2013  9:05 AM by MD Dr. Modesta Blair Hearing loss, sensorineural (Chronic) ICD-10-CM: H90.5 ICD-9-CM: 389.10  1/3/2013 - Present Yes Senile osteoporosis (Chronic) ICD-10-CM: M81.0 ICD-9-CM: 733.01  10/31/2012 - Present Yes Overview Signed 1/3/2013  9:04 AM by MD Dr. Howard Blair Hyperlipidemia (Chronic) ICD-10-CM: E84.2 ICD-9-CM: 272.4  10/31/2012 - Present Yes Hypertension (Chronic) ICD-10-CM: I10 
ICD-9-CM: 401.9  10/31/2012 - Present Yes Hypothyroidism (Chronic) ICD-10-CM: E03.9 ICD-9-CM: 244.9  10/31/2012 - Present Yes PLAN:   
· Patient seen by speech and okayed for mechanical soft thin liquid diet · CT chest was done yesterday secondary to the patient's persistent pain and agitation; it showed a comminuted scapular tip fracture but no rib fractures · Right scapular fracture will continue PRN pain meds; I suspect the narcotics are confusing her somewhat and will switch her to Motrin with PRN narcotics as needed · Elevated blood pressuremay be secondary to pain will place on PRN meds as well as a blood pressure medications · Continue appropriate home medications · Continue supportive care · Plan is  for placement to rehab; Case management is aware and is working on this DC planning/Dispo:  rehab placement;  
DVT ppx: SCDs Signed: 
Sita Cho MD

## 2020-05-04 NOTE — PROGRESS NOTES
Patient very agitated and confused, talking to someone \"in her room\" and calling out for help, pulling at grossman and oxygen tubing. Placed patient in soft mitts. Dr. Richie Smith aware

## 2020-05-04 NOTE — PROGRESS NOTES
Patient refused PO meds: pepcid 20 mg and Norvasc 5 mg. These were crushed and mixed in applesauce, which has helped patient swallow PO meds. But patient spit back out spoonful given.

## 2020-05-04 NOTE — PROGRESS NOTES
Problem: Falls - Risk of 
Goal: *Absence of Falls Description: Document Starleen Freeze Fall Risk and appropriate interventions in the flowsheet. Outcome: Progressing Towards Goal 
Note: Fall Risk Interventions: 
Mobility Interventions: Patient to call before getting OOB Mentation Interventions: Bed/chair exit alarm, Door open when patient unattended, Reorient patient, More frequent rounding, Increase mobility Medication Interventions: Patient to call before getting OOB, Evaluate medications/consider consulting pharmacy Elimination Interventions: Call light in reach History of Falls Interventions: Investigate reason for fall, Evaluate medications/consider consulting pharmacy, Door open when patient unattended, Consult care management for discharge planning Problem: Pressure Injury - Risk of 
Goal: *Prevention of pressure injury Description: Document Dontae Scale and appropriate interventions in the flowsheet. Outcome: Progressing Towards Goal 
Note: Pressure Injury Interventions: 
Sensory Interventions: Minimize linen layers, Maintain/enhance activity level Moisture Interventions: Absorbent underpads Activity Interventions: PT/OT evaluation Mobility Interventions: PT/OT evaluation Nutrition Interventions: Document food/fluid/supplement intake Friction and Shear Interventions: Minimize layers, Lift sheet Problem: Patient Education: Go to Patient Education Activity Goal: Patient/Family Education Outcome: Progressing Towards Goal

## 2020-05-04 NOTE — PROGRESS NOTES
Patient's Antione NEVILLE ([phone]) called for update on patient, with patient's code. Asked to speak with Case Management. Contact info given to [facility] with NIMO

## 2020-05-04 NOTE — PROGRESS NOTES
Patient was mumbling  provided calming presence Spoke into her left ear so she could hear me She smiled as I comforted her She was quiet when I left Will follow closely today Precautions: PPE worn on all visits No contact with patient or family by touch Social distancing observed Shefali Montiel, staff Melisa morrow 12, [facility]  /   [email or web address]

## 2020-05-04 NOTE — PROGRESS NOTES
Pt still confused and sometimes crying. Hourly round completed throughout the shift. No complain of pain and denies any needs at this time Bed in lower position and call light/ personal items within reach. Will continue to monitor and give bed side shift report to on coming day shift nurse. Date 05/03/20 0700 - 05/04/20 7608 05/04/20 0700 - 05/05/20 5506 Shift [id number] 1900-0659 24 Hour Total 4536-2027 5221-6514 24 Hour Total  
INTAKE Shift Total(mL/kg) OUTPUT Urine(mL/kg/hr) 200(0.3)  200 Urine Output (mL) (Urinary Catheter 05/02/20 [place]; 2- way) 200  200 Shift Total(mL/kg) 200(3.6)  200(3.7) NET -200  -200 Weight (kg) 55.8 54.3 54.3 54.3 54.3 54.3

## 2020-05-05 NOTE — PROGRESS NOTES
PT in to work with pt voicing concerns of changes since yesterday, questioning stroke possibility, informed PT pt received Haldol this am. Dr. Anita Mortensen notified of PT's concerns. Dr. Anita Mortensen to room to assess pt. Likely Haldol causing changes, Dr. Anita Mortensen ordering CT head. Will continue to monitor.

## 2020-05-05 NOTE — PROGRESS NOTES
Patient has been accepted to [facility] for [facility]. SUZIE has notified MD of this information. Patient can discharge to SNF when medically stable. CM continues to follow the patient. CM informed the patient's HPOA Isrrael Abbasi, patient will discharge to SNF when medically stable. Isrrael Abbasi also asked if the patient can obtain a aide of the family's choice with patient's Medicaid Insurance plan, to assist with care needs in the home. CM contacted [address], to determine what information is needed to assist with this process. Baljit informed CM, the patient's family would need to speak with the patient's Auth rep, which can be identified , by locating the patient's [phone] form. Isrrael Abbasi stated he will attempt to locate this information. However, CM informed Isrrael Abbasi, If additional assistance is needed, patient/family can contact number provided on the back of the patient's Medicaid insurance card. Isrrael Abbasi was receptive to this information. SUZIE continues to follow the patient.

## 2020-05-05 NOTE — PROGRESS NOTES
OT Note:  Patient currently with PT. Currently lethargic and received Haldol per RN. Plan to hold off on OT treatment at this time.

## 2020-05-05 NOTE — PROGRESS NOTES
Pt yelling, \"I'm falling, I'm falling. \" Pt in bed with 3 side rails up. Attempt to reassure pt she is not falling, pt continues to yell out and holding on to upper bed rail. Haldol 3mg IM adm L deltoid.

## 2020-05-05 NOTE — PROGRESS NOTES
CM received a call from [facility] rep. [facility] approved 7 days of rehab at this time and can discharge to [facility] when medically stable.

## 2020-05-05 NOTE — PROGRESS NOTES
Hospitalist Progress Note Admit Date:  2020 12:37 PM  
Name:  Shira Medina Age:  80 y.o. 
:  1923 MRN:  [id number] PCP:  Omer Kapadia MD 
Treatment Team: Attending Provider: Isaiah Bradford MD; Charge Nurse: Luz Marina Benavides; Utilization Review: Patsy Shrestha; Care Manager: Isidro Pa; Primary Nurse: Mirna De La Torre, RN; Primary Nurse: Johnathan Espinosa RN; Physical Therapist: Bhavin Martin Subjective: May 3, 2020 patient seen and evaluated. Yelling out for staff. Does not want to be left alone in the room. Notes some belly pain after eating. Has gotten pain medicine about 30 minutes ago May 4, 2020patient seen and evaluated; more calm today and sedated; a little bit confused tells me that her oxygen tube is for nausea medicine. May 5th 2020- pt seen- agitated and confused. States she does not want to fall again. .. wait. Let me get my bearings. Her eyes are closed and she will not open them; she is trying to get the lap belt off Objective:  
 
Patient Vitals for the past 24 hrs: 
 Temp Pulse Resp BP SpO2  
20 0629 99.6 °F (37.6 °C) 96 18 143/75 94 % 20 0502 99.7 °F (37.6 °C) (!) 104 20 154/86 97 % 20 0109 98.1 °F (36.7 °C) 94 20 140/87 97 % 20 0015 97.4 °F (36.3 °C) (!) 111 20 (!) 160/107 96 % 20 1924 97.8 °F (36.6 °C) (!) 102 18 181/74 96 % 20 1630    152/84   
20 1500    146/79   
20 1454 97.8 °F (36.6 °C) 83 18 (!) 160/99 94 % 20 1050 98 °F (36.7 °C) (!) 114 20 (!) 152/95 96 % 20 0810     91 % Oxygen Therapy O2 Sat (%): 94 % (20 0629) Pulse via Oximetry: 94 beats per minute (20) O2 Device: Nasal cannula (20) O2 Flow Rate (L/min): 2 l/min (20) Intake/Output Summary (Last 24 hours) at 2020 0805 Last data filed at 2020 7799 Gross per 24 hour Intake 160 ml Output 675 ml Net -515 ml  
   
 
 General:    Frail elderly female in no obvious cardiopulmonary distress, nasal cannula out of her nose; bruising in the upper chest wall; alert. CV:   RRR. No murmur, rub, or gallop. Lungs:   CTAB. No wheezing, rhonchi, or rales. Abdomen:   Soft, nontender, nondistended. Extremities: Warm and dry. No cyanosis or edema. Skin:     No rashes or jaundice. Data Review: 
I have reviewed all labs, meds, telemetry events, and studies from the last 24 hours. No results found for this or any previous visit (from the past 24 hour(s)). All Micro Results None Current Meds: 
Current Facility-Administered Medications Medication Dose Route Frequency  haloperidol lactate (HALDOL) injection 3 mg  3 mg IntraMUSCular Q6H PRN  
 metoprolol (LOPRESSOR) injection 5 mg  5 mg IntraVENous Q6H PRN  
 divalproex (DEPAKOTE SPRINKLE) capsule 125 mg  125 mg Oral TID  ibuprofen (MOTRIN) tablet 400 mg  400 mg Oral Q6H PRN  
 famotidine (PEPCID) tablet 20 mg  20 mg Oral DAILY  amLODIPine (NORVASC) tablet 5 mg  5 mg Oral DAILY  aspirin chewable tablet 81 mg  81 mg Oral DAILY  brimonidine (ALPHAGAN) 0.2 % ophthalmic solution 1 Drop  1 Drop Right Eye Q8H  
 calcium-vitamin D (OS-JERICHO) 500 mg-200 unit tablet  1 Tab Oral BID  dorzolamide-timoloL (COSOPT) 22.3-6.8 mg/mL ophthalmic solution 1 Drop  1 Drop Both Eyes BID  pantoprazole (PROTONIX) tablet 40 mg  40 mg Oral ACB  fluticasone propionate (FLONASE) 50 mcg/actuation nasal spray 2 Spray  2 Spray Both Nostrils DAILY  levothyroxine (SYNTHROID) tablet 50 mcg  50 mcg Oral ACB  lubiPROStone (AMITIZA) capsule 8 mcg (Patient Supplied)  8 mcg Oral DAILY  simethicone (MYLICON) tablet 440 mg  120 mg Oral Q6H PRN  
 atorvastatin (LIPITOR) tablet 20 mg  20 mg Oral QHS  sodium chloride (NS) flush 5-40 mL  5-40 mL IntraVENous Q8H  
 sodium chloride (NS) flush 5-40 mL  5-40 mL IntraVENous PRN  
  acetaminophen (TYLENOL) tablet 650 mg  650 mg Oral Q4H PRN  
 naloxone (NARCAN) injection 0.4 mg  0.4 mg IntraVENous PRN  
 ondansetron (ZOFRAN) injection 4 mg  4 mg IntraVENous Q4H PRN Other Studies (last 24 hours): No results found. Assessment and Plan:  
 
Hospital Problems as of 5/5/2020 Date Reviewed: 3/13/2019 Codes Class Noted - Resolved POA Debility ICD-10-CM: R53.81 ICD-9-CM: 799.3  5/1/2020 - Present Unknown Physical deconditioning ICD-10-CM: R53.81 ICD-9-CM: 799.3  5/1/2020 - Present Unknown Closed left scapular fracture ICD-10-CM: E06.327I ICD-9-CM: 811.00  5/1/2020 - Present Unknown * (Principal) Recurrent falls ICD-10-CM: R29.6 ICD-9-CM: V15.88  5/1/2020 - Present Unknown Fracture of one rib, left side, initial encounter for closed fracture ICD-10-CM: S22.32XA ICD-9-CM: 807.01  5/1/2020 - Present Unknown At risk for unsafe behavior ICD-10-CM: Z91.89 ICD-9-CM: V15.89  5/1/2020 - Present Unknown Wound of right leg ICD-10-CM: L69.237D ICD-9-CM: 891.0  1/22/2019 - Present Yes CN VI palsy (Chronic) ICD-10-CM: H49.20 ICD-9-CM: 378.54  1/3/2013 - Present Yes Overview Signed 1/3/2013  9:03 AM by Laly Sigala MD  
  Spring 2012- Dr. Phuc Corrigan Macular degeneration (Chronic) ICD-10-CM: H35.30 ICD-9-CM: 362.50  1/3/2013 - Present Unknown Overview Signed 1/3/2013  9:05 AM by MD Dr. Phuc cMcollum Glaucoma (Chronic) ICD-10-CM: H40.9 ICD-9-CM: 365.9  1/3/2013 - Present Yes Overview Signed 1/3/2013  9:05 AM by MD Dr. Phuc Mccollum Hearing loss, sensorineural (Chronic) ICD-10-CM: H90.5 ICD-9-CM: 389.10  1/3/2013 - Present Yes Senile osteoporosis (Chronic) ICD-10-CM: M81.0 ICD-9-CM: 733.01  10/31/2012 - Present Yes Overview Signed 1/3/2013  9:04 AM by MD Dr. Abhijit Mccollum Hyperlipidemia (Chronic) ICD-10-CM: W86.1 ICD-9-CM: 272.4  10/31/2012 - Present Yes Hypertension (Chronic) ICD-10-CM: I10 
ICD-9-CM: 401.9  10/31/2012 - Present Yes Hypothyroidism (Chronic) ICD-10-CM: E03.9 ICD-9-CM: 244.9  10/31/2012 - Present Yes PLAN:   
· Patient seen by speech and okayed for mechanical soft thin liquid diet · CT chest was done and  it showed a comminuted scapular tip fracture but no rib fractures · Right scapular fracture will continue PRN pain meds; I suspect the narcotics are confusing her somewhat; so will continue motrin and tylenol for pain and stop narcotics · Elevated blood pressuremay be secondary to pain will place on PRN meds as well as a blood pressure medications · Encephalopathy- multifactorial from meds and new surroundings and dementia- supportive care; prn haldol, depakote sprinkles · Continue appropriate home medications · Continue supportive care · Plan is  for placement to rehab; Case management is aware and is working on this DC planning/Dispo:  rehab placement when stable DVT ppx: SCDs Signed: 
Mary Beth Calderon MD

## 2020-05-05 NOTE — PROGRESS NOTES
PT Note Attempted to initiate PT treatment this AM at 1120, but discovered that patient had difficulty opening R eye and there was noticeable R mouth drooping. RN was notified who came into room to assess patient. RN then contacted physician regarding patient status when PT left room with patient supine and nursing present.   
 
Yoana Vasquez, PT

## 2020-05-05 NOTE — PROGRESS NOTES
Hourly rounds completed this shift, will give report to oncoming nurse. Pt resting in bed.  Will inform oncoming nurse of bladder training and order to d/c grossman catheter in am.

## 2020-05-06 NOTE — PROGRESS NOTES
Pt has been approved by insurance for admission to [facility] today, per MD pt is not medically ready for dc. [place] admissions notified.

## 2020-05-06 NOTE — PROGRESS NOTES
1. Complete grooming/oral care with standby assistance. 2. Complete upper body bathing and dressing with standby assistance. 3. Complete 20-25 minute therapeutic activity/ADL with 3 or less rest breaks. 4. Complete toileting with minimal assistance. Time frame: 4- 7 visits. OCCUPATIONAL THERAPY: Daily Note 5/6/2020 INPATIENT: OT Visit Days: 2 Payor: Ofelia Hough MMP / Plan: [place] [employer] MMP / Product Type: Managed Care Medicare /  
  
NAME/AGE/GENDER: Colin Toledo is a 80 y.o. female PRIMARY DIAGNOSIS:  Recurrent falls [R29.6] Macular degeneration [H35.30] At risk for unsafe behavior [Z91.89] Fracture of one rib, left side, initial encounter for closed fracture [S22.32XA] Closed left scapular fracture [S42.102A] Debility [R53.81] Physical deconditioning [R53.81] Recurrent falls Recurrent falls ICD-10: Treatment Diagnosis:  
 · Generalized Muscle Weakness (M62.81) · History of falling (Z91.81) Precautions/Allergies: 
   Patient has no known allergies. ASSESSMENT:  
 
Ms. Adilene Fenton presented supine in bed - still with lap belt (restraints on). Ms. Adilene Fenton completed bed mobility with moderate to maximal assistance and stood with moderate to maximal assistance of 2 people. She does not follow simple commands, but completed activities that were more automatic (I.e. combing her hair/standing/etc.). Continue OT per plan of care. This section established at most recent assessment PROBLEM LIST (Impairments causing functional limitations): 1. Decreased Strength 2. Decreased ADL/Functional Activities 3. Decreased Transfer Abilities 4. Decreased Balance 5. Increased Pain 6. Decreased Activity Tolerance 7. Decreased Flexibility/Joint Mobility 8. Decreased [place] with Home Exercise Program 
 INTERVENTIONS PLANNED: (Benefits and precautions of occupational therapy have been discussed with the patient.) 1. Activities of daily living training 2. Therapeutic activity 3. Therapeutic exercise TREATMENT PLAN: Frequency/Duration: Follow patient 3 times/week to address above goals. Rehabilitation Potential For Stated Goals: Good REHAB RECOMMENDATIONS (at time of discharge pending progress):   
Placement: It is my opinion, based on this patient's performance to date, that Ms. Amber Rasheed may benefit from intensive therapy at a [address] [place] [address] after discharge due to the functional deficits listed above that are likely to improve with skilled rehabilitation and concerns that he/she may be unsafe to be unsupervised at home due to pain, debility and decreased overall independence with ADL. Equipment:  
? None at this time OCCUPATIONAL PROFILE AND HISTORY:  
History of Present Injury/Illness (Reason for Referral): 
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 Pt lives by herself, she was found down by her neighbor today around 1300 hrs. As per pt, she fell around 1000 hrs this AM. Pt reports recurrent falls, lives by herself. She reports of multiple bruises all over her body, and she reportedly hit her head today. She c/o mild to moderate pain diffusely throughout her body joints. She denies fever, nausea/vomiting, chest pain, headache, weakness/numbness/tingling in extremities, diarrhea, urinary symptoms. ER physician attempted to make pt walk, but she is not able to stand up, needed moderate 2 ppl assistance and was noted to be unsteady. ER work up showed an unremarkable CT head, suspected left scapular tip fracture with left 6th rib fracture. Given pt is at high risk of falls and is unsafe being alone at home, and pain management, pt will admitted for PT/OT eval and placement. Past Medical History/Comorbidities: Ms. Leonides Elizabeth  has a past medical history of CN VI palsy (1/3/2013), Constipation, Fatigue (10/31/2012), GERD (gastroesophageal reflux disease), Glaucoma (1/3/2013), Hearing loss, sensorineural (1/3/2013), History of subdural hematoma (post traumatic), Hypercholesterolemia, Hyperlipemia (10/31/2012), Hyperlipidemia (10/31/2012), Hypertension (10/31/2012), Macular degeneration (1/3/2013), Mitral valve disorders(424.0) (10/31/2012), Osteoarthrosis, unspecified whether generalized or localized, lower leg (3/13/2013), Other malaise and fatigue (10/31/2012), Senile osteoporosis (10/31/2012), Skin cancer of scalp (2012), TIA (transient ischemic attack), and Unspecified hypothyroidism (10/31/2012). Ms. Leonides Elizabeth  has a past surgical history that includes pr rectal sensation test, balloon; hx appendectomy; hx colonoscopy; and hx malignant skin lesion excision. Social History/Living Environment:  
Home Environment: Private residence Current DME Used/Available at Home: Estela Sita, rollator Prior Level of Function/Work/Activity: 
Modified independent with ADL. Neighbors help out a lot per patient. Previous Treatment Approaches: SNF Rehab in [place] per patient. Number of Personal Factors/Comorbidities that affect the Plan of Care: Expanded review of therapy/medical records (1-2):  MODERATE COMPLEXITY ASSESSMENT OF OCCUPATIONAL PERFORMANCE[de-identified]  
Activities of Daily Living:  
Basic ADLs (From Assessment) Complex ADLs (From Assessment) Feeding: Minimum assistance Oral Facial Hygiene/Grooming: Minimum assistance Bathing: Maximum assistance Upper Body Dressing: Moderate assistance Lower Body Dressing: Moderate assistance Toileting: Maximum assistance Instrumental ADL Meal Preparation: Total assistance Homemaking: Total assistance Grooming/Bathing/Dressing Activities of Daily Living Grooming Position Performed: Seated edge of bed Brushing/Combing Hair: Minimum assistance Cognitive Retraining Safety/Judgement: Fall prevention Bed/Mat Mobility Supine to Sit: Maximum assistance(of 2) Sit to Supine: Maximum assistance(of 2) Sit to Stand: Moderate assistance(of 2) Stand to Sit: Moderate assistance Scooting: Maximum assistance Cues: Verbal cues provided Most Recent Physical Functioning:  
Gross Assessment: 
AROM: Generally decreased, functional 
Strength: Generally decreased, functional 
         
  
Posture: 
  
Balance: 
Sitting: Impaired Sitting - Static: Fair (occasional) Sitting - Dynamic: Fair (occasional) Standing: Impaired Standing - Static: Constant support Standing - Dynamic : Poor Bed Mobility: 
Supine to Sit: Maximum assistance(of 2) Sit to Supine: Maximum assistance(of 2) Scooting: Maximum assistance Wheelchair Mobility: 
  
Transfers: 
Sit to Stand: Moderate assistance(of 2) Stand to Sit: Moderate assistance Patient Vitals for the past 6 hrs: 
 BP BP Patient Position SpO2 O2 Flow Rate (L/min) Pulse 20 1111 108/70 At rest 94 %  (!) 105  
20 1138   96 % 2 l/min  Mental Status Neurologic State: Confused Orientation Level: Oriented to person Cognition: Decreased attention/concentration, Impaired decision making Perception: Cues to maintain midline in sitting Perseveration: No perseveration noted Safety/Judgement: Fall prevention Physical Skills Involved: 1. Range of Motion 2. Balance 3. Strength 4. Activity Tolerance 5. Pain (acute) 6. Skin Integrity Cognitive Skills Affected (resulting in the inability to perform in a timely and safe manner): 1. Comprehension Psychosocial Skills Affected: 1. Habits/Routines 2. Environmental Adaptation 3. Social Interaction 4. Social Roles Number of elements that affect the Plan of Care: 3-5:  MODERATE COMPLEXITY CLINICAL DECISION MAKIN [facility] [address] [id number] AM-PAC 6 Clicks Daily Activity Inpatient Short Form How much help from another person does the patient currently need. .. Total A Lot A Little None 1. Putting on and taking off regular lower body clothing? [] 1   [x] 2   [] 3   [] 4  
2. Bathing (including washing, rinsing, drying)? [] 1   [x] 2   [] 3   [] 4  
3. Toileting, which includes using toilet, bedpan or urinal?   [] 1   [x] 2   [] 3   [] 4  
4. Putting on and taking off regular upper body clothing? [] 1   [] 2   [x] 3   [] 4  
5. Taking care of personal grooming such as brushing teeth? [] 1   [] 2   [x] 3   [] 4  
6. Eating meals? [] 1   [] 2   [x] 3   [] 4  
© 2007, Trustees of [address] [place] [address] [address] [address], under license to [employer]. All rights reserved Score:  Initial: 15 Most Recent: X (Date: -- ) Interpretation of Tool:  Represents activities that are increasingly more difficult (i.e. Bed mobility, Transfers, Gait). Medical Necessity:    
· Patient demonstrates · good ·  rehab potential due to higher previous functional level. Reason for Services/Other Comments: 
· Patient continues to require skilled intervention due to · Decreased independence with ADL. · . Use of outcome tool(s) and clinical judgement create a POC that gives a: LOW COMPLEXITY  
 
 
 
TREATMENT:  
(In addition to Assessment/Re-Assessment sessions the following treatments were rendered) Pre-treatment Symptoms/Complaints:   
Pain: Initial:  
Pain Intensity 1: 5  Post Session:  similar Today's treatment session addressed Decreased Strength, Decreased ADL/Functional Activities, Decreased Transfer Abilities, Decreased Balance, Increased Pain, Decreased Activity Tolerance, Decreased Flexibility/Joint Mobility, Decreased Skin Integrity/Hygeine, Decreased [place] with Home Exercise Program and Decreased Cognition to progress towards achieving goal(s) 1, 2 and 3.  During this session,  Physical Therapy addressed  Bed Mobility to progress towards their discipline specific goal(s). Co-treatment was necessary to improve patient's cognitive participation, ability to follow higher level commands, ability to increase activity demands and ability to return to normal functional activity. Neuromuscular Re-education: (23 minutes):  Exercise/activities per grid below to improve balance, coordination, kinesthetic sense, posture and proprioception. Required moderate visual, verbal, manual and tactile cues to promote dynamic balance in sitting. Re-Education Training Re-Education Training: Yes    Trunk Re-Education Other Activities: Reaching for tissue/combing hair seated edge of bed. Braces/Orthotics/Lines/Etc:  
· IV 
· O2 Device: Nasal cannula Treatment/Session Assessment:   
Response to Treatment:  Tolerated well without complications. · Interdisciplinary Collaboration:  
o Physical Therapist 
o Occupational Therapist 
o Registered Nurse · After treatment position/precautions:  
o Supine in bed 
o Bed/Chair-wheels locked 
o Bed in low position 
o Call light within reach 
o RN notified 
o Side rails x 2 
o lap belt · Compliance with Program/Exercises: Will assess as treatment progresses. · Recommendations/Intent for next treatment session: \"Next visit will focus on advancements to more challenging activities\". Total Treatment Duration: OT Patient Time In/Time Out Time In: 4371 Time Out: 1451 ELINOR Hastings, OTR/L

## 2020-05-06 NOTE — PROGRESS NOTES
Hospitalist Progress Note Admit Date:  2020 12:37 PM  
Name:  Ne Pierson Age:  80 y.o. 
:  1923 MRN:  [id number] PCP:  Shannon Carmona MD 
Treatment Team: Attending Provider: Nasir Edwards MD; Charge Nurse: Bruce Johnson; Utilization Review: Flora Simmonds; Care Manager: Larence Sandifer; Physical Therapist: Gabby Solis; Care Manager: Robinson Escobar RN Subjective: May 3, 2020 patient seen and evaluated. Yelling out for staff. Does not want to be left alone in the room. Notes some belly pain after eating. Has gotten pain medicine about 30 minutes ago May 4, 2020patient seen and evaluated; more calm today and sedated; a little bit confused tells me that her oxygen tube is for nausea medicine. May 5th 2020- pt seen- agitated and confused. States she does not want to fall again. .. wait. Let me get my bearings. Her eyes are closed and she will not open them; she is trying to get the lap belt off May 6, 2020patient seen and evaluated. She is less lethargic and less confused. Has some droopiness of the right eye and face Objective:  
 
Patient Vitals for the past 24 hrs: 
 Temp Pulse Resp BP SpO2  
20 1549 97.8 °F (36.6 °C) (!) 116 18 130/82 96 % 20 1300    94/64   
20 1138     96 % 20 1111 98 °F (36.7 °C) (!) 105 20 108/70 94 % 20 0727 99.1 °F (37.3 °C) 63 18 115/62 92 % 20 0527 98.4 °F (36.9 °C) 76 20 132/76 92 % 20 2347 99.3 °F (37.4 °C) 91 20 137/71 94 % 20 1943 98.3 °F (36.8 °C) 75 18 125/64 92 % Oxygen Therapy O2 Sat (%): 96 % (20 1549) Pulse via Oximetry: 41 beats per minute (20 1138) O2 Device: Nasal cannula (20 1138) O2 Flow Rate (L/min): 2 l/min (20 1138) Intake/Output Summary (Last 24 hours) at 2020 1633 Last data filed at 2020 1138 Gross per 24 hour Intake  Output 750 ml Net -750 ml  
   
 
 General:    Frail elderly female in no obvious cardiopulmonary distress, nasal cannula out of her nose; bruising in the upper chest wall; alert. CV:   RRR. No murmur, rub, or gallop. Lungs:   CTAB. No wheezing, rhonchi, or rales. Abdomen:   Soft, nontender, nondistended. Extremities: Warm and dry. No cyanosis or edema. Skin:     No rashes or jaundice. Data Review: 
I have reviewed all labs, meds, telemetry events, and studies from the last 24 hours. No results found for this or any previous visit (from the past 24 hour(s)). All Micro Results None Current Meds: 
Current Facility-Administered Medications Medication Dose Route Frequency  haloperidol lactate (HALDOL) injection 3 mg  3 mg IntraMUSCular Q6H PRN  
 metoprolol (LOPRESSOR) injection 5 mg  5 mg IntraVENous Q6H PRN  
 [Held by provider] divalproex (DEPAKOTE SPRINKLE) capsule 125 mg  125 mg Oral TID  ibuprofen (MOTRIN) tablet 400 mg  400 mg Oral Q6H PRN  
 famotidine (PEPCID) tablet 20 mg  20 mg Oral DAILY  amLODIPine (NORVASC) tablet 5 mg  5 mg Oral DAILY  aspirin chewable tablet 81 mg  81 mg Oral DAILY  brimonidine (ALPHAGAN) 0.2 % ophthalmic solution 1 Drop  1 Drop Right Eye Q8H  
 calcium-vitamin D (OS-JERICHO) 500 mg-200 unit tablet  1 Tab Oral BID  dorzolamide-timoloL (COSOPT) 22.3-6.8 mg/mL ophthalmic solution 1 Drop  1 Drop Both Eyes BID  pantoprazole (PROTONIX) tablet 40 mg  40 mg Oral ACB  fluticasone propionate (FLONASE) 50 mcg/actuation nasal spray 2 Spray  2 Spray Both Nostrils DAILY  levothyroxine (SYNTHROID) tablet 50 mcg  50 mcg Oral ACB  [Held by provider] lubiPROStone (AMITIZA) capsule 8 mcg (Patient Supplied)  8 mcg Oral DAILY  simethicone (MYLICON) tablet 857 mg  120 mg Oral Q6H PRN  
 atorvastatin (LIPITOR) tablet 20 mg  20 mg Oral QHS  sodium chloride (NS) flush 5-40 mL  5-40 mL IntraVENous Q8H  
 sodium chloride (NS) flush 5-40 mL  5-40 mL IntraVENous PRN  
  acetaminophen (TYLENOL) tablet 650 mg  650 mg Oral Q4H PRN  
 naloxone (NARCAN) injection 0.4 mg  0.4 mg IntraVENous PRN  
 ondansetron (ZOFRAN) injection 4 mg  4 mg IntraVENous Q4H PRN Other Studies (last 24 hours): No results found. Assessment and Plan:  
 
Hospital Problems as of 5/6/2020 Date Reviewed: 3/13/2019 Codes Class Noted - Resolved POA Debility ICD-10-CM: R53.81 ICD-9-CM: 799.3  5/1/2020 - Present Unknown Physical deconditioning ICD-10-CM: R53.81 ICD-9-CM: 799.3  5/1/2020 - Present Unknown Closed left scapular fracture ICD-10-CM: P12.275X ICD-9-CM: 811.00  5/1/2020 - Present Unknown * (Principal) Recurrent falls ICD-10-CM: R29.6 ICD-9-CM: V15.88  5/1/2020 - Present Unknown Fracture of one rib, left side, initial encounter for closed fracture ICD-10-CM: S22.32XA ICD-9-CM: 807.01  5/1/2020 - Present Unknown At risk for unsafe behavior ICD-10-CM: Z91.89 ICD-9-CM: V15.89  5/1/2020 - Present Unknown Wound of right leg ICD-10-CM: N30.411F ICD-9-CM: 891.0  1/22/2019 - Present Yes CN VI palsy (Chronic) ICD-10-CM: 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  
  Spring 2012- Dr. Gen Salvador Macular degeneration (Chronic) ICD-10-CM: H35.30 ICD-9-CM: 362.50  1/3/2013 - Present Unknown Overview Signed 1/3/2013  9:05 AM by MD Dr. Gen Yung Glaucoma (Chronic) ICD-10-CM: H40.9 ICD-9-CM: 365.9  1/3/2013 - Present Yes Overview Signed 1/3/2013  9:05 AM by MD Dr. Gen Yung Hearing loss, sensorineural (Chronic) ICD-10-CM: H90.5 ICD-9-CM: 389.10  1/3/2013 - Present Yes Senile osteoporosis (Chronic) ICD-10-CM: M81.0 ICD-9-CM: 733.01  10/31/2012 - Present Yes Overview Signed 1/3/2013  9:04 AM by MD Dr. Ruth Yung Hyperlipidemia (Chronic) ICD-10-CM: T04.5 ICD-9-CM: 272.4  10/31/2012 - Present Yes Hypertension (Chronic) ICD-10-CM: I10 
ICD-9-CM: 401.9  10/31/2012 - Present Yes Hypothyroidism (Chronic) ICD-10-CM: E03.9 ICD-9-CM: 244.9  10/31/2012 - Present Yes PLAN:   
· Patient seen by speech and okayed for mechanical soft thin liquid diet · CT chest was done and  it showed a comminuted scapular tip fracture but no rib fractures · Right scapular fracture will continue PRN pain meds; narcotics were confusing her and these have been held; so will continue motrin and tylenol for pain and stop narcotics · Elevated blood pressure resolved · Encephalopathy- multifactorial from meds and new surroundings and dementia- supportive care; patient with some droopiness of the right side of face will get MRI brain · Continue appropriate home medications · Continue supportive care · Plan is  for placement to rehab; Case management is aware and is working on this DC planning/Dispo:  rehab placement when stable DVT ppx: SCDs Signed: 
Jairo Bhandari MD

## 2020-05-06 NOTE — PROGRESS NOTES
Interdisciplinary Rounds completed. Nursing, Case Management, and Physician  present. Plan of care reviewed and updated.  
 
Possible d/c in am.

## 2020-05-06 NOTE — PROGRESS NOTES
Hourly rounding was performed on pt. No complaints of pain, nausea,vomiting. Bed is low, locked, call bell within reach. All needs met this shift. Will continue to monitor until next RN comes on.

## 2020-05-06 NOTE — PROGRESS NOTES
END OF SHIFT NOTE: 
 
INTAKE/OUTPUT 
05/05 0701 - 05/06 0700 In: -  
Out: 700 [Urine:700] Voiding: YES Catheter: NO 
Color: clear Drain: DIET Cardiac mechanical soft Flatus: Patient does have flatus present. Stool:  0 occurrences. Characteristics: 
  
 
Ambulating No 
 
Emesis: 0 occurrences. Characteristics: VITAL SIGNS Patient Vitals for the past 12 hrs: 
 Temp Pulse Resp BP SpO2  
05/06/20 1549 97.8 °F (36.6 °C) (!) 116 18 130/82 96 % 05/06/20 1300    94/64   
05/06/20 1138     96 % 05/06/20 1111 98 °F (36.7 °C) (!) 105 20 108/70 94 % 05/06/20 0727 99.1 °F (37.3 °C) 63 18 115/62 92 % Pain Assessment Pain Intensity 1: 0 (05/06/20 1500) Pain Location 1: Generalized Pain Intervention(s) 1: Medication (see MAR) Patient Stated Pain Goal: 0 Janey Watters RN

## 2020-05-06 NOTE — PROGRESS NOTES
Problem: Falls - Risk of 
Goal: *Absence of Falls Description: Document Danne Lobe Fall Risk and appropriate interventions in the flowsheet. Outcome: Progressing Towards Goal 
Note: Fall Risk Interventions: 
Mobility Interventions: Patient to call before getting OOB Mentation Interventions: Door open when patient unattended Medication Interventions: Evaluate medications/consider consulting pharmacy Elimination Interventions: Call light in reach History of Falls Interventions: Evaluate medications/consider consulting pharmacy, Room close to nurse's station Problem: Pressure Injury - Risk of 
Goal: *Prevention of pressure injury Description: Document Dontae Scale and appropriate interventions in the flowsheet. Outcome: Progressing Towards Goal 
Note: Pressure Injury Interventions: 
Sensory Interventions: Minimize linen layers Moisture Interventions: Absorbent underpads Activity Interventions: PT/OT evaluation Mobility Interventions: PT/OT evaluation Nutrition Interventions: Document food/fluid/supplement intake Friction and Shear Interventions: HOB 30 degrees or less Problem: Patient Education: Go to Patient Education Activity Goal: Patient/Family Education Outcome: Progressing Towards Goal

## 2020-05-06 NOTE — PROGRESS NOTES
Pt arrived to MRI for brain MRI and was unable to hold still. Pt extremely confused and not following commands. Nurse aware, pt sent back to room.

## 2020-05-06 NOTE — PROGRESS NOTES
Problem: Mobility Impaired (Adult and Pediatric) Goal: *Acute Goals and Plan of Care (Insert Text) Description: LTG: 
(1.)Ms. Harshad Jeronimo will move from supine to sit and sit to supine  in bed with MODERATE ASSIST within 7 treatment day(s). (2.)Ms. Harshad Jeronimo will transfer from bed to chair and chair to bed with MODERATE ASSIST using the least restrictive device within 7 treatment day(s). (3.)Ms. Harshad Jeronimo will ambulate with MODERATE ASSIST for 10 feet with the least restrictive device within 7 treatment day(s). (4)Ms. Harshad Jeronimo will perform HEP with cues and assistance to increase safety on his feet in 7 days. ________________________________________________________________________________________________ Outcome: Progressing Towards Goal 
  
PHYSICAL THERAPY: Daily Note and PM 5/6/2020 INPATIENT: PT Visit Days : 2 Payor: [employer] MMP / Plan: [place] [employer] / Product Type: [employer] Medicare /   
  
NAME/AGE/GENDER: Rashad Cummings is a 80 y.o. female PRIMARY DIAGNOSIS: Recurrent falls [R29.6] Macular degeneration [H35.30] At risk for unsafe behavior [Z91.89] Fracture of one rib, left side, initial encounter for closed fracture [S22.32XA] Closed left scapular fracture [S42.102A] Debility [R53.81] Physical deconditioning [R53.81] Recurrent falls Recurrent falls ICD-10: Treatment Diagnosis:  
 · Generalized Muscle Weakness (M62.81) · Other abnormalities of gait and mobility (R26.89) · History of falling (Z91.81) Precaution/Allergies: 
Patient has no known allergies. ASSESSMENT:  
 
Ms. Harshad Jeronimo presents with significant debility and very [place]. 
 5/6: Upon entry, pnt is supine in bed and agreeable to therapy. BP is taken at 94/64. The lap belt had been removed and she performs supine to sit with max assist x2. Scooting to EOB with max assistx2. She performs seated self-care w/ OT while PT focused functional mobility.  STS w/ max assist x2 w/ RW, followed by stand to sit with max assist x2. In standing, balance in poor with a strong posterior. Pnt is confused, but answers simple questions. Sit to supine performed with max assist x2. Lap belt was reapplied and nursing notified at the end of session. At the end of the session, pnt was supine in bed under the lap belt with nursing present. She is currently functioning below her baseline and will benefit from continued acute care therapy at this time, as well as co-treatment with OT. This section established at most recent assessment PROBLEM LIST (Impairments causing functional limitations): 1. Decreased Strength 2. Decreased ADL/Functional Activities 3. Decreased Transfer Abilities 4. Decreased Ambulation Ability/Technique 5. Decreased Balance 6. Increased Pain 7. Decreased Activity Tolerance 8. Increased Fatigue 9. Decreased Flexibility/Joint Mobility 10. Decreased Knowledge of Precautions 11. Decreased [place] with Home Exercise Program 
 INTERVENTIONS PLANNED: (Benefits and precautions of physical therapy have been discussed with the patient.) 1. Balance Exercise 2. Bed Mobility 3. Gait Training 4. Home Exercise Program (HEP) 5. Range of Motion (ROM) 6. Therapeutic Activites 7. Therapeutic Exercise/Strengthening 8. Transfer Training TREATMENT PLAN: Frequency/Duration: daily for duration of hospital stay Rehabilitation Potential For Stated Goals: Fair REHAB RECOMMENDATIONS (at time of discharge pending progress):   
Placement: 
snf 
Equipment:  
? None at this time HISTORY:  
History of Present Injury/Illness (Reason for Referral): 
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  
Pt lives by herself, she was found down by her neighbor today around 56 hrs. As per pt, she fell around 1000 hrs this AM. Pt reports recurrent falls, lives by herself. She reports of multiple bruises all over her body, and she reportedly hit her head today. She c/o mild to moderate pain diffusely throughout her body joints. She denies fever, nausea/vomiting, chest pain, headache, weakness/numbness/tingling in extremities, diarrhea, urinary symptoms. ER physician attempted to make pt walk, but she is not able to stand up, needed moderate 2 ppl assistance and was noted to be unsteady. ER work up showed an unremarkable CT head, suspected left scapular tip fracture with left 6th rib fracture. Given pt is at high risk of falls and is unsafe being alone at home, and pain management, pt will admitted for PT/OT eval and placement. Past Medical History/Comorbidities: Ms. Fransisco Garvey  has a past medical history of CN VI palsy (1/3/2013), Constipation, Fatigue (10/31/2012), GERD (gastroesophageal reflux disease), Glaucoma (1/3/2013), Hearing loss, sensorineural (1/3/2013), History of subdural hematoma (post traumatic), Hypercholesterolemia, Hyperlipemia (10/31/2012), Hyperlipidemia (10/31/2012), Hypertension (10/31/2012), Macular degeneration (1/3/2013), Mitral valve disorders(424.0) (10/31/2012), Osteoarthrosis, unspecified whether generalized or localized, lower leg (3/13/2013), Other malaise and fatigue (10/31/2012), Senile osteoporosis (10/31/2012), Skin cancer of scalp (2012), TIA (transient ischemic attack), and Unspecified hypothyroidism (10/31/2012). Ms. Fransisco Garvey  has a past surgical history that includes pr rectal sensation test, balloon; hx appendectomy; hx colonoscopy; and hx malignant skin lesion excision. Social History/Living Environment:  
Home Environment: Private residence Current DME Used/Available at Home: [address], rollator Prior Level of Function/Work/Activity: 
Falling, rollator, lives alone Number of Personal Factors/Comorbidities that affect the Plan of Care: 3+: HIGH COMPLEXITY EXAMINATION:  
Most Recent Physical Functioning:  
Gross Assessment: 
  
         
  
Posture: 
  
Balance: 
Sitting: Impaired Sitting - Static: Fair (occasional) Sitting - Dynamic: Fair (occasional) Standing: Impaired Standing - Static: Constant support Standing - Dynamic : Poor(strong posterior lean) Bed Mobility: 
Rolling: Maximum assistance;Assist x2 Supine to Sit: Maximum assistance;Assist x2 Sit to Supine: Maximum assistance;Assist x2 Scooting: Maximum assistance;Assist x2 Duration: 23 Minutes Wheelchair Mobility: 
  
Transfers: 
Sit to Stand: Maximum assistance;Assist x2 Stand to Sit: Maximum assistance;Assist x2 Gait:unable Body Structures Involved: 1. Bones 2. Joints 3. Muscles 4. Ligaments Body Functions Affected: 1. Movement Related Activities and Participation Affected: 1. Mobility Number of elements that affect the Plan of Care: 3: MODERATE COMPLEXITY CLINICAL PRESENTATION:  
Presentation: Stable and uncomplicated: LOW COMPLEXITY CLINICAL DECISION MAKIN[address] [place] [address] AM-PAC 6 Clicks Basic Mobility Inpatient Short Form How much difficulty does the patient currently have. .. Unable A Lot A Little None 1. Turning over in bed (including adjusting bedclothes, sheets and blankets)? [] 1   [x] 2   [] 3   [] 4  
2. Sitting down on and standing up from a chair with arms ( e.g., wheelchair, bedside commode, etc.)   [] 1   [x] 2   [] 3   [] 4  
3. Moving from lying on back to sitting on the side of the bed? [] 1   [x] 2   [] 3   [] 4 How much help from another person does the patient currently need. .. Total A Lot A Little None 4. Moving to and from a bed to a chair (including a wheelchair)? [] 1   [x] 2   [] 3   [] 4  
5. Need to walk in hospital room? [x] 1   [] 2   [] 3   [] 4  
6. Climbing 3-5 steps with a railing? [x] 1   [] 2   [] 3   [] 4  
© , Trustees of [address] [place] [id number], under license to [employer].  All rights reserved Score:  Initial: 10 Most Recent: X (Date: -- ) Interpretation of Tool:  Represents activities that are increasingly more difficult (i.e. Bed mobility, Transfers, Gait). Medical Necessity:    
· Patient is expected to demonstrate progress in  
· strength, range of motion, and balance ·  to  
· decrease assistance required with exercises and functional mobility · . Reason for Services/Other Comments: 
· Patient · continues to require present interventions due to patient's inability to perform exercises and functional mobility independently · . Use of outcome tool(s) and clinical judgement create a POC that gives a: Questionable prediction of patient's progress: MODERATE COMPLEXITY  
  
 
 
 
TREATMENT:  
(In addition to Assessment/Re-Assessment sessions the following treatments were rendered) Pre-treatment Symptoms/Complaints:  No pain reported Pain: Initial:  
   Post Session:    
assessment Therapeutic Activity: (23 Minutes   ):  Therapeutic activities including Bed transfers, standing, and rolling to improve mobility, strength, and balance. Required maximal to moderate cues   to promote static balance in standing. Braces/Orthotics/Lines/Etc:  
· IV 
· O2 Device: Nasal cannula Treatment/Session Assessment:   
· Response to Treatment:  debility · Interdisciplinary Collaboration:  
o Physical Therapist 
o Occupational Therapist 
o Registered Nurse · After treatment position/precautions:  
o Supine in bed 
o Bed/Chair-wheels locked 
o Bed in low position 
o Call light within reach 
o Nurse at bedside 
o lapbelt applied · Compliance with Program/Exercises: Will assess as treatment progresses · Recommendations/Intent for next treatment session: \"Next visit will focus on reduction in assistance provided\". Total Treatment Duration: PT Patient Time In/Time Out Time In: 2777 Time Out: 3928 Johnny Mora

## 2020-05-07 NOTE — PROGRESS NOTES
Hospitalist Progress Note Admit Date:  2020 12:37 PM  
Name:  Halle Coates Age:  80 y.o. 
:  1923 MRN:  [id number] PCP:  Della Lopez MD 
Treatment Team: Attending Provider: Irma Latif MD; Charge Nurse: Rancho Smith; Utilization Review: Stella Dunlap; Care Manager: Mary Penny; Physical Therapist: Fausto Small Charge Nurse: Kennedi Rodriguez Subjective: May 3, 2020 patient seen and evaluated. Yelling out for staff. Does not want to be left alone in the room. Notes some belly pain after eating. Has gotten pain medicine about 30 minutes ago May 4, 2020patient seen and evaluated; more calm today and sedated; a little bit confused tells me that her oxygen tube is for nausea medicine. May 5th 2020- pt seen- agitated and confused. States she does not want to fall again. .. wait. Let me get my bearings. Her eyes are closed and she will not open them; she is trying to get the lap belt off May 6, 2020patient seen and evaluated. She is less lethargic and less confused. Has some droopiness of the right eye and face May 7th 2020- pt unable to have mri last pm because of agitation; had to be sedated to get it; no evidence of a cva Objective:  
 
Patient Vitals for the past 24 hrs: 
 Temp Pulse Resp BP SpO2  
20 1516 96.5 °F (35.8 °C) 87  148/87 95 % 20 1110 97.5 °F (36.4 °C) 84 16 129/75 99 % 20 0708 97.6 °F (36.4 °C) 73 18 142/81 96 % 20 0335 97.3 °F (36.3 °C) 68 17 131/73 98 % 20 2232 97.4 °F (36.3 °C) 69 18 123/75 98 % 20 1957 96.8 °F (36 °C) 68 18 117/75 98 % Oxygen Therapy O2 Sat (%): 95 % (20 1516) Pulse via Oximetry: 41 beats per minute (20 1138) O2 Device: Nasal cannula (20 1138) O2 Flow Rate (L/min): 2 l/min (20 113) No intake or output data in the 24 hours ending 20 8053 General:    Frail elderly female in no obvious cardiopulmonary distress, nasal cannula out of her nose; bruising in the upper chest wall; alert. CV:   RRR. No murmur, rub, or gallop. Lungs:   CTAB. No wheezing, rhonchi, or rales. Abdomen:   Soft, nontender, nondistended. Extremities: Warm and dry. No cyanosis or edema. Skin:     No rashes or jaundice. Data Review: 
I have reviewed all labs, meds, telemetry events, and studies from the last 24 hours. No results found for this or any previous visit (from the past 24 hour(s)). All Micro Results None Current Meds: 
Current Facility-Administered Medications Medication Dose Route Frequency  metoprolol (LOPRESSOR) injection 5 mg  5 mg IntraVENous Q6H PRN  
 [Held by provider] divalproex (DEPAKOTE SPRINKLE) capsule 125 mg  125 mg Oral TID  ibuprofen (MOTRIN) tablet 400 mg  400 mg Oral Q6H PRN  
 famotidine (PEPCID) tablet 20 mg  20 mg Oral DAILY  amLODIPine (NORVASC) tablet 5 mg  5 mg Oral DAILY  aspirin chewable tablet 81 mg  81 mg Oral DAILY  brimonidine (ALPHAGAN) 0.2 % ophthalmic solution 1 Drop  1 Drop Right Eye Q8H  
 calcium-vitamin D (OS-JERICHO) 500 mg-200 unit tablet  1 Tab Oral BID  dorzolamide-timoloL (COSOPT) 22.3-6.8 mg/mL ophthalmic solution 1 Drop  1 Drop Both Eyes BID  pantoprazole (PROTONIX) tablet 40 mg  40 mg Oral ACB  fluticasone propionate (FLONASE) 50 mcg/actuation nasal spray 2 Spray  2 Spray Both Nostrils DAILY  levothyroxine (SYNTHROID) tablet 50 mcg  50 mcg Oral ACB  [Held by provider] lubiPROStone (AMITIZA) capsule 8 mcg (Patient Supplied)  8 mcg Oral DAILY  simethicone (MYLICON) tablet 959 mg  120 mg Oral Q6H PRN  
 atorvastatin (LIPITOR) tablet 20 mg  20 mg Oral QHS  sodium chloride (NS) flush 5-40 mL  5-40 mL IntraVENous Q8H  
 sodium chloride (NS) flush 5-40 mL  5-40 mL IntraVENous PRN  
 acetaminophen (TYLENOL) tablet 650 mg  650 mg Oral Q4H PRN  
  naloxone (NARCAN) injection 0.4 mg  0.4 mg IntraVENous PRN  
 ondansetron (ZOFRAN) injection 4 mg  4 mg IntraVENous Q4H PRN Other Studies (last 24 hours): 
Mri Brain Wo Cont Result Date: 5/7/2020 MRI brain without contrast: 05/07/2020 History: Confusion, agitation, frequent falls with head injury. Imaging sequences: Sagittal short TR/short TE, axial short TR/short TE, long TR/long TE, FLAIR, gradient recall, diffusion weighted images and ADC mapping. Coronal FLAIR. Imaging was performed on a 1.5 Yomaira magnet. Comparison: None. Correlation is made to the CT scan of the brain 05/05/2020. Findings: There is an incidental cavum septum pellucidum. The ventricles and sulci are prominent compatible with moderate volume loss. There are no extra-axial fluid collections. Normal flow voids are present within all of the major intracranial vessels. No evidence of intraparenchymal hemorrhage or mass effect is identified. There are no areas of restricted diffusion to suggest an acute or subacute infarction. Patchy and discrete foci of T2 prolongation are present within the supratentorial white matter. These are nonspecific findings but would be most compatible with relatively mild chronic small vessel ischemic changes. There are a few opacified bilateral mastoid air cells. Darlynn Magic Impression: 1. Moderate volume loss. 2. Relatively mild chronic small vessel ischemic change. Assessment and Plan:  
 
Hospital Problems as of 5/7/2020 Date Reviewed: 3/13/2019 Codes Class Noted - Resolved POA Debility ICD-10-CM: R53.81 ICD-9-CM: 799.3  5/1/2020 - Present Unknown Physical deconditioning ICD-10-CM: R53.81 ICD-9-CM: 799.3  5/1/2020 - Present Unknown Closed left scapular fracture ICD-10-CM: T68.798L ICD-9-CM: 811.00  5/1/2020 - Present Unknown * (Principal) Recurrent falls ICD-10-CM: R29.6 ICD-9-CM: V15.88  5/1/2020 - Present Unknown Fracture of one rib, left side, initial encounter for closed fracture ICD-10-CM: S22.32XA ICD-9-CM: 807.01  5/1/2020 - Present Unknown At risk for unsafe behavior ICD-10-CM: Z91.89 ICD-9-CM: V15.89  5/1/2020 - Present Unknown Wound of right leg ICD-10-CM: G29.007K ICD-9-CM: 891.0  1/22/2019 - Present Yes CN VI palsy (Chronic) ICD-10-CM: H49.20 ICD-9-CM: 378.54  1/3/2013 - Present Yes Overview Signed 1/3/2013  9:03 AM by Ladon Epley, MD  
  Spring 2012- Dr. Peterson Alvarez Macular degeneration (Chronic) ICD-10-CM: H35.30 ICD-9-CM: 362.50  1/3/2013 - Present Unknown Overview Signed 1/3/2013  9:05 AM by Ladon Epley, MD Dr. Bart Carota Glaucoma (Chronic) ICD-10-CM: H40.9 ICD-9-CM: 365.9  1/3/2013 - Present Yes Overview Signed 1/3/2013  9:05 AM by Ladon Epley, MD Dr. Bart Carota Hearing loss, sensorineural (Chronic) ICD-10-CM: H90.5 ICD-9-CM: 389.10  1/3/2013 - Present Yes Senile osteoporosis (Chronic) ICD-10-CM: M81.0 ICD-9-CM: 733.01  10/31/2012 - Present Yes Overview Signed 1/3/2013  9:04 AM by Ladon Epley, MD Dr. Kavin Musa Hyperlipidemia (Chronic) ICD-10-CM: E96.0 ICD-9-CM: 272.4  10/31/2012 - Present Yes Hypertension (Chronic) ICD-10-CM: I10 
ICD-9-CM: 401.9  10/31/2012 - Present Yes Hypothyroidism (Chronic) ICD-10-CM: E03.9 ICD-9-CM: 244.9  10/31/2012 - Present Yes PLAN:   
· Patient seen by speech and okayed for mechanical soft thin liquid diet · CT chest was done and  it showed a comminuted scapular tip fracture but no rib fractures · Right scapular fracture will continue PRN pain meds; narcotics were confusing her and these have been held; so will continue motrin and tylenol for pain and stop narcotics · Elevated blood pressure resolved · Encephalopathy- multifactorial from meds and new surroundings and dementia- supportive care; no evidence of a stroke on her MRI · Continue appropriate home medications · Continue supportive care · Plan is  for placement to rehab; Case management is aware and is working on this DC planning/Dispo:  rehab placement when stable-hopefully tomorrow or Saturday; power of  is Mr. family at [phone]. DVT ppx: SCDs Signed: 
Darrius Peters MD

## 2020-05-07 NOTE — PROGRESS NOTES
Interdisciplinary Rounds completed. Nursing, Case Management, and Physician  present. Plan of care reviewed and updated. Possible rehab placement COVID test ordered per protocol.

## 2020-05-07 NOTE — PROGRESS NOTES
Hourly rounds completed. Voided once incontinent, scant amount. Bladder scanned 413. Ordered to straight cath per MD, done, with 250 output. Pt is retaining urine, and refuses to drink. Pt is resisting care and hits staffs. Talked to her in a low voice,pt calms down. Will report to oncoming RN.

## 2020-05-07 NOTE — PROGRESS NOTES
END OF SHIFT NOTE: 
 
INTAKE/OUTPUT 
05/06 0701 - 05/07 0700 In: -  
Out: 550 [Urine:550] Voiding: NO-In and OUT cath required Catheter: NO 
Color: [employer] Drain: DIET Cardiac Flatus: Patient does have flatus present. Stool:  0 occurrences. Characteristics: 
  
 
Ambulating No 
 
Emesis: 0 occurrences. Characteristics: VITAL SIGNS Patient Vitals for the past 12 hrs: 
 Temp Pulse Resp BP SpO2  
05/07/20 1924 96.1 °F (35.6 °C) 62  120/81 96 % 05/07/20 1516 96.5 °F (35.8 °C) 87  148/87 95 % 05/07/20 1110 97.5 °F (36.4 °C) 84 16 129/75 99 % Pain Assessment Pain Intensity 1: 0 (05/07/20 1500) Pain Location 1: Generalized Pain Intervention(s) 1: Medication (see MAR) Patient Stated Pain Goal: 0 Deirdre Ley RN

## 2020-05-07 NOTE — PROGRESS NOTES
Problem: Mobility Impaired (Adult and Pediatric) Goal: *Acute Goals and Plan of Care (Insert Text) Description: LTG: 
(1.)Ms. Leonides Elizabeth will move from supine to sit and sit to supine  in bed with MODERATE ASSIST within 7 treatment day(s). (2.)Ms. Leonides Elizabeth will transfer from bed to chair and chair to bed with MODERATE ASSIST using the least restrictive device within 7 treatment day(s). (3.)Ms. Leonides Elizabeth will ambulate with MODERATE ASSIST for 10 feet with the least restrictive device within 7 treatment day(s). (4)Ms. Leonides Elizabeth will perform HEP with cues and assistance to increase safety on his feet in 7 days. ________________________________________________________________________________________________ Outcome: Progressing Towards Goal 
  
PHYSICAL THERAPY: Daily Note and PM 5/7/2020 INPATIENT: PT Visit Days : 4 Payor: Whit Deluca MMP / Plan: [facility] [employer] MMP / Product Type: Managed Care Medicare /   
  
NAME/AGE/GENDER: Aime Peter is a 80 y.o. female PRIMARY DIAGNOSIS: Recurrent falls [R29.6] Macular degeneration [H35.30] At risk for unsafe behavior [Z91.89] Fracture of one rib, left side, initial encounter for closed fracture [S22.32XA] Closed left scapular fracture [S42.102A] Debility [R53.81] Physical deconditioning [R53.81] Recurrent falls Recurrent falls ICD-10: Treatment Diagnosis:  
 · Generalized Muscle Weakness (M62.81) · Other abnormalities of gait and mobility (R26.89) · History of falling (Z91.81) Precaution/Allergies: 
Patient has no known allergies. ASSESSMENT:  
 
Ms. Leonides Elizabeth presents with significant debility and very [place]. 
 5/6: Upon entry, pnt is supine in bed and agreeable to therapy. Continues to present with right sided facial drooping. BP is 148/87. she performs supine to sit with max assist x2. Scooting to EOB with max assistx2. She exhibits very poor seating tolerance and becomes mildly agitated.   Sit to supine performed with max assist x2. At the end of the session, pnt was supine in bed with call lights within reach. Overall, pnt is declining and not making good progress. She is currently functioning below her baseline and will benefit from continued acute care therapy at this time, as well as co-treatment with OT. This section established at most recent assessment PROBLEM LIST (Impairments causing functional limitations): 1. Decreased Strength 2. Decreased ADL/Functional Activities 3. Decreased Transfer Abilities 4. Decreased Ambulation Ability/Technique 5. Decreased Balance 6. Increased Pain 7. Decreased Activity Tolerance 8. Increased Fatigue 9. Decreased Flexibility/Joint Mobility 10. Decreased Knowledge of Precautions 11. Decreased [place] with Home Exercise Program 
 INTERVENTIONS PLANNED: (Benefits and precautions of physical therapy have been discussed with the patient.) 1. Balance Exercise 2. Bed Mobility 3. Gait Training 4. Home Exercise Program (HEP) 5. Range of Motion (ROM) 6. Therapeutic Activites 7. Therapeutic Exercise/Strengthening 8. Transfer Training TREATMENT PLAN: Frequency/Duration: daily for duration of hospital stay Rehabilitation Potential For Stated Goals: Fair REHAB RECOMMENDATIONS (at time of discharge pending progress):   
Placement: 
snf 
Equipment:  
? None at this time HISTORY:  
History of Present Injury/Illness (Reason for Referral): 
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 Pt lives by herself, she was found down by her neighbor today around 1300 hrs. As per pt, she fell around 1000 hrs this AM. Pt reports recurrent falls, lives by herself.  She reports of multiple bruises all over her body, and she reportedly hit her head today. She c/o mild to moderate pain diffusely throughout her body joints. She denies fever, nausea/vomiting, chest pain, headache, weakness/numbness/tingling in extremities, diarrhea, urinary symptoms. ER physician attempted to make pt walk, but she is not able to stand up, needed moderate 2 ppl assistance and was noted to be unsteady. ER work up showed an unremarkable CT head, suspected left scapular tip fracture with left 6th rib fracture. Given pt is at high risk of falls and is unsafe being alone at home, and pain management, pt will admitted for PT/OT eval and placement. Past Medical History/Comorbidities: Ms. Quique Roth  has a past medical history of CN VI palsy (1/3/2013), Constipation, Fatigue (10/31/2012), GERD (gastroesophageal reflux disease), Glaucoma (1/3/2013), Hearing loss, sensorineural (1/3/2013), History of subdural hematoma (post traumatic), Hypercholesterolemia, Hyperlipemia (10/31/2012), Hyperlipidemia (10/31/2012), Hypertension (10/31/2012), Macular degeneration (1/3/2013), Mitral valve disorders(424.0) (10/31/2012), Osteoarthrosis, unspecified whether generalized or localized, lower leg (3/13/2013), Other malaise and fatigue (10/31/2012), Senile osteoporosis (10/31/2012), Skin cancer of scalp (2012), TIA (transient ischemic attack), and Unspecified hypothyroidism (10/31/2012). Ms. Quique Roth  has a past surgical history that includes pr rectal sensation test, balloon; hx appendectomy; hx colonoscopy; and hx malignant skin lesion excision. Social History/Living Environment:  
Home Environment: Private residence Current DME Used/Available at Home: ricardo Solis Prior Level of Function/Work/Activity: 
Falling, rollator, lives alone Number of Personal Factors/Comorbidities that affect the Plan of Care: 3+: HIGH COMPLEXITY EXAMINATION:  
Most Recent Physical Functioning:  
Gross Assessment: 
  
         
  
Posture: 
  
Balance: 
Sitting: Impaired Sitting - Static: Fair (occasional) Sitting - Dynamic: Fair (occasional) Bed Mobility: 
Rolling: Maximum assistance;Assist x2 Supine to Sit: Maximum assistance;Assist x2 Sit to Supine: Maximum assistance;Assist x2 Scooting: Maximum assistance;Assist x2 Duration: 12 Minutes Wheelchair Mobility: 
  
Transfers: 
  
Gait:unable Body Structures Involved: 1. Bones 2. Joints 3. Muscles 4. Ligaments Body Functions Affected: 1. Movement Related Activities and Participation Affected: 1. Mobility Number of elements that affect the Plan of Care: 3: MODERATE COMPLEXITY CLINICAL PRESENTATION:  
Presentation: Stable and uncomplicated: LOW COMPLEXITY CLINICAL DECISION MAKIN[address] [place] [address] AM-PAC 6 Clicks Basic Mobility Inpatient Short Form How much difficulty does the patient currently have. .. Unable A Lot A Little None 1. Turning over in bed (including adjusting bedclothes, sheets and blankets)? [] 1   [x] 2   [] 3   [] 4  
2. Sitting down on and standing up from a chair with arms ( e.g., wheelchair, bedside commode, etc.)   [] 1   [x] 2   [] 3   [] 4  
3. Moving from lying on back to sitting on the side of the bed? [] 1   [x] 2   [] 3   [] 4 How much help from another person does the patient currently need. .. Total A Lot A Little None 4. Moving to and from a bed to a chair (including a wheelchair)? [] 1   [x] 2   [] 3   [] 4  
5. Need to walk in hospital room? [x] 1   [] 2   [] 3   [] 4  
6. Climbing 3-5 steps with a railing? [x] 1   [] 2   [] 3   [] 4  
© , Trustees of [address] [place] [address], under license to [employer]. All rights reserved Score:  Initial: 10 Most Recent: X (Date: -- ) Interpretation of Tool:  Represents activities that are increasingly more difficult (i.e. Bed mobility, Transfers, Gait). Medical Necessity:    
· Patient is expected to demonstrate progress in  
· strength, range of motion, and balance ·  to  
 · decrease assistance required with exercises and functional mobility · . Reason for Services/Other Comments: 
· Patient · continues to require present interventions due to patient's inability to perform exercises and functional mobility independently · . Use of outcome tool(s) and clinical judgement create a POC that gives a: Questionable prediction of patient's progress: MODERATE COMPLEXITY  
  
 
 
 
TREATMENT:  
(In addition to Assessment/Re-Assessment sessions the following treatments were rendered) Pre-treatment Symptoms/Complaints:  No pain reported Pain: Initial:  
   Post Session:    
assessment Therapeutic Activity: (12 Minutes   ):  Therapeutic activities including Bed transfers, sitting and rolling to improve mobility, strength, and balance. Required maximal to moderate cues   to promote motor control of trunk in sitting. Braces/Orthotics/Lines/Etc:  
· IV 
· O2 Device: Nasal cannula Treatment/Session Assessment:   
· Response to Treatment:  debility · Interdisciplinary Collaboration:  
o Physical Therapist 
o Registered Nurse 
o Rehabilitation Attendant · After treatment position/precautions:  
o Supine in bed 
o Bed/Chair-wheels locked 
o Bed in low position 
o Call light within reach 
o Nurse at bedside 
o lapbelt applied · Compliance with Program/Exercises: Will assess as treatment progresses · Recommendations/Intent for next treatment session: \"Next visit will focus on reduction in assistance provided\". Total Treatment Duration: PT Patient Time In/Time Out Time In: 3476 Time Out: 6007 Reema Pina

## 2020-05-08 NOTE — PROGRESS NOTES
Interdisciplinary Rounds completed. Nursing, Case Management, and Physician  present. Plan of care reviewed and updated.  
 
Rehab probably in the am.

## 2020-05-08 NOTE — PROGRESS NOTES
Hourly rounds performed. All needs meet. Patient more awake and alert this afternoon. Communicating needs. Pain managed per MAR. Givens patent and draining estrada clear. Bed low/locked. Call light within reach. Patient denies needs at this time.   Will continue to monitor and report to oncoming RN

## 2020-05-08 NOTE — PROGRESS NOTES
Nutrition Assessment for:  
Length of Stay Best Practice Alert for Malnutrition Screening Tool Indetified: Recently Lost Weight Without Trying: Yes,  Eating Poorly Due to Decreased Appetite: Yes Did not trigger referral at that time because amount of weight loss field was not entered This assessment was completed remotely. Patient confused and [place]. Interview deferred. Plan is discharge to rehab tomorrow. Assessment:  
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 Brought in via EMS after being found down by her neighbor Admitted d/t debility, recurrent falls, left scapular and 6th rib fracture and pain management. Pt with skin tears. DIET CARDIAC Mechanical Soft  Per SLP evaluation Per documentation, patient consuming average 11% of 4 recorded meals in 7 days. Anthropometrics: 
Height: 5' 7\" (170.2 cm), Weight: 54.3 kg (119 lb 12.8 oz), Weight Source: Bed, Body mass index is 18.76 kg/m². BMI class of Underweight. Weight hx per EMR ( Based on [facility] care functionality, RD cannot know if these weight are actual versus stated): WT / BMI WEIGHT  
12/11/2019 121 lb  
11/8/2019 122 lb  
10/11/2019 120 lb 3.2 oz  
9/13/2019 121 lb 12.8 oz  
8/23/2019 124 lb  
8/9/2019 122 lb 6.4 oz  
7/26/2019 121 lb 4.1 oz  
7/12/2019 120 lb  
6/28/2019 122 lb 12.8 oz  
6/12/2019 124 lb  
6/7/2019 124 lb 6.4 oz  
5/24/2019 124 lb 6.4 oz  
 1-4 % change in wt within 1 year. This does not meet ASPEN malnutrition criteria. Macronutrient needs: (using Bed scale 54.3 kg)-underweight,skin tears EER:6120-3584 kcal/day (25-30 kcal/kg) EPR: 68-82 g/day (20% of kcals) Nutrition Diagnosis: 
Inadequate protein-energy intake related to inadequate protein-energy intake  as evidenced by intake meeting <25% of needs. Nutrition Intervention: 
Meals and snacks: Liberalize to mechanical soft regular diet(no cardiac restriction) Medical food supplement therapy: Commercial beverage- Ensure Enlive tid Discharge Nutrition Plan: Continue Ensure Enlive tid. F/U by nutrtion services at [facility]. Joaquin Contreras, [address] [place] [address], [address] [place] [address]

## 2020-05-08 NOTE — PROGRESS NOTES
Hospitalist Progress Note Admit Date:  2020 12:37 PM  
Name:  Ciara Vega Age:  80 y.o. 
:  1923 MRN:  [id number] PCP:  Gurwinder Baig MD 
Treatment Team: Attending Provider: Bertram Vázquez MD; Charge Nurse: Miguelito Guardado; Utilization Review: Asif Quesada; Care Manager: Charles Banerjee; Charge Nurse: Wilson Escobar Primary Nurse: Katy Kimble RN; Physical Therapy Assistant: Senait Ordoñez PTA Subjective: May 3, 2020 patient seen and evaluated. Yelling out for staff. Does not want to be left alone in the room. Notes some belly pain after eating. Has gotten pain medicine about 30 minutes ago May 4, 2020patient seen and evaluated; more calm today and sedated; a little bit confused tells me that her oxygen tube is for nausea medicine. May 5th 2020- pt seen- agitated and confused. States she does not want to fall again. .. wait. Let me get my bearings. Her eyes are closed and she will not open them; she is trying to get the lap belt off May 6, 2020patient seen and evaluated. She is less lethargic and less confused. Has some droopiness of the right eye and face May 7th 2020- pt unable to have mri last pm because of agitation; had to be sedated to get it; no evidence of a cva May 8, 2020patient seen and evaluated. She was apparently awake for most of the night and somewhat agitated and restless this morning she is not easy to arouse but nods her head in the affirmative and has eaten from nursing staff. Unfortunately she has not voided on her own and had a and out cath done x3 so we are now going to place a [facility]. Objective:  
 
Patient Vitals for the past 24 hrs: 
 Temp Pulse Resp BP SpO2  
20 1137 98 °F (36.7 °C) 60 18 113/76 96 % 20 0635 98.2 °F (36.8 °C) 64 18 136/61 92 % 20 0403 98.1 °F (36.7 °C) 76 18 113/66 96 % 20 0046 98.5 °F (36.9 °C) 87 16 116/71  05/07/20 2254 98.5 °F (36.9 °C) 87  116/71 95 % 05/07/20 1924 96.1 °F (35.6 °C) 62  120/81 96 % 05/07/20 1516 96.5 °F (35.8 °C) 87  148/87 95 % Oxygen Therapy O2 Sat (%): 96 % (05/08/20 1137) Pulse via Oximetry: 41 beats per minute (05/06/20 1138) O2 Device: Nasal cannula (05/06/20 1138) O2 Flow Rate (L/min): 2 l/min (05/06/20 1138) Intake/Output Summary (Last 24 hours) at 5/8/2020 1417 Last data filed at 5/8/2020 1256 Gross per 24 hour Intake 720 ml Output  Net 720 ml General:    Frail elderly female in no obvious cardiopulmonary distress, nasal cannula out of her nose; bruising in the upper chest wall; alert. CV:   RRR. No murmur, rub, or gallop. Lungs:   CTAB. No wheezing, rhonchi, or rales. Abdomen:   Soft, nontender, nondistended. Extremities: Warm and dry. No cyanosis or edema. Skin:     No rashes or jaundice. Data Review: 
I have reviewed all labs, meds, telemetry events, and studies from the last 24 hours. Recent Results (from the past 24 hour(s)) CBC WITH AUTOMATED DIFF Collection Time: 05/07/20  6:44 PM  
Result Value Ref Range WBC 7.2 4.3 - 11.1 K/uL  
 RBC 3.15 (L) 4.05 - 5.2 M/uL  
 HGB 11.1 (L) 11.7 - 15.4 g/dL HCT 34.8 (L) 35.8 - 46.3 % .5 (H) 79.6 - 97.8 FL  
 MCH 35.2 (H) 26.1 - 32.9 PG  
 MCHC 31.9 31.4 - 35.0 g/dL  
 RDW 14.5 11.9 - 14.6 % PLATELET 421 587 - 323 K/uL MPV 12.4 (H) 9.4 - 12.3 FL ABSOLUTE NRBC 0.02 0.0 - 0.2 K/uL  
 DF AUTOMATED NEUTROPHILS 78 43 - 78 % LYMPHOCYTES 9 (L) 13 - 44 % MONOCYTES 12 4.0 - 12.0 % EOSINOPHILS 0 (L) 0.5 - 7.8 % BASOPHILS 0 0.0 - 2.0 % IMMATURE GRANULOCYTES 0 0.0 - 5.0 %  
 ABS. NEUTROPHILS 5.6 1.7 - 8.2 K/UL  
 ABS. LYMPHOCYTES 0.6 0.5 - 4.6 K/UL  
 ABS. MONOCYTES 0.9 0.1 - 1.3 K/UL  
 ABS. EOSINOPHILS 0.0 0.0 - 0.8 K/UL  
 ABS. BASOPHILS 0.0 0.0 - 0.2 K/UL  
 ABS. IMM. GRANS. 0.0 0.0 - 0.5 K/UL METABOLIC PANEL, BASIC  
 Collection Time: 05/07/20  6:44 PM  
Result Value Ref Range Sodium 147 (H) 136 - 145 mmol/L Potassium 3.7 3.5 - 5.1 mmol/L Chloride 109 (H) 98 - 107 mmol/L  
 CO2 26 21 - 32 mmol/L Anion gap 12 7 - 16 mmol/L Glucose 110 (H) 65 - 100 mg/dL BUN 44 (H) 8 - 23 MG/DL Creatinine 0.73 0.6 - 1.0 MG/DL  
 GFR est AA >60 >60 ml/min/1.73m2 GFR est non-AA >60 >60 ml/min/1.73m2 Calcium 9.1 8.3 - 10.4 MG/DL All Micro Results Procedure Component Value Units Date/Time EMERGENT DISEASE PANEL [[id number]] Collected:  05/07/20 1849 Order Status:  Completed Updated:  05/07/20 2018 Current Meds: 
Current Facility-Administered Medications Medication Dose Route Frequency  lactated Ringers infusion  75 mL/hr IntraVENous CONTINUOUS  
 metoprolol (LOPRESSOR) injection 5 mg  5 mg IntraVENous Q6H PRN  
 [Held by provider] divalproex (DEPAKOTE SPRINKLE) capsule 125 mg  125 mg Oral TID  ibuprofen (MOTRIN) tablet 400 mg  400 mg Oral Q6H PRN  
 famotidine (PEPCID) tablet 20 mg  20 mg Oral DAILY  amLODIPine (NORVASC) tablet 5 mg  5 mg Oral DAILY  aspirin chewable tablet 81 mg  81 mg Oral DAILY  brimonidine (ALPHAGAN) 0.2 % ophthalmic solution 1 Drop  1 Drop Right Eye Q8H  
 calcium-vitamin D (OS-JERICHO) 500 mg-200 unit tablet  1 Tab Oral BID  dorzolamide-timoloL (COSOPT) 22.3-6.8 mg/mL ophthalmic solution 1 Drop  1 Drop Both Eyes BID  pantoprazole (PROTONIX) tablet 40 mg  40 mg Oral ACB  fluticasone propionate (FLONASE) 50 mcg/actuation nasal spray 2 Spray  2 Spray Both Nostrils DAILY  levothyroxine (SYNTHROID) tablet 50 mcg  50 mcg Oral ACB  [Held by provider] lubiPROStone (AMITIZA) capsule 8 mcg (Patient Supplied)  8 mcg Oral DAILY  simethicone (MYLICON) tablet 316 mg  120 mg Oral Q6H PRN  
 atorvastatin (LIPITOR) tablet 20 mg  20 mg Oral QHS  sodium chloride (NS) flush 5-40 mL  5-40 mL IntraVENous Q8H  
  sodium chloride (NS) flush 5-40 mL  5-40 mL IntraVENous PRN  
 acetaminophen (TYLENOL) tablet 650 mg  650 mg Oral Q4H PRN  
 naloxone (NARCAN) injection 0.4 mg  0.4 mg IntraVENous PRN  
 ondansetron (ZOFRAN) injection 4 mg  4 mg IntraVENous Q4H PRN Other Studies (last 24 hours): No results found. Assessment and Plan:  
 
Hospital Problems as of 5/8/2020 Date Reviewed: 3/13/2019 Codes Class Noted - Resolved POA Debility ICD-10-CM: R53.81 ICD-9-CM: 799.3  5/1/2020 - Present Unknown Physical deconditioning ICD-10-CM: R53.81 ICD-9-CM: 799.3  5/1/2020 - Present Unknown Closed left scapular fracture ICD-10-CM: P98.633X ICD-9-CM: 811.00  5/1/2020 - Present Unknown * (Principal) Recurrent falls ICD-10-CM: R29.6 ICD-9-CM: V15.88  5/1/2020 - Present Unknown Fracture of one rib, left side, initial encounter for closed fracture ICD-10-CM: S22.32XA ICD-9-CM: 807.01  5/1/2020 - Present Unknown At risk for unsafe behavior ICD-10-CM: Z91.89 ICD-9-CM: V15.89  5/1/2020 - Present Unknown Wound of right leg ICD-10-CM: W07.947L ICD-9-CM: 891.0  1/22/2019 - Present Yes CN VI palsy (Chronic) ICD-10-CM: H49.20 ICD-9-CM: 378.54  1/3/2013 - Present Yes Overview Signed 1/3/2013  9:03 AM by Luz Elena Cullen MD  
  Spring 2012- Dr. Johnna Abarca Macular degeneration (Chronic) ICD-10-CM: H35.30 ICD-9-CM: 362.50  1/3/2013 - Present Unknown Overview Signed 1/3/2013  9:05 AM by MD Dr. Johnna Antoine Glaucoma (Chronic) ICD-10-CM: H40.9 ICD-9-CM: 365.9  1/3/2013 - Present Yes Overview Signed 1/3/2013  9:05 AM by MD Dr. Johnna Antoine Hearing loss, sensorineural (Chronic) ICD-10-CM: H90.5 ICD-9-CM: 389.10  1/3/2013 - Present Yes Senile osteoporosis (Chronic) ICD-10-CM: M81.0 ICD-9-CM: 733.01  10/31/2012 - Present Yes Overview Signed 1/3/2013  9:04 AM by MD Dr. Margret Antoine Hyperlipidemia (Chronic) ICD-10-CM: G35.5 ICD-9-CM: 272.4  10/31/2012 - Present Yes Hypertension (Chronic) ICD-10-CM: I10 
ICD-9-CM: 401.9  10/31/2012 - Present Yes Hypothyroidism (Chronic) ICD-10-CM: E03.9 ICD-9-CM: 244.9  10/31/2012 - Present Yes PLAN:   
· Patient seen by speech and okayed for mechanical soft thin liquid diet · CT chest was done and  it showed a comminuted scapular tip fracture but no rib fractures · Right scapular fracture will continue PRN pain meds; narcotics were confusing her and these have been held; so will continue motrin and tylenol for pain and stop narcotics · Elevated blood pressure resolved · Encephalopathy- multifactorial from meds and new surroundings and dementia- supportive care; no evidence of a stroke on her MRI 
· Continue appropriate home medications · Continue supportive care · Plan is  for placement to rehab-she has a bed at [facility]; will send her when medically stable in terms of her mental status and ability to void-she may have to leave with a [facility] DC planning/Dispo:  rehab placement when stable-  hopefully tomorrow or Saturday; power of  is Mr. family at [phone]. Family was updated DVT ppx: SCDs Signed: 
Corky Luz MD

## 2020-05-08 NOTE — PROGRESS NOTES
Pt hasnt voided from the beginning of this shift, bladder scanned at midnight with 127 cc, was only able to give 240 cc water with meds. No IV fluids ordered. Bladder scanned at 0430 with 285 cc, sent message to MD with new orders made and carried out. Pt remains alert.

## 2020-05-08 NOTE — PROGRESS NOTES
Problem: Mobility Impaired (Adult and Pediatric) Goal: *Acute Goals and Plan of Care (Insert Text) Description: LTG: 
(1.)Ms. Salena Gee will move from supine to sit and sit to supine  in bed with MODERATE ASSIST within 7 treatment day(s). (2.)Ms. Salena Gee will transfer from bed to chair and chair to bed with MODERATE ASSIST using the least restrictive device within 7 treatment day(s). (3.)Ms. Salena Gee will ambulate with MODERATE ASSIST for 10 feet with the least restrictive device within 7 treatment day(s). (4)Ms. Salena Gee will perform HEP with cues and assistance to increase safety on his feet in 7 days. ________________________________________________________________________________________________ Outcome: Progressing Towards Goal 
  
PHYSICAL THERAPY: Daily Note and AM 5/8/2020 INPATIENT: PT Visit Days : 5 Payor: Corey Durand MMP / Plan: [place] [employer] MMP / Product Type: Managed Care Medicare /   
  
NAME/AGE/GENDER: Gregg Mai is a 80 y.o. female PRIMARY DIAGNOSIS: Recurrent falls [R29.6] Macular degeneration [H35.30] At risk for unsafe behavior [Z91.89] Fracture of one rib, left side, initial encounter for closed fracture [S22.32XA] Closed left scapular fracture [S42.102A] Debility [R53.81] Physical deconditioning [R53.81] Recurrent falls Recurrent falls ICD-10: Treatment Diagnosis:  
 · Generalized Muscle Weakness (M62.81) · Other abnormalities of gait and mobility (R26.89) · History of falling (Z91.81) Precaution/Allergies: 
Patient has no known allergies. ASSESSMENT:  
 
Ms. Salena Gee presents with significant debility and very [place]. Patient was supine upon contact and agreeable to PT. Patient is hypersensitive to touch due to generalized pain. She presents with several bruises due to frequent falls. Patient needs total assist x 2 to supine to sit.  Once seated EOB patient works on sitting tolerance, sitting balance, posture, pulmonary health, and activity tolerance. Patient demonstrates mostly fair sitting balance but would occasional lean down on her elbow to rest. Patient returns to supine with total assist x 2 where she was positioned for comfort. Once supine patient was attempting to get up and \"go to the hospital\". Attempted to reorient patient without success. Overall slow progress towards physical therapy goals. Patient's goals listed above are still appropriate. Will continue skilled PT to address remaining deficits. This section established at most recent assessment PROBLEM LIST (Impairments causing functional limitations): 1. Decreased Strength 2. Decreased ADL/Functional Activities 3. Decreased Transfer Abilities 4. Decreased Ambulation Ability/Technique 5. Decreased Balance 6. Increased Pain 7. Decreased Activity Tolerance 8. Increased Fatigue 9. Decreased Flexibility/Joint Mobility 10. Decreased Knowledge of Precautions 11. Decreased [place] with Home Exercise Program 
 INTERVENTIONS PLANNED: (Benefits and precautions of physical therapy have been discussed with the patient.) 1. Balance Exercise 2. Bed Mobility 3. Gait Training 4. Home Exercise Program (HEP) 5. Range of Motion (ROM) 6. Therapeutic Activites 7. Therapeutic Exercise/Strengthening 8. Transfer Training TREATMENT PLAN: Frequency/Duration: daily for duration of hospital stay Rehabilitation Potential For Stated Goals: Fair REHAB RECOMMENDATIONS (at time of discharge pending progress):   
Placement: 
snf 
Equipment:  
? None at this time HISTORY:  
History of Present Injury/Illness (Reason for Referral): 
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  
Pt lives by herself, she was found down by her neighbor today around 56 hrs. As per pt, she fell around 1000 hrs this AM. Pt reports recurrent falls, lives by herself. She reports of multiple bruises all over her body, and she reportedly hit her head today. She c/o mild to moderate pain diffusely throughout her body joints. She denies fever, nausea/vomiting, chest pain, headache, weakness/numbness/tingling in extremities, diarrhea, urinary symptoms. ER physician attempted to make pt walk, but she is not able to stand up, needed moderate 2 ppl assistance and was noted to be unsteady. ER work up showed an unremarkable CT head, suspected left scapular tip fracture with left 6th rib fracture. Given pt is at high risk of falls and is unsafe being alone at home, and pain management, pt will admitted for PT/OT eval and placement. Past Medical History/Comorbidities: Ms. Joby Benson  has a past medical history of CN VI palsy (1/3/2013), Constipation, Fatigue (10/31/2012), GERD (gastroesophageal reflux disease), Glaucoma (1/3/2013), Hearing loss, sensorineural (1/3/2013), History of subdural hematoma (post traumatic), Hypercholesterolemia, Hyperlipemia (10/31/2012), Hyperlipidemia (10/31/2012), Hypertension (10/31/2012), Macular degeneration (1/3/2013), Mitral valve disorders(424.0) (10/31/2012), Osteoarthrosis, unspecified whether generalized or localized, lower leg (3/13/2013), Other malaise and fatigue (10/31/2012), Senile osteoporosis (10/31/2012), Skin cancer of scalp (2012), TIA (transient ischemic attack), and Unspecified hypothyroidism (10/31/2012). Ms. Joby Benson  has a past surgical history that includes pr rectal sensation test, balloon; hx appendectomy; hx colonoscopy; and hx malignant skin lesion excision. Social History/Living Environment:  
Home Environment: Private residence Current DME Used/Available at Home: ricardo Montes Prior Level of Function/Work/Activity: 
Falling, rollator, lives alone Number of Personal Factors/Comorbidities that affect the Plan of Care: 3+: HIGH COMPLEXITY EXAMINATION:  
Most Recent Physical Functioning:  
Gross Assessment: 
  
         
  
Posture: 
  
Balance: 
Sitting: Impaired Sitting - Static: Fair (occasional) Sitting - Dynamic: Fair (occasional) Bed Mobility: 
Rolling: Total assistance Supine to Sit: Total assistance;Assist x2 Sit to Supine: Total assistance;Assist x2 Wheelchair Mobility: 
  
Transfers: 
  
Gait:unable Body Structures Involved: 1. Bones 2. Joints 3. Muscles 4. Ligaments Body Functions Affected: 1. Movement Related Activities and Participation Affected: 1. Mobility Number of elements that affect the Plan of Care: 3: MODERATE COMPLEXITY CLINICAL PRESENTATION:  
Presentation: Stable and uncomplicated: LOW COMPLEXITY CLINICAL DECISION MAKING:  
[employer] AM-PAC 6 Clicks Basic Mobility Inpatient Short Form How much difficulty does the patient currently have. .. Unable A Lot A Little None 1. Turning over in bed (including adjusting bedclothes, sheets and blankets)? [] 1   [x] 2   [] 3   [] 4  
2. Sitting down on and standing up from a chair with arms ( e.g., wheelchair, bedside commode, etc.)   [] 1   [x] 2   [] 3   [] 4  
3. Moving from lying on back to sitting on the side of the bed? [] 1   [x] 2   [] 3   [] 4 How much help from another person does the patient currently need. .. Total A Lot A Little None 4. Moving to and from a bed to a chair (including a wheelchair)? [] 1   [x] 2   [] 3   [] 4  
5. Need to walk in hospital room? [x] 1   [] 2   [] 3   [] 4  
6. Climbing 3-5 steps with a railing? [x] 1   [] 2   [] 3   [] 4  
© 2007, Trustees of [employer], under license to [employer]. All rights reserved Score:  Initial: 10 Most Recent: X (Date: -- ) Interpretation of Tool:  Represents activities that are increasingly more difficult (i.e. Bed mobility, Transfers, Gait).  
 
Medical Necessity:    
· Patient is expected to demonstrate progress in  
 · strength, range of motion, and balance ·  to  
· decrease assistance required with exercises and functional mobility · . Reason for Services/Other Comments: 
· Patient · continues to require present interventions due to patient's inability to perform exercises and functional mobility independently · . Use of outcome tool(s) and clinical judgement create a POC that gives a: Questionable prediction of patient's progress: MODERATE COMPLEXITY  
  
 
 
 
TREATMENT:  
(In addition to Assessment/Re-Assessment sessions the following treatments were rendered) Pre-treatment Symptoms/Complaints:   
Pain: Initial:  
Pain Intensity 1: 0  Post Session:  0 post treatment supine Therapeutic Activity: (    23 Minutes): Therapeutic activities including bed mobility training, static/dynamic sitting balance, activity tolerance activities, posture training, pulmonary health (encouraged coughing to manage secretions) scooting, command following, and patient education to improve mobility, strength, and balance. Required maximal to moderate cues   to promote static and dynamic balance in sitting, promote coordination of bilateral, lower extremity(s) and promote motor control of trunk in sitting. Braces/Orthotics/Lines/Etc:  
· IV 
· O2 Device: Nasal cannula Treatment/Session Assessment:   
· Response to Treatment:  See above · Interdisciplinary Collaboration:  
o Physical Therapy Assistant 
o Registered Nurse 
o Rehabilitation Attendant · After treatment position/precautions:  
o Supine in bed 
o Bed alarm/tab alert on 
o Bed/Chair-wheels locked 
o Bed in low position 
o Call light within reach 
o RN notified · Compliance with Program/Exercises: Will assess as treatment progresses · Recommendations/Intent for next treatment session: \"Next visit will focus on reduction in assistance provided\". Total Treatment Duration: PT Patient Time In/Time Out Time In: 3663 Time Out: 1104 Lon Faustin, PTA

## 2020-05-08 NOTE — PROGRESS NOTES
CM contacted patient's relative Yvonne Hopper [phone], to confirm the patient will not discharge to SNF this day. Tamar Huber was receptive. CM informed Tamar Huber MD will provide updates when available. SUZIE continues to follow the patient.

## 2020-05-09 NOTE — PROGRESS NOTES
Patient's IV line removed.   Transported with [facility] ambulance with 2 L NC.

## 2020-05-09 NOTE — PROGRESS NOTES
Problem: Falls - Risk of 
Goal: *Absence of Falls Description: Document Brant Staley Fall Risk and appropriate interventions in the flowsheet. Outcome: Progressing Towards Goal 
Note: Fall Risk Interventions: 
Mobility Interventions: Assess mobility with egress test 
 
Mentation Interventions: Door open when patient unattended Medication Interventions: Bed/chair exit alarm Elimination Interventions: Call light in reach History of Falls Interventions: Door open when patient unattended Problem: Patient Education: Go to Patient Education Activity Goal: Patient/Family Education Outcome: Progressing Towards Goal 
  
Problem: Pressure Injury - Risk of 
Goal: *Prevention of pressure injury Description: Document Dontae Scale and appropriate interventions in the flowsheet. Outcome: Progressing Towards Goal 
Note: Pressure Injury Interventions: 
Sensory Interventions: Assess changes in LOC Moisture Interventions: Absorbent underpads Activity Interventions: Pressure redistribution bed/mattress(bed type) Mobility Interventions: Pressure redistribution bed/mattress (bed type) Nutrition Interventions: Offer support with meals,snacks and hydration Friction and Shear Interventions: Apply protective barrier, creams and emollients Problem: Patient Education: Go to Patient Education Activity Goal: Patient/Family Education Outcome: Progressing Towards Goal

## 2020-05-09 NOTE — PROGRESS NOTES
TRANSFER - OUT REPORT: 
 
Verbal report given to [facility] (name) on Sarah Tran  being transferred to [address] [place] [address],[address]) for routine progression of care Report consisted of patients Situation, Background, Assessment and  
Recommendations(SBAR). Information from the following report(s) SBAR was reviewed with the receiving nurse. Lines:  
Peripheral IV 05/01/20 Left Forearm (Active) Site Assessment Clean, dry, & intact 5/9/2020 11:35 AM  
Phlebitis Assessment 0 5/9/2020 11:35 AM  
Infiltration Assessment 0 5/9/2020 11:35 AM  
Dressing Status Clean, dry, & intact 5/9/2020 11:35 AM  
Dressing Type Transparent 5/9/2020 11:35 AM  
Hub Color/Line Status Patent; Infusing 5/9/2020 11:35 AM  
Alcohol Cap Used No 5/9/2020  2:30 AM  
  
 
Opportunity for questions and clarification was provided. Patient transported with: 
 O2 @ 2 liters Nurse requested we inform facility of COVID-19 test results when these are received.

## 2020-05-09 NOTE — PROGRESS NOTES
Patient will be d/c to [facility]. Patient / family agreeable to d/c to [facility]. CM made contact with Christi and informed her that patient was being d/c today. Mamie Jason will met patient at [facility]. CM provide Christi with patient room#210/contact #. CM will continue to monitor. Care Management Interventions PCP Verified by CM: Yes Mode of Transport at Discharge: BLS Transition of Care Consult (CM Consult): Discharge Planning Discharge Durable Medical Equipment: No(Rolling walker, elevated toliet seat. ) Physical Therapy Consult: Yes Occupational Therapy Consult: Yes Speech Therapy Consult: Yes Current Support Network: Lives Alone, Own Home(The patient lives alone in a one level home.) Confirm Follow Up Transport: Other (see comment)(Patient's neighbor provides transportation to [facility]. Patient no longer drives. ) The Plan for Transition of Care is Related to the Following Treatment Goals : Patient to obtain care to become medically stable and to discharge to a SNF to improve physical mobility. The Patient and/or Patient Representative was Provided with a Choice of Provider and Agrees with the Discharge Plan?: Yes Name of the Patient Representative Who was Provided with a Choice of Provider and Agrees with the Discharge Plan: [facility] of Choice List was Provided with Basic Dialogue that Supports the Patient's Individualized Plan of Care/Goals, Treatment Preferences and Shares the Quality Data Associated with the Providers?: Yes [employer] Information Provided?: No 
Discharge Location Discharge Placement: Skilled nursing facility

## 2020-05-09 NOTE — PROGRESS NOTES
Hourly rounding was performed on pt. No complaints of pain, nausea,vomiting. Bed is low, locked, call bell within reach. All needs met this shift. Will continue to monitor until next RN comes on. Date 05/08/20 0700 - 05/09/20 0948 05/09/20 0700 - 05/10/20 6805 Shift 0700-1859 8640-8173 24 Hour Total 8330-3098 7735-8485 24 Hour Total  
INTAKE  
P.O. 720  720 P. O. 720  720     
I. V.(mL/kg/hr)  743.3 743.3 I.V.  743.3 743.3 Other  100 100 Irrigation Volume Input (mL) (Urinary Catheter 05/08/20 2- way; [facility])  100 100 Shift Total(mL/kg) 967(22.0) 843. 3(15.5) 1563. 3(28.8) OUTPUT Urine(mL/kg/hr) 500(0.8) 100 600 Urine Output (mL) (Urinary Catheter 05/08/20 2- way; [facility]) 500 100 600 Shift Total(mL/kg) 500(9.2) 100(1.8) 600(11)  743.3 963.3 Weight (kg) 54.3 54.3 54.3 54.3 54.3 54.3

## 2020-05-09 NOTE — DISCHARGE SUMMARY
Hospitalist Discharge Summary Admit Date:  2020 12:37 PM  
Name:  Sunita Freed Age:  80 y.o. 
:  1923 MRN:  [id number] PCP:  Portia Quiroz MD 
Treatment Team: Attending Provider: Jennifer Addison MD; Charge Nurse: Yas Nation; Utilization Review: Mu Kin; Care Manager: Isabell Baker; Charge Nurse: Adilia Gamble Primary Nurse: Tomeka Quinn RN 
 
Problem List for this Hospitalization: 
Hospital Problems as of 2020 Date Reviewed: 3/13/2019 Codes Class Noted - Resolved POA Debility ICD-10-CM: R53.81 ICD-9-CM: 799.3  2020 - Present Unknown Physical deconditioning ICD-10-CM: R53.81 ICD-9-CM: 799.3  2020 - Present Unknown Closed left scapular fracture ICD-10-CM: J75.739X ICD-9-CM: 811.00  2020 - Present Unknown * (Principal) Recurrent falls ICD-10-CM: R29.6 ICD-9-CM: V15.88  2020 - Present Unknown Fracture of one rib, left side, initial encounter for closed fracture ICD-10-CM: S22.32XA ICD-9-CM: 807.01  2020 - Present Unknown At risk for unsafe behavior ICD-10-CM: Z91.89 ICD-9-CM: V15.89  2020 - Present Unknown Wound of right leg ICD-10-CM: Q98.101A ICD-9-CM: 891.0  2019 - Present Yes CN VI palsy (Chronic) ICD-10-CM: H49.20 ICD-9-CM: 378.54  1/3/2013 - Present Yes Overview Signed 1/3/2013  9:03 AM by Portia Quiroz MD  
  Spring 2012- Dr. Lakeisha Izquierdo Macular degeneration (Chronic) ICD-10-CM: H35.30 ICD-9-CM: 362.50  1/3/2013 - Present Unknown Overview Signed 1/3/2013  9:05 AM by MD Dr. Lakeisha Garcia Glaucoma (Chronic) ICD-10-CM: H40.9 ICD-9-CM: 365.9  1/3/2013 - Present Yes Overview Signed 1/3/2013  9:05 AM by MD Dr. Lakeisha Garcia Hearing loss, sensorineural (Chronic) ICD-10-CM: H90.5 ICD-9-CM: 389.10  1/3/2013 - Present Yes Senile osteoporosis (Chronic) ICD-10-CM: M81.0 ICD-9-CM: 733.01  10/31/2012 - Present Yes Overview Signed 1/3/2013  9:04 AM by MD Dr. Kellee Schuster Hyperlipidemia (Chronic) ICD-10-CM: Y39.6 ICD-9-CM: 272.4  10/31/2012 - Present Yes Hypertension (Chronic) ICD-10-CM: I10 
ICD-9-CM: 401.9  10/31/2012 - Present Yes Hypothyroidism (Chronic) ICD-10-CM: E03.9 ICD-9-CM: 244.9  10/31/2012 - Present Yes Admission HPI from 5/1/2020:   
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 Pt lives by herself, she was found down by her neighbor today around 1300 hrs. As per pt, she fell around 1000 hrs this AM. Pt reports recurrent falls, lives by herself. She reports of multiple bruises all over her body, and she reportedly hit her head today. She c/o mild to moderate pain diffusely throughout her body joints. She denies fever, nausea/vomiting, chest pain, headache, weakness/numbness/tingling in extremities, diarrhea, urinary symptoms. ER physician attempted to make pt walk, but she is not able to stand up, needed moderate 2 ppl assistance and was noted to be unsteady. ER work up showed an unremarkable CT head, suspected left scapular tip fracture with left 6th rib fracture. Given pt is at high risk of falls and is unsafe being alone at home, and pain management, pt will admitted for PT/OT eval and placement. \" 
 
Hospital Course: The patient was admitted and started on narcotic pain medications. The narcotic pain meds did result in some encephalopathy and confusion. She became more agitated and had to be moved closer to the nursing station and then given sedating meds . Narcotic meds were discontinued. CT chest revealed only a broken scapula but no broken ribs.  She was noted to have rt sided facial drooping so mri done which ruled out an acute cva. She is stable for discharge today. She did have difficulty voiding after her initial grossman was removed, This had to be replaced and I would suggest bladder training then removal of her grossman at rehab. Follow up instructions below. Plan was discussed with the patient and the IDT. All questions answered. Patient was stable at time of discharge and was instructed to call or return if there are any concerns or recurrence of symptoms. Diagnostic Imaging/Tests:  
Xr Shoulder Lt Ap/lat Min 2 V Result Date: 5/1/2020 Clinical History: The patient is a 80years year old Female presenting with symptoms of L shoulder pain after fall. Comparison:  Left humerus films 5/1/2020 Findings: 3 views of the left shoulder were obtained. There is slight deformity of the left posterior lateral sixth rib. In addition there is suggestion of a fracture of the scapular tip. Diffuse osteopenia otherwise limits evaluation. Joint spaces are well-maintained. The visualized lung field is unremarkable. Calcification is seen in the left lateral chest wall/breast soft tissues Impression: 1. Suspected scapular tip fracture with questionable additional injury involving the left posterior lateral sixth rib. Further evaluation with a chest CT should be considered. Xr Humerus Lt Addendum Date: 5/1/2020 Addendum: Addendum: There is suggestion of a fracture involving the scapular tip. Further evaluation with chest CT is recommended. Result Date: 5/1/2020 Clinical History: The patient is a 80years year old Female presenting with symptoms of L humerus pain after fall. Comparison:  none Findings: 2 views of the left humerus were obtained. No fracture or dislocation is identified. There is diffuse osteopenia. Degenerative changes are suggested throughout the elbow. Impression: No acute osseous or joint abnormalities. Ct Head Wo Cont Result Date: 5/1/2020 EXAM: CT HEAD WITHOUT CONTRAST INDICATION: Found down. Head trauma. COMPARISON: Head CT 12/14/2018 TECHNIQUE: Contiguous axial images were obtained from the skull base through the vertex without IV contrast. Radiation dose reduction techniques were used for this study. Our CT scanners use one or all of the following: Automated exposure control, adjustment of the mA and/or kV according to patient size, iterative reconstruction. FINDINGS: Global parenchymal volume loss and ex vacuo ventriculomegaly. Periventricular white matter hypoattenuation reflects sequelae of small vessel ischemic disease. No acute infarction or hemorrhage is evident. No hydrocephalus or midline shift. No extra-axial mass or hemorrhage. The basal cisterns are patent. The visualized portions of the orbits are normal. The mastoid air cells and paranasal sinuses are patent. The visualized vascular structures have an unremarkable noncontrast appearance. The calvarium and soft tissues appear normal.  
 
IMPRESSION: No acute intracranial abnormality. Xr Knee Lt 3 V Result Date: 5/1/2020 Exam:  Bilateral knee radiographs History:  pain, Pain, 89 years Female Comparison: Bilateral knee radiographs December 14, 2018 Findings: Persistent moderate lateral compartment joint space narrowing of the left knee and medial compartment of the right knee. No evidence of acute fracture or dislocation. Normal alignment. Persistent diffuse osteopenia. Mild patellar marginal spurring unchanged. Persistent chondrocalcinosis. Visualized soft tissues otherwise unremarkable. Impression:  No evidence of acute injury. Other chronic findings as above. Xr Knee Rt 3 V Result Date: 5/1/2020 Exam:  Bilateral knee radiographs History:  pain, Pain, 89 years Female Comparison: Bilateral knee radiographs December 14, 2018 Findings: Persistent moderate lateral compartment joint space narrowing of the left knee and medial compartment of the right knee. No evidence of acute fracture or dislocation. Normal alignment. Persistent diffuse osteopenia. Mild patellar marginal spurring unchanged. Persistent chondrocalcinosis. Visualized soft tissues otherwise unremarkable. Impression:  No evidence of acute injury. Other chronic findings as above. Echocardiogram results: No results found for this visit on 05/01/20. All Micro Results Procedure Component Value Units Date/Time EMERGENT DISEASE PANEL [[id number]] Collected:  05/07/20 1849 Order Status:  Completed Updated:  05/07/20 2018 Labs: Results:  
   
BMP, Mg, Phos Recent Labs 05/09/20 
0825 05/07/20 
1844 * 147* K 3.8 3.7 * 109* CO2 31 26 AGAP 5* 12 BUN 37* 44* CREA 0.60 0.73 CA 8.3 9.1 * 110* CBC Recent Labs 05/09/20 
0825 05/07/20 
1844 WBC 6.9 7.2 RBC 2.90* 3.15* HGB 10.3* 11.1*  
HCT 32.3* 34.8*  
 189 GRANS 76 78 LYMPH 12* 9* EOS 0* 0*  
MONOS 10 12 BASOS 0 0 IG 1 0 ANEU 5.2 5.6 ABL 0.9 0.6 SEAMUS 0.0 0.0 ABM 0.7 0.9 ABB 0.0 0.0 AIG 0.1 0.0  
  
LFT No results for input(s): SGOT, ALT, TBIL, AP, TP, ALB, GLOB, AGRAT, GPT in the last 72 hours. Cardiac Testing Lab Results Component Value Date/Time  05/01/2020 01:40 PM  
  
Coagulation Tests No results found for: PTP, INR, APTT, INREXT A1c No results found for: HBA1C, HGBE8, [id number] Lipid Panel Lab Results Component Value Date/Time Cholesterol, total 120 06/12/2019 02:59 PM  
 HDL Cholesterol 52 06/12/2019 02:59 PM  
 LDL, calculated 58 06/12/2019 02:59 PM  
 VLDL, calculated 10 06/12/2019 02:59 PM  
 Triglyceride 48 06/12/2019 02:59 PM  
 CHOL/HDL Ratio 2.6 10/12/2016 12:49 PM  
  
Thyroid Panel Lab Results Component Value Date/Time TSH 1.180 06/12/2019 02:59 PM  
 TSH 1.570 06/07/2018 11:56 AM  
    
Most Recent UA Lab Results Component Value Date/Time Color YELLOW 05/02/2020 02:17 PM  
 Appearance CLEAR 05/02/2020 02:17 PM  
 Specific gravity 1.011 05/02/2020 02:17 PM  
 pH (UA) 6.5 05/02/2020 02:17 PM  
 Protein TRACE (A) 05/02/2020 02:17 PM  
 Glucose Negative 05/02/2020 02:17 PM  
 Ketone Negative 05/02/2020 02:17 PM  
 Bilirubin Negative 05/02/2020 02:17 PM  
 Blood Negative 05/02/2020 02:17 PM  
 Urobilinogen 1.0 05/02/2020 02:17 PM  
 Nitrites Negative 05/02/2020 02:17 PM  
 Leukocyte Esterase Negative 05/02/2020 02:17 PM  
  
 
No Known Allergies Immunization History Administered Date(s) Administered  Influenza High Dose Vaccine PF 10/12/2016, 11/29/2017, 12/05/2018  Influenza Vaccine 10/17/2013, 11/12/2014  Influenza Vaccine (Quad) PF 12/11/2019  Influenza Vaccine PF 09/10/2015  Influenza Vaccine Whole 09/06/2012  Pneumococcal Conjugate (PCV-13) 02/11/2015  Pneumococcal Polysaccharide (PPSV-23) 01/01/1993  TB Skin Test (PPD) Intradermal 05/01/2020  TDAP Vaccine 05/31/2012  Tdap 05/31/2012, 06/29/2016  Zoster Vaccine, Live 11/13/2014 All Labs from Last 24 Hrs: 
Recent Results (from the past 24 hour(s)) METABOLIC PANEL, BASIC Collection Time: 05/09/20  8:25 AM  
Result Value Ref Range Sodium 147 (H) 136 - 145 mmol/L Potassium 3.8 3.5 - 5.1 mmol/L Chloride 111 (H) 98 - 107 mmol/L  
 CO2 31 21 - 32 mmol/L Anion gap 5 (L) 7 - 16 mmol/L Glucose 110 (H) 65 - 100 mg/dL BUN 37 (H) 8 - 23 MG/DL Creatinine 0.60 0.6 - 1.0 MG/DL  
 GFR est AA >60 >60 ml/min/1.73m2 GFR est non-AA >60 >60 ml/min/1.73m2 Calcium 8.3 8.3 - 10.4 MG/DL  
CBC WITH AUTOMATED DIFF Collection Time: 05/09/20  8:25 AM  
Result Value Ref Range WBC 6.9 4.3 - 11.1 K/uL  
 RBC 2.90 (L) 4.05 - 5.2 M/uL  
 HGB 10.3 (L) 11.7 - 15.4 g/dL HCT 32.3 (L) 35.8 - 46.3 % .4 (H) 79.6 - 97.8 FL  
 MCH 35.5 (H) 26.1 - 32.9 PG  
 MCHC 31.9 31.4 - 35.0 g/dL  
 RDW 14.7 (H) 11.9 - 14.6 % PLATELET 576 016 - 436 K/uL MPV 12.1 9.4 - 12.3 FL ABSOLUTE NRBC 0.00 0.0 - 0.2 K/uL  
 DF AUTOMATED NEUTROPHILS 76 43 - 78 % LYMPHOCYTES 12 (L) 13 - 44 % MONOCYTES 10 4.0 - 12.0 % EOSINOPHILS 0 (L) 0.5 - 7.8 % BASOPHILS 0 0.0 - 2.0 % IMMATURE GRANULOCYTES 1 0.0 - 5.0 %  
 ABS. NEUTROPHILS 5.2 1.7 - 8.2 K/UL  
 ABS. LYMPHOCYTES 0.9 0.5 - 4.6 K/UL  
 ABS. MONOCYTES 0.7 0.1 - 1.3 K/UL  
 ABS. EOSINOPHILS 0.0 0.0 - 0.8 K/UL  
 ABS. BASOPHILS 0.0 0.0 - 0.2 K/UL  
 ABS. IMM. GRANS. 0.1 0.0 - 0.5 K/UL Discharge Exam: 
Patient Vitals for the past 24 hrs: 
 Temp Pulse Resp BP SpO2  
05/09/20 0816 98.4 °F (36.9 °C) 81 18 124/72 95 % 05/09/20 0447 98.3 °F (36.8 °C) 66 17 117/68 96 % 05/09/20 0028 98.3 °F (36.8 °C) 69 18 123/63 92 % 05/08/20 1951 97.7 °F (36.5 °C) 75 17 122/59 95 % 05/08/20 1640     95 % 05/08/20 1555 98.1 °F (36.7 °C) 67 18 120/71 95 % 05/08/20 1137 98 °F (36.7 °C) 60 18 113/76 96 % Oxygen Therapy O2 Sat (%): 95 % (05/09/20 0816) Pulse via Oximetry: 41 beats per minute (05/06/20 1138) O2 Device: Nasal cannula (05/08/20 1640) O2 Flow Rate (L/min): 3 l/min(reduced to 2l) (05/08/20 1640) Intake/Output Summary (Last 24 hours) at 5/9/2020 7650 Last data filed at 5/9/2020 4456 Gross per 24 hour Intake 1563.31 ml Output 600 ml Net 963.31 ml General:          Frail   Alert. No distress. Pleasant. Eyes:   Normal sclera. Extraocular movements intact. ENT:  Normocephalic, atraumatic. Moist mucous membranes CV:   Regular rate and rhythm. No murmur, rub, or gallop. Lungs:  Clear to auscultation bilaterally. No wheezing, rhonchi, or rales. Abdomen: Soft, nontender, nondistended. Bowel sounds normal.  
Extremities: Warm and dry. No cyanosis or edema. Neurologic: CN II-XII grossly intact. Sensation intact. Skin:     No rashes or jaundice. Psych:  Normal mood and affect. Discharge Info:  
Current Discharge Medication List  
  
START taking these medications Details  
ibuprofen (MOTRIN) 400 mg tablet Take 1 Tab by mouth every six (6) hours as needed for Pain. Qty: 30 Tab, Refills: 0 CONTINUE these medications which have CHANGED Details  
enalapril (VASOTEC) 2.5 mg tablet Take 1 Tab by mouth two (2) times a day. Qty: 60 Tab, Refills: 0 CONTINUE these medications which have NOT CHANGED Details  
levothyroxine (SYNTHROID) 50 mcg tablet TAKE ONE (1) TABLET BY MOUTH DAILY. Qty: 90 Tab, Refills: 2  
  
fluticasone propionate (FLONASE) 50 mcg/actuation nasal spray USE 2 SPRAYS IN EACH NOSTRIL ONCE DAILY Qty: 3 Bottle, Refills: 3  
  
simvastatin (ZOCOR) 40 mg tablet TAKE <1> TABLET BY MOUTH NIGHTLY Qty: 90 Tab, Refills: 2  
  
vits A,C,E/lutein/minerals (VISION FORMULA, WITH LUTEIN, PO) Take 1 Tab by mouth daily. aspirin 81 mg chewable tablet Take 81 mg by mouth daily. traMADol (ULTRAM) 50 mg tablet Take 1 Tab by mouth every eight (8) hours as needed for Pain. Max Daily Amount: 150 mg. 
Qty: 30 Tab, Refills: 0 Associated Diagnoses: Ulcer of lower extremity, limited to breakdown of skin, unspecified laterality ([address] lidocaine (LIDODERM) 5 % 1 Patch by TransDERmal route every twenty-four (24) hours. Apply patch to the affected area for 12 hours a day and remove for 12 hours a day. Qty: 1 Each, Refills: 5  
  
simethicone 125 mg chewable tablet Take 125 mg by mouth every six (6) hours as needed for Flatulence. Qty: 60 Tab, Refills: 0  
  
esomeprazole (NEXIUM) 40 mg capsule Take 40 mg by mouth daily. brimonidine (ALPHAGAN) 0.15 % ophthalmic solution Administer 1 Drop to right eye three (3) times daily. acetaminophen (TYLENOL) 500 mg tablet Take 650 mg by mouth every six (6) hours as needed for Pain. clobetasol (TEMOVATE) 0.05 % external solution Apply 1 Drop to affected area two (2) times a day.  apply to left upper arm twice a day  
  
dorzolamide-timolol (COSOPT) 2-0.5 % ophthalmic solution Administer 1 Drop to both eyes two (2) times a day.  
  
ketoconazole (NIZORAL) 2 % shampoo Apply 1 mL to affected area daily. apply to hair and let sit for 3 min and rinse out TRAVATAN Z 0.004 % ophthalmic solution Administer 1 Drop to right eye nightly. calcium-cholecalciferol, d3, (CALCIUM 600 + D) 600-125 mg-unit Tab Take 1 Tab by mouth two (2) times a day. polyethylene glycol (MIRALAX) 17 gram packet Take 17 g by mouth daily. mixed with water or juice Cholecalciferol, Vitamin D3, (VITAMIN D3) 1,000 unit cap Take 1 Tab by mouth daily. STOP taking these medications  
  
 lubiPROStone (AMITIZA) 8 mcg capsule Comments:  
Reason for Stopping:   
   
 brimonidine (ALPHAGAN P) 0.1 % ophthalmic solution Comments:  
Reason for Stopping:   
   
  
 
 
 
Disposition: rehab Activity: as tolerated Diet: DIET NUTRITIONAL SUPPLEMENTS All Meals; Ensure Enlive ( ) DIET MECHANICAL SOFT Bladder training then removal of her grossman. Follow-up Information Follow up With Specialties Details Why Contact Info [facility] [address] [id number] [phone] Yair LOERA MD Internal Medicine   [address] [place] [address] [phone] Time spent in patient discharge planning and coordination 35 minutes.  
 
Signed: 
Sita Cho MD

## 2020-05-11 NOTE — ED PROVIDER NOTES
Sammy Schaefer is a 80 y.o. female seen on 5/11/2020 at 11:08 AM in the [facility] EMERGENCY DEPT in room ER02/02. Chief Complaint Patient presents with  Shortness of Breath HPI: 66-year-old female brought into the emergency department by EMS from a nursing facility. She was recently seen in the emergency department after a fall found to have have a scapular fracture. She apparently deteriorated with opiate pain medication became agitated and had encephalopathy. She was discharged encephalopathic still with a Givens catheter in place as she had had some difficulty urinating. Sent to the emergency department by nursing staff out of concern for possible shortness of breath. The details of this is unclear as the patient is completely disoriented and unable to provide any history. They do not note any fevers, no abnormal vital signs. Historian: EMS REVIEW OF SYSTEMS Review of Systems Unable to perform ROS: Dementia PAST MEDICAL HISTORY Past Medical History:  
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  
 Skin cancer of scalp 2012  TIA (transient ischemic attack)  Unspecified hypothyroidism 10/31/2012 Past Surgical History:  
Procedure Laterality Date  HX APPENDECTOMY  HX COLONOSCOPY    
 HX MALIGNANT SKIN LESION EXCISION    
 scalp  RECTAL SENSATION TEST, BALLOON Social History Socioeconomic History  Marital status: SINGLE Spouse name: Not on file  Number of children: Not on file  Years of education: Not on file  Highest education level: Not on file Tobacco Use  Smoking status: Never Smoker  Smokeless tobacco: Never Used Substance and Sexual Activity  Alcohol use: No  
 Drug use: No  
Other Topics Concern Prior to Admission Medications Prescriptions Last Dose Informant Patient Reported? Taking? Cholecalciferol, Vitamin D3, (VITAMIN D3) 1,000 unit cap   Yes No  
Sig: Take 1 Tab by mouth daily. TRAVATAN Z 0.004 % ophthalmic solution   Yes No  
Sig: Administer 1 Drop to right eye nightly. acetaminophen (TYLENOL) 500 mg tablet   Yes No  
Sig: Take 650 mg by mouth every six (6) hours as needed for Pain. aspirin 81 mg chewable tablet   Yes No  
Sig: Take 81 mg by mouth daily. brimonidine (ALPHAGAN) 0.15 % ophthalmic solution   Yes No  
Sig: Administer 1 Drop to right eye three (3) times daily. calcium-cholecalciferol, d3, (CALCIUM 600 + D) 600-125 mg-unit Tab   Yes No  
Sig: Take 1 Tab by mouth two (2) times a day. clobetasol (TEMOVATE) 0.05 % external solution   Yes No  
Sig: Apply 1 Drop to affected area two (2) times a day. apply to left upper arm twice a day  
dorzolamide-timolol (COSOPT) 2-0.5 % ophthalmic solution   Yes No  
Sig: Administer 1 Drop to both eyes two (2) times a day. enalapril (VASOTEC) 2.5 mg tablet   No No  
Sig: Take 1 Tab by mouth two (2) times a day. esomeprazole (NEXIUM) 40 mg capsule   Yes No  
Sig: Take 40 mg by mouth daily.   
fluticasone propionate (FLONASE) 50 mcg/actuation nasal spray   No No  
Sig: USE 2 SPRAYS IN EACH NOSTRIL ONCE DAILY  
ibuprofen (MOTRIN) 400 mg tablet   No No  
Sig: Take 1 Tab by mouth every six (6) hours as needed for Pain.  
ketoconazole (NIZORAL) 2 % shampoo   Yes No  
 Sig: Apply 1 mL to affected area daily. apply to hair and let sit for 3 min and rinse out  
levothyroxine (SYNTHROID) 50 mcg tablet   No No  
Sig: TAKE ONE (1) TABLET BY MOUTH DAILY. lidocaine (LIDODERM) 5 %   No No  
Si Patch by TransDERmal route every twenty-four (24) hours. Apply patch to the affected area for 12 hours a day and remove for 12 hours a day. polyethylene glycol (MIRALAX) 17 gram packet   Yes No  
Sig: Take 17 g by mouth daily. mixed with water or juice  
simethicone 125 mg chewable tablet   No No  
Sig: Take 125 mg by mouth every six (6) hours as needed for Flatulence. simvastatin (ZOCOR) 40 mg tablet   No No  
Sig: TAKE <1> TABLET BY MOUTH NIGHTLY Patient taking differently: TAKE 1 TABLET BY MOUTH NIGHTLY  
traMADol (ULTRAM) 50 mg tablet   No No  
Sig: Take 1 Tab by mouth every eight (8) hours as needed for Pain. Max Daily Amount: 150 mg.  
vits A,C,E/lutein/minerals (VISION FORMULA, WITH LUTEIN, PO)   Yes No  
Sig: Take 1 Tab by mouth daily. Facility-Administered Medications: None No Known Allergies PHYSICAL EXAM    
 
Vitals:  
 20 1018 BP: 163/83 Pulse: 92 Resp: 20 Temp: 98.1 °F (36.7 °C) SpO2: 98% Vital signs were reviewed. Physical Exam 
Vitals signs reviewed. Constitutional:   
   General: She is not in acute distress. Appearance: She is well-developed. She is not diaphoretic. HENT:  
   Head: Normocephalic and atraumatic. Eyes:  
   Pupils: Pupils are equal, round, and reactive to light. Neck: Musculoskeletal: Normal range of motion and neck supple. Cardiovascular:  
   Rate and Rhythm: Normal rate and regular rhythm. Heart sounds: Murmur present. No friction rub. No gallop. Pulmonary:  
   Effort: Pulmonary effort is normal. No respiratory distress. Breath sounds: No stridor. Examination of the right-lower field reveals rales. Examination of the left-lower field reveals rales. Rales present. No wheezing. Abdominal:  
   General: Bowel sounds are normal. There is no distension. Palpations: Abdomen is soft. There is no mass. Tenderness: There is no abdominal tenderness. There is no guarding or rebound. Musculoskeletal: Normal range of motion. General: No tenderness or deformity. Skin: 
   General: Skin is warm and dry. Findings: No erythema or rash. Neurological:  
   Mental Status: She is alert and oriented to person, place, and time. Sensory: No sensory deficit. Comments: No focal neuro deficits Psychiatric:     
   Behavior: Behavior normal.  
 
  
 
MEDICAL DECISION MAKING  
 
MDM Number of Diagnoses or Management Options Amount and/or Complexity of Data Reviewed Review and summarize past medical records: yes (Recent hospitalization records reviewed. In addition the patient was tested for COVID-19 which was negative. Review of old cardiology records also notes a history of severe mitral regurg most recently seen and noted to be euvolemic.) Procedures ED Course:   
12:15 PM 
Analysis is positive for UTI. I have contacted the nursing facility 3 times without any answer. Will start on antibiotics and plan to discharge back to the facility. She does have a slightly elevated BNP in the setting of mitral valve regurg. I recommended close outpatient follow-up with [facility],  she does not have any hypoxia. No evidence of sepsis. Disposition:  Dc to nursing home Diagnosis: Urinary tract infection 
____________________________________________________________________ A portion of this note was generated using voice recognition dictation software. While the note has been reviewed for accuracy, please note certain words and phrases may not be transcribed as intended and some grammatical and/or typographical errors may be present.

## 2020-05-11 NOTE — DISCHARGE INSTRUCTIONS
Ms Alesah Escobar is to have a urinary tract infection. Take the Keflex as prescribed.   She also needs to follow-up with [facility] in the next 2 to 3 days for reevaluation to ensure that her fluid status is normal

## 2020-05-11 NOTE — ED NOTES
[facility]. Last time called staff answered and stated \"the nurse doesn't need report, I will tell her you are sending her back. \". [facility] called at this time.

## 2020-05-11 NOTE — ED TRIAGE NOTES
Pt arrives via EMS from [facility]. Called out for shob,. Pt reports pt has been uncoop[erative with them. Crackles per EMS and mucous in her throat. Pt was recently discharged from here after a fall and scapular fracture. Pt tachypneic on arrival. Mask placed on arrival by ER.  EMS reports 91% on 4L, pt 98% on 4L on arrival.